# Patient Record
Sex: FEMALE | Race: WHITE | NOT HISPANIC OR LATINO | Employment: FULL TIME | URBAN - METROPOLITAN AREA
[De-identification: names, ages, dates, MRNs, and addresses within clinical notes are randomized per-mention and may not be internally consistent; named-entity substitution may affect disease eponyms.]

---

## 2017-03-02 NOTE — PROGRESS NOTES
SUBJECTIVE:                                                    Hansa Ta is a 26 year old female who presents to clinic today for the following health issues:      Growth on nose      Duration: one year    Description (location/character/radiation): on side of nose    Intensity:  mild    Accompanying signs and symptoms: bleeds when she picks at it. 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 Discussed follow up with dermatology. She understood and agreed.    She declines preventative care today as she is currently without insurance while she is in between jobs.     Problem list and histories reviewed & adjusted, as indicated.  Additional history: as documented    Patient Active Problem List   Diagnosis     Endometriosis     Ovarian cyst     CARDIOVASCULAR SCREENING; LDL GOAL LESS THAN 160     Post partum depression     Past Surgical History   Procedure Laterality Date     Appendectomy       Other surgical history       left ovary removed, for cysts     Laparoscopy diagnostic (gyn)       4     C/section, low transverse         Social History   Substance Use Topics     Smoking status: Never Smoker     Smokeless tobacco: Never Used     Alcohol use Yes      Comment: rare     History reviewed. No pertinent family history.      No current outpatient prescriptions on file.     No Known Allergies  BP Readings from Last 3 Encounters:   03/06/17 99/62   10/23/15 97/65   09/21/15 100/66    Wt Readings from Last 3 Encounters:   03/06/17 178 lb (80.7 kg)   10/23/15 159 lb (72.1 kg)   09/21/15 147 lb 12.8 oz (67 kg)                    Reviewed and updated as needed this visit by clinical staff  Tobacco  Allergies  Meds  Med Hx  Surg Hx  Fam Hx  Soc Hx      Reviewed and updated as needed this visit by  "Provider         ROS:  Constitutional, and integumentary systems are negative, except as otherwise noted.    OBJECTIVE:                                                    BP 99/62  Pulse 85  Temp 98.2  F (36.8  C) (Oral)  Ht 5' 4.25\" (1.632 m)  Wt 178 lb (80.7 kg)  SpO2 97%  BMI 30.32 kg/m2  Body mass index is 30.32 kg/(m^2).  GENERAL: healthy, alert and no distress  SKIN: <1cm smooth papule on the right nare, no swelling, no tenderness to palpation, no erythema, no signs of infection          ASSESSMENT/PLAN:                                                        ICD-10-CM    1. Skin lesion L98.9 DERMATOLOGY REFERRAL       Patient Instructions   Inquire about payment options at the dermatologist.    Inquire about the cost of a simple biopsy from the nose less than 1 cm in diameter.    Return to the clinic once you have insurance for an annual physical.      Melissa Cali PA-C  [facility]  "

## 2017-03-06 ENCOUNTER — OFFICE VISIT (OUTPATIENT)
Dept: FAMILY MEDICINE | Facility: CLINIC | Age: 27
End: 2017-03-06

## 2017-03-06 VITALS
SYSTOLIC BLOOD PRESSURE: 99 MMHG | WEIGHT: 178 LBS | TEMPERATURE: 98.2 F | HEART RATE: 85 BPM | OXYGEN SATURATION: 97 % | DIASTOLIC BLOOD PRESSURE: 62 MMHG | BODY MASS INDEX: 30.39 KG/M2 | HEIGHT: 64 IN

## 2017-03-06 DIAGNOSIS — L98.9 SKIN LESION: Primary | ICD-10-CM

## 2017-03-06 PROCEDURE — 99213 OFFICE O/P EST LOW 20 MIN: CPT | Performed by: PHYSICIAN ASSISTANT

## 2017-03-06 NOTE — MR AVS SNAPSHOT
After Visit Summary   3/6/2017    Hansa Ta    MRN: [id number]           Patient Information     Date Of Birth          1990        Visit Information        Provider Department      3/6/2017 10:00 AM Melissa Cali PA-C [facility]        Today's Diagnoses     Skin lesion    -  1      Care Instructions    Inquire about payment options at the dermatologist.    Inquire about the cost of a simple biopsy from the nose less than 1 cm in diameter.    Return to the clinic once you have insurance for an annual physical.        Follow-ups after your visit        Additional Services     DERMATOLOGY REFERRAL       Your provider has referred you to: FMG: [facility] [place] [phone]   [email or web address]/[place]/  UMP: [facility] [phone]   http://www.[facility]/[email or web address]-btyezdx-[place]/  [facility] [phone]   http://www.Support Your App/  [place] (2 locations) [phone]   http://www.[employer]/  [place] [phone]   [email or web address]://[email or web address].[employer] App/    Please be aware that coverage of these services is subject to the terms and limitations of your health insurance plan.  Call member services at your health plan with any benefit or coverage questions.      Please bring the following with you to your appointment:    (1) Any X-Rays, CTs or MRIs which have been performed.  Contact the facility where they were done to arrange for  prior to your scheduled appointment.    (2) List of current medications  (3) This referral request   (4) Any documents/labs given to you for this referral                  Who to contact     If you have questions or need follow up information about today's clinic visit or your schedule please contact [facility] directly at [phone].  Normal or non-critical lab and  "imaging results will be communicated to you by MyChart, letter or phone within 4 business days after the clinic has received the results. If you do not hear from us within 7 days, please contact the clinic through [employer] or phone. If you have a critical or abnormal lab result, we will notify you by phone as soon as possible.  Submit refill requests through [employer] or call your pharmacy and they will forward the refill request to us. Please allow 3 business days for your refill to be completed.          Additional Information About Your Visit        Flowboard[employer]Skyword Information     VE[employer]IMS lets you send messages to your doctor, view your test results, renew your prescriptions, schedule appointments and more. To sign up, go to [email or web address] . Click on \"Log in\" on the left side of the screen, which will take you to the Welcome page. Then click on \"Sign up Now\" on the right side of the page.     You will be asked to enter the access code listed below, as well as some personal information. Please follow the directions to create your username and password.     Your access code is: [email or web address]  Expires: 2017 10:29 AM     Your access code will  in 90 days. If you need help or a new code, please call your [place] clinic or [phone].        Care EveryWhere ID     This is your Care EveryWhere ID. This could be used by other organizations to access your [place] medical records  [id number]        Your Vitals Were     Pulse Temperature Height Pulse Oximetry BMI (Body Mass Index)       85 98.2  F (36.8  C) (Oral) 5' 4.25\" (1.632 m) 97% 30.32 kg/m2        Blood Pressure from Last 3 Encounters:   17 99/62   10/23/15 97/65   09/21/15 100/66    Weight from Last 3 Encounters:   17 178 lb (80.7 kg)   10/23/15 159 lb (72.1 kg)   09/21/15 147 lb 12.8 oz (67 kg)              We Performed the Following     DERMATOLOGY REFERRAL          Today's Medication Changes          These changes are accurate as of: " 3/6/17 10:29 AM.  If you have any questions, ask your nurse or doctor.               Stop taking these medicines if you haven't already. Please contact your care team if you have questions.     albuterol 108 (90 BASE) MCG/ACT Inhaler   Commonly known as:  PROAIR HFA/PROVENTIL HFA/VENTOLIN HFA   Stopped by:  Melissa Cali PA-C           ibuprofen 600 MG tablet   Commonly known as:  ADVIL/MOTRIN   Stopped by:  Melissa Cali PA-C           sertraline 50 MG tablet   Commonly known as:  ZOLOFT   Stopped by:  Melissa Cali PA-C                    Primary Care Provider Office Phone # Fax #    Ignacia LAYA Ramos [facility] [phone]       [facility] [address] [facility] [address]        Thank you!     Thank you for choosing [facility]  for your care. Our goal is always to provide you with excellent care. Hearing back from our patients is one way we can continue to improve our services. Please take a few minutes to complete the written survey that you may receive in the mail after your visit with us. Thank you!             Your Updated Medication List - Protect others around you: Learn how to safely use, store and throw away your medicines at www.disposemymeds.org.      Notice  As of 3/6/2017 10:29 AM    You have not been prescribed any medications.

## 2017-03-06 NOTE — PATIENT INSTRUCTIONS
Inquire about payment options at the dermatologist.    Inquire about the cost of a simple biopsy from the nose less than 1 cm in diameter.    Return to the clinic once you have insurance for an annual physical.

## 2017-03-06 NOTE — NURSING NOTE
"Chief Complaint   Patient presents with     Derm Problem     on side of nose - x 1 year -        Initial BP 99/62  Pulse 85  Temp 98.2  F (36.8  C) (Oral)  Ht 5' 4.25\" (1.632 m)  Wt 178 lb (80.7 kg)  SpO2 97%  BMI 30.32 kg/m2 Estimated body mass index is 30.32 kg/(m^2) as calculated from the following:    Height as of this encounter: 5' 4.25\" (1.632 m).    Weight as of this encounter: 178 lb (80.7 kg).  Medication Reconciliation: complete  Renee Youssef M.A.    "

## 2017-10-23 ENCOUNTER — OFFICE VISIT (OUTPATIENT)
Dept: INTERNAL MEDICINE | Facility: CLINIC | Age: 27
End: 2017-10-23
Payer: COMMERCIAL

## 2017-10-23 VITALS
SYSTOLIC BLOOD PRESSURE: 96 MMHG | DIASTOLIC BLOOD PRESSURE: 66 MMHG | OXYGEN SATURATION: 96 % | HEART RATE: 62 BPM | BODY MASS INDEX: 29.91 KG/M2 | WEIGHT: 175.6 LBS | TEMPERATURE: 97.8 F

## 2017-10-23 DIAGNOSIS — Z32.01 PREGNANCY TEST POSITIVE: ICD-10-CM

## 2017-10-23 DIAGNOSIS — N91.2 ABSENCE OF MENSTRUATION: Primary | ICD-10-CM

## 2017-10-23 LAB — BETA HCG QUAL IFA URINE: POSITIVE

## 2017-10-23 PROCEDURE — 99213 OFFICE O/P EST LOW 20 MIN: CPT | Performed by: INTERNAL MEDICINE

## 2017-10-23 PROCEDURE — 84703 CHORIONIC GONADOTROPIN ASSAY: CPT | Performed by: INTERNAL MEDICINE

## 2017-10-23 RX ORDER — ONDANSETRON 4 MG/1
4 TABLET, FILM COATED ORAL EVERY 6 HOURS PRN
Qty: 40 TABLET | Refills: 2 | Status: SHIPPED | OUTPATIENT
Start: 2017-10-23 | End: 2018-04-19

## 2017-10-23 NOTE — MR AVS SNAPSHOT
After Visit Summary   10/23/2017    Hansa Ta    MRN: [id number]           Patient Information     Date Of Birth          1990        Visit Information        Provider Department      10/23/2017 11:30 AM Santa Sorenson MD [facility]        Today's Diagnoses     Absence of menstruation    -  1    Pregnancy test positive          Care Instructions    Please call [phone] to schedule an appointment with Gynecologist/Obstetrician soon, ie, call within a week.  See basic recommendations below:        Pregnancy    Your exam today shows that you are pregnant.  Pregnancy symptoms  During pregnancy your body s hormones change. This causes physical and emotional changes. This is normal. Knowing what to expect is important for your piece of mind and so you know when to seek help for a problem. Here are some of the most common symptoms:    Morning sickness or nausea. This can happen any time of the day or night.    Tender, swollen breasts    Need to urinate frequently    Tiredness or fatigue    Dizziness    Indigestion or heartburn    Food cravings or turn-offs    Constipation    Emotional changes. This can range from anxiety to excitement to depression.  General care for a healthy pregnancy  Here are things you can do to help make sure your baby is born healthy:    Rest when you feel tired. This is especially true in the later months of pregnancy.    Drink more fluids. Your body needs more fluids than you may be used to. Drink 8 to10 glasses of juice, milk, or water every day.    Eat well-balanced meals. Eat at regular times to give your body enough protein. You can expect to gain about 30 pounds during the pregnancy. Don t try to diet or lose weight while you are pregnant.    Take a prenatal vitamin every day. This helps you meet the extra nutritional needs of pregnancy.    Don t take any other medicine during your pregnancy unless your healthcare provider tells you to.  This includes prescription medicines and those you buy over the counter. Many medicines can harm the growing baby.    If you have nausea or vomiting, don t eat greasy or fried foods. Eat several smaller meals throughout the day rather than 3 large meals.    If you smoke, you must stop. The nicotine you breathe in goes right to the baby.    Stay away from alcohol, even in moderate amounts. Daily drinking will harm your baby and can cause permanent brain damage.    Don t use recreational drugs, especially cocaine, crack, and heroin. These will harm your baby. Also avoid marijuana.    If you were using recreational drugs or prescribed medicine when you found out that you were pregnant, talk with your healthcare provider about possible effects on your growing baby.    If you have medical problems that you need to take medicine for, talk with your healthcare provider.  Follow-up care  Call your healthcare provider to arrange for prenatal care. Prenatal care is important. You can see your family provider, a pregnancy specialist (obstetrician), or a primary care clinic.  When to seek medical advice  Call your healthcare provider right away if any of these occur:    Vaginal bleeding    Pain in your belly (abdomen) or back that is moderate or severe    Lots of vomiting, or you can t keep any fluids down for 6 hours    Burning feeling when you urinate    Headache, dizziness, or rapid weight gain    Fever    Vision changes or blurred vision  Date Last Reviewed: 10/1/2016    [id number] [employer]. [address] [place] [address], [place] [address]. All rights reserved. This information is not intended as a substitute for professional medical care. Always follow your healthcare professional's instructions.                Follow-ups after your visit        Additional Services     OB/GYN REFERRAL       Your provider has referred you to:  FMG: [facility] [phone]    "[email or web address]/[place]/  FMG: [place] [place] [phone]   [email or web address]/[place]/  FMG: [place] [facility]gu[facility] [phone]   http://[email or web address]/[place]/  FMG: [facility] [place] [phone]   http://www.[email or web address].[facility]/     Please be aware that coverage of these services is subject to the terms and limitations of your health insurance plan.  Call member services at your health plan with any benefit or coverage questions.      Please bring the following with you to your appointment:    (1) Any X-Rays, CTs or MRIs which have been performed.  Contact the facility where they were done to arrange for  prior to your scheduled appointment.   (2) List of current medications   (3) This referral request   (4) Any documents/labs given to you for this referral                  Who to contact     If you have questions or need follow up information about today's clinic visit or your schedule please contact [facility] directly at [phone].  Normal or non-critical lab and imaging results will be communicated to you by MyChart, letter or phone within 4 business days after the clinic has received the results. If you do not hear from us within 7 days, please contact the clinic through MyChart or phone. If you have a critical or abnormal lab result, we will notify you by phone as soon as possible.  Submit refill requests through [employer] or call your pharmacy and they will forward the refill request to us. Please allow 3 business days for your refill to be completed.          Additional Information About Your Visit        Go[employer] Software Information     [employer] lets you send messages to your doctor, view your test results, renew your prescriptions, schedule appointments and more. To sign up, go to [email or web address] . Click on \"Log in\" on the left side of the " "screen, which will take you to the Welcome page. Then click on \"Sign up Now\" on the right side of the page.     You will be asked to enter the access code listed below, as well as some personal information. Please follow the directions to create your username and password.     Your access code is: [email or web address]  Expires: 2018 12:00 PM     Your access code will  in 90 days. If you need help or a new code, please call your [facility] or [phone].        Care EveryWhere ID     This is your Care EveryWhere ID. This could be used by other organizations to access your [place] medical records  [id number]        Your Vitals Were     Pulse Temperature Last Period Pulse Oximetry BMI (Body Mass Index)       62 97.8  F (36.6  C) (Oral) 2017 (Approximate) 96% 29.91 kg/m2        Blood Pressure from Last 3 Encounters:   10/23/17 96/66   17 99/62   10/23/15 97/65    Weight from Last 3 Encounters:   10/23/17 175 lb 9.6 oz (79.7 kg)   17 178 lb (80.7 kg)   10/23/15 159 lb (72.1 kg)              We Performed the Following     Beta HCG qual IFA urine - [facility] and [place]     OB/GYN REFERRAL          Today's Medication Changes          These changes are accurate as of: 10/23/17 12:00 PM.  If you have any questions, ask your nurse or doctor.               Start taking these medicines.        Dose/Directions    ondansetron 4 MG tablet   Commonly known as:  ZOFRAN   Used for:  Pregnancy test positive   Started by:  Santa Sorenson MD        Dose:  4 mg   Take 1 tablet (4 mg) by mouth every 6 hours as needed for nausea   Quantity:  40 tablet   Refills:  2            Where to get your medicines      These medications were sent to [facility] [address] [facility], [address]  [address] [facility] [address] [address]     Phone:  [phone]     ondansetron 4 MG tablet                Primary Care Provider Office Phone # Fax #    [facility] " [phone]       [address] [facility] [address]        Equal Access to Services     CATARINO ECHEVARRIA : Hadii aad ku haddonnaemiliano Sudarshan, waterrida lukey, quincyta kaloveda tala, rosa truong katherynki benavidez laLaylaмария jett. So [facility] [phone].    ATENCIÓN: Si habla español, tiene a espinosa disposición servicios gratuitos de asistencia lingüística. Llame al [phone].    We comply with applicable federal civil rights laws and [place] laws. We do not discriminate on the basis of race, color, national origin, age, disability, sex, sexual orientation, or gender identity.            Thank you!     Thank you for choosing [facility]  for your care. Our goal is always to provide you with excellent care. Hearing back from our patients is one way we can continue to improve our services. Please take a few minutes to complete the written survey that you may receive in the mail after your visit with us. Thank you!             Your Updated Medication List - Protect others around you: Learn how to safely use, store and throw away your medicines at www.disposemymeds.org.          This list is accurate as of: 10/23/17 12:00 PM.  Always use your most recent med list.                   Brand Name Dispense Instructions for use Diagnosis    ondansetron 4 MG tablet    ZOFRAN    40 tablet    Take 1 tablet (4 mg) by mouth every 6 hours as needed for nausea    Pregnancy test positive

## 2017-10-23 NOTE — PROGRESS NOTES
SUBJECTIVE:   Hansa Ta is a 27 year old female who presents to clinic today for the following health issues:      Patient is here to confirm pregnancy. LMP is approximately 9/8/2017        Reviewed and updated as needed this visit by clinical staff  Tobacco  Allergies  Meds  Med Hx  Surg Hx  Fam Hx  Soc Hx      Reviewed and updated as needed this visit by Provider         Patient Active Problem List   Diagnosis     Endometriosis     Ovarian cyst     CARDIOVASCULAR SCREENING; LDL GOAL LESS THAN 160     Post partum depression       Past Medical History:   Diagnosis Date     Endometriosis      Ovarian cyst        Past Surgical History:   Procedure Laterality Date     APPENDECTOMY       C/SECTION, LOW TRANSVERSE       LAPAROSCOPY DIAGNOSTIC (GYN)      4     OTHER SURGICAL HISTORY      left ovary removed, for cysts       History reviewed. No pertinent family history.    Social History   Substance Use Topics     Smoking status: Never Smoker     Smokeless tobacco: Never Used     Alcohol use Yes      Comment: rare       Current Outpatient Prescriptions   Medication     ondansetron (ZOFRAN) 4 MG tablet     No current facility-administered medications for this visit.          ROS:  Constitutional, HEENT, cardiovascular, pulmonary, GI, , musculoskeletal, neuro, skin, endocrine and psych systems are negative, except as otherwise noted.     OBJECTIVE:                                                    BP 96/66  Pulse 62  Temp 97.8  F (36.6  C) (Oral)  Wt 175 lb 9.6 oz (79.7 kg)  LMP 09/08/2017 (Approximate)  SpO2 96%  BMI 29.91 kg/m2     GENERAL APPEARANCE: healthy, alert and in no distress  PSYCH: mentation appears normal and affect normal/bright.    Results for orders placed or performed in visit on 10/23/17   Beta HCG qual IFA urine - [facility] and [place]   Result Value Ref Range    Beta HCG Qual IFA Urine Positive (A) NEG^Negative          No results found for this or any previous visit (from the past 744  hour(s)).      ASSESSMENT/PLAN:                                                        ICD-10-CM    1. Absence of menstruation N91.2 Beta HCG qual IFA urine - [facility] and [place]   2. Pregnancy test positive Z32.01 OB/GYN REFERRAL     ondansetron (ZOFRAN) 4 MG tablet       Patient Instructions   Please call [phone] to schedule an appointment with Gynecologist/Obstetrician soon, ie, call within a week.  See basic recommendations below:        Pregnancy    Your exam today shows that you are pregnant.  Pregnancy symptoms  During pregnancy your body s hormones change. This causes physical and emotional changes. This is normal. Knowing what to expect is important for your piece of mind and so you know when to seek help for a problem. Here are some of the most common symptoms:    Morning sickness or nausea. This can happen any time of the day or night.    Tender, swollen breasts    Need to urinate frequently    Tiredness or fatigue    Dizziness    Indigestion or heartburn    Food cravings or turn-offs    Constipation    Emotional changes. This can range from anxiety to excitement to depression.  General care for a healthy pregnancy  Here are things you can do to help make sure your baby is born healthy:    Rest when you feel tired. This is especially true in the later months of pregnancy.    Drink more fluids. Your body needs more fluids than you may be used to. Drink 8 to10 glasses of juice, milk, or water every day.    Eat well-balanced meals. Eat at regular times to give your body enough protein. You can expect to gain about 30 pounds during the pregnancy. Don t try to diet or lose weight while you are pregnant.    Take a prenatal vitamin every day. This helps you meet the extra nutritional needs of pregnancy.    Don t take any other medicine during your pregnancy unless your healthcare provider tells you to. This includes prescription medicines and those you buy over the counter. Many medicines can harm the  growing baby.    If you have nausea or vomiting, don t eat greasy or fried foods. Eat several smaller meals throughout the day rather than 3 large meals.    If you smoke, you must stop. The nicotine you breathe in goes right to the baby.    Stay away from alcohol, even in moderate amounts. Daily drinking will harm your baby and can cause permanent brain damage.    Don t use recreational drugs, especially cocaine, crack, and heroin. These will harm your baby. Also avoid marijuana.    If you were using recreational drugs or prescribed medicine when you found out that you were pregnant, talk with your healthcare provider about possible effects on your growing baby.    If you have medical problems that you need to take medicine for, talk with your healthcare provider.  Follow-up care  Call your healthcare provider to arrange for prenatal care. Prenatal care is important. You can see your family provider, a pregnancy specialist (obstetrician), or a primary care clinic.  When to seek medical advice  Call your healthcare provider right away if any of these occur:    Vaginal bleeding    Pain in your belly (abdomen) or back that is moderate or severe    Lots of vomiting, or you can t keep any fluids down for 6 hours    Burning feeling when you urinate    Headache, dizziness, or rapid weight gain    Fever    Vision changes or blurred vision  Date Last Reviewed: 10/1/2016    5629-4496 [employer]. [address] [place] [address], [place] [address]. All rights reserved. This information is not intended as a substitute for professional medical care. Always follow your healthcare professional's instructions.            Santa Sorenson MD    [address] [phone]

## 2017-10-23 NOTE — NURSING NOTE
"Chief Complaint   Patient presents with     Confirmation Of Pregnancy       Initial BP 96/66  Pulse 62  Temp 97.8  F (36.6  C) (Oral)  Wt 175 lb 9.6 oz (79.7 kg)  LMP 09/08/2017 (Approximate)  SpO2 96%  BMI 29.91 kg/m2 Estimated body mass index is 29.91 kg/(m^2) as calculated from the following:    Height as of 3/6/17: 5' 4.25\" (1.632 m).    Weight as of this encounter: 175 lb 9.6 oz (79.7 kg).  Medication Reconciliation: complete    Mara Garcia CMA  "

## 2017-10-23 NOTE — PATIENT INSTRUCTIONS
Please call [phone] to schedule an appointment with Gynecologist/Obstetrician soon, ie, call within a week.  See basic recommendations below:        Pregnancy    Your exam today shows that you are pregnant.  Pregnancy symptoms  During pregnancy your body s hormones change. This causes physical and emotional changes. This is normal. Knowing what to expect is important for your piece of mind and so you know when to seek help for a problem. Here are some of the most common symptoms:    Morning sickness or nausea. This can happen any time of the day or night.    Tender, swollen breasts    Need to urinate frequently    Tiredness or fatigue    Dizziness    Indigestion or heartburn    Food cravings or turn-offs    Constipation    Emotional changes. This can range from anxiety to excitement to depression.  General care for a healthy pregnancy  Here are things you can do to help make sure your baby is born healthy:    Rest when you feel tired. This is especially true in the later months of pregnancy.    Drink more fluids. Your body needs more fluids than you may be used to. Drink 8 to10 glasses of juice, milk, or water every day.    Eat well-balanced meals. Eat at regular times to give your body enough protein. You can expect to gain about 30 pounds during the pregnancy. Don t try to diet or lose weight while you are pregnant.    Take a prenatal vitamin every day. This helps you meet the extra nutritional needs of pregnancy.    Don t take any other medicine during your pregnancy unless your healthcare provider tells you to. This includes prescription medicines and those you buy over the counter. Many medicines can harm the growing baby.    If you have nausea or vomiting, don t eat greasy or fried foods. Eat several smaller meals throughout the day rather than 3 large meals.    If you smoke, you must stop. The nicotine you breathe in goes right to the baby.    Stay away from alcohol, even in moderate amounts. Daily  drinking will harm your baby and can cause permanent brain damage.    Don t use recreational drugs, especially cocaine, crack, and heroin. These will harm your baby. Also avoid marijuana.    If you were using recreational drugs or prescribed medicine when you found out that you were pregnant, talk with your healthcare provider about possible effects on your growing baby.    If you have medical problems that you need to take medicine for, talk with your healthcare provider.  Follow-up care  Call your healthcare provider to arrange for prenatal care. Prenatal care is important. You can see your family provider, a pregnancy specialist (obstetrician), or a primary care clinic.  When to seek medical advice  Call your healthcare provider right away if any of these occur:    Vaginal bleeding    Pain in your belly (abdomen) or back that is moderate or severe    Lots of vomiting, or you can t keep any fluids down for 6 hours    Burning feeling when you urinate    Headache, dizziness, or rapid weight gain    Fever    Vision changes or blurred vision  Date Last Reviewed: 10/1/2016    9305-8152 [employer]. [address] [place] [address], [place] [address]. All rights reserved. This information is not intended as a substitute for professional medical care. Always follow your healthcare professional's instructions.

## 2017-10-27 ENCOUNTER — PRENATAL OFFICE VISIT (OUTPATIENT)
Dept: OBGYN | Facility: CLINIC | Age: 27
End: 2017-10-27
Payer: COMMERCIAL

## 2017-10-27 DIAGNOSIS — O34.219 PREVIOUS CESAREAN DELIVERY AFFECTING PREGNANCY: Primary | ICD-10-CM

## 2017-10-27 DIAGNOSIS — N89.8 VAGINAL IRRITATION: ICD-10-CM

## 2017-10-27 DIAGNOSIS — N76.0 BV (BACTERIAL VAGINOSIS): ICD-10-CM

## 2017-10-27 DIAGNOSIS — B96.89 BV (BACTERIAL VAGINOSIS): ICD-10-CM

## 2017-10-27 LAB
BASOPHILS # BLD AUTO: 0 10E9/L (ref 0–0.2)
BASOPHILS NFR BLD AUTO: 0.3 %
DIFFERENTIAL METHOD BLD: NORMAL
EOSINOPHIL # BLD AUTO: 0.1 10E9/L (ref 0–0.7)
EOSINOPHIL NFR BLD AUTO: 0.8 %
ERYTHROCYTE [DISTWIDTH] IN BLOOD BY AUTOMATED COUNT: 12.7 % (ref 10–15)
HCT VFR BLD AUTO: 40 % (ref 35–47)
HGB BLD-MCNC: 13.7 G/DL (ref 11.7–15.7)
LYMPHOCYTES # BLD AUTO: 1.5 10E9/L (ref 0.8–5.3)
LYMPHOCYTES NFR BLD AUTO: 22.6 %
MCH RBC QN AUTO: 30.4 PG (ref 26.5–33)
MCHC RBC AUTO-ENTMCNC: 34.3 G/DL (ref 31.5–36.5)
MCV RBC AUTO: 89 FL (ref 78–100)
MONOCYTES # BLD AUTO: 0.5 10E9/L (ref 0–1.3)
MONOCYTES NFR BLD AUTO: 7.1 %
NEUTROPHILS # BLD AUTO: 4.6 10E9/L (ref 1.6–8.3)
NEUTROPHILS NFR BLD AUTO: 69.2 %
PLATELET # BLD AUTO: 184 10E9/L (ref 150–450)
RBC # BLD AUTO: 4.5 10E12/L (ref 3.8–5.2)
SPECIMEN SOURCE: ABNORMAL
WBC # BLD AUTO: 6.6 10E9/L (ref 4–11)
WET PREP SPEC: ABNORMAL

## 2017-10-27 PROCEDURE — 86900 BLOOD TYPING SEROLOGIC ABO: CPT | Performed by: OBSTETRICS & GYNECOLOGY

## 2017-10-27 PROCEDURE — 99207 ZZC FIRST OB VISIT: CPT | Performed by: OBSTETRICS & GYNECOLOGY

## 2017-10-27 PROCEDURE — 86901 BLOOD TYPING SEROLOGIC RH(D): CPT | Performed by: OBSTETRICS & GYNECOLOGY

## 2017-10-27 PROCEDURE — 36415 COLL VENOUS BLD VENIPUNCTURE: CPT | Performed by: OBSTETRICS & GYNECOLOGY

## 2017-10-27 PROCEDURE — 85025 COMPLETE CBC W/AUTO DIFF WBC: CPT | Performed by: OBSTETRICS & GYNECOLOGY

## 2017-10-27 PROCEDURE — 87210 SMEAR WET MOUNT SALINE/INK: CPT | Performed by: OBSTETRICS & GYNECOLOGY

## 2017-10-27 PROCEDURE — 87340 HEPATITIS B SURFACE AG IA: CPT | Performed by: OBSTETRICS & GYNECOLOGY

## 2017-10-27 PROCEDURE — 86850 RBC ANTIBODY SCREEN: CPT | Performed by: OBSTETRICS & GYNECOLOGY

## 2017-10-27 PROCEDURE — 87389 HIV-1 AG W/HIV-1&-2 AB AG IA: CPT | Performed by: OBSTETRICS & GYNECOLOGY

## 2017-10-27 PROCEDURE — 86762 RUBELLA ANTIBODY: CPT | Performed by: OBSTETRICS & GYNECOLOGY

## 2017-10-27 PROCEDURE — 86780 TREPONEMA PALLIDUM: CPT | Performed by: OBSTETRICS & GYNECOLOGY

## 2017-10-27 PROCEDURE — 87086 URINE CULTURE/COLONY COUNT: CPT | Performed by: OBSTETRICS & GYNECOLOGY

## 2017-10-27 RX ORDER — PRENATAL VIT/IRON FUM/FOLIC AC 27MG-0.8MG
1 TABLET ORAL DAILY
COMMUNITY
End: 2019-08-06

## 2017-10-27 RX ORDER — METRONIDAZOLE 7.5 MG/G
1 GEL VAGINAL AT BEDTIME
Qty: 70 G | Refills: 0 | Status: SHIPPED | OUTPATIENT
Start: 2017-10-27 | End: 2017-11-01

## 2017-10-27 NOTE — PROGRESS NOTES
INITIAL OB ASSESSMENT............................................Date: 10/27/2017                            ---------------------    Name: Hansa Ta.......................................................................Plan Number: [id number]    Age: 27 year old   : 1990  Phone: [phone] (home)   Address: [address] [place] [id number]    Marital Status:    Race/Ethnicity:   Occupation:   at [employer] in [place]  Partner's Name:  Tylor Ta, Partner's Occupation:   for [employer]      LMP:  Patient's LMP from OB Dating Form:  Patient's last menstrual period was 2017 (approximate).  Regular menses? yes  Menses every month.   Length of menses: 2-3 days    Obstetrical History  ===================  Obstetric History       T1      L1     SAB0   TAB0   Ectopic0   Multiple0   Live Births1       # Outcome Date GA Lbr Humberto/2nd Weight Sex Delivery Anes PTL Lv   1 Term 08/14/15 41w0d  9 lb 8 oz (4.309 kg) F CS-LTranv Spinal N TAYA      Complications: Fetal Intolerance      Apgar1:  2                Apgar5: 9          Past Medical History:  Past Medical History:   Diagnosis Date     Endometriosis      Ovarian cyst        Past Surgical History:  Past Surgical History:   Procedure Laterality Date     APPENDECTOMY       C/SECTION, LOW TRANSVERSE       LAPAROSCOPY DIAGNOSTIC (GYN)      4     OTHER SURGICAL HISTORY      left ovary removed, for cysts       Current Outpatient Prescriptions   Medication Sig Dispense Refill     Prenatal Vit-Fe Fumarate-FA (PRENATAL MULTIVITAMIN PLUS IRON) 27-0.8 MG TABS per tablet Take 1 tablet by mouth daily       ondansetron (ZOFRAN) 4 MG tablet Take 1 tablet (4 mg) by mouth every 6 hours as needed for nausea 40 tablet 2       No Known Allergies    Social History     Social History     Marital status:      Spouse name: Tylor     Number of children: 1     Years of education: 16      Occupational History     Not on file.     Social History Main Topics     Smoking status: Never Smoker     Smokeless tobacco: Never Used     Alcohol use Yes      Comment: rare     Drug use: No     Sexual activity: Yes     Partners: Male     Other Topics Concern     Not on file     Social History Narrative     , Children  No substance abuse, environmental exposures, mental health risks and No financial concerns.  They have pets, 2 dogs. Partner support.       Family History   Problem Relation Age of Onset     Spine Problems Mother      Lung Cancer Father        Past Medical History of Father of Baby:   No significant medical history       No known genetic disease in patient's 1st or 2nd degree relatives  No known genetic diseases in the FOB's 1st or 2nd degree relatives      REVIEW OF SYMPTOMS:   History Since Last Menstrual Period:    nausea, vomiting, fatigue and breast tenderness       PHYSICAL EXAM:  General:  WNWD female, NAD  Oriented:  X 3  Alert  PSYCH:  mentation appears normal., affect and mood normal  HEENT:  NC/AT, EOMI  NECK:  Neck supple. No adenopathy. Thyroid symmetric, normal size,, Carotids without bruits.  HEART:  RRR  LUNGS:  Clear to auscultation.  Good piratory effort  BREASTS: symmetrical, no masses palpated, no discharge expressed  ABDOMEN: Benign, Soft, flat, non-tender, No masses, organomegaly and Bowel sounds normoactive.  VULVA: no masses or lesions seen  BUS:  Bartholin's, Urethra, Tornado's normal  VAGINA:  No masses or lesions seen.   CERVIX:  Nulliparous, closed, mobile,  no discharge  UTERUS:  Normal shape, position and consistency and Nontender; 6-8 week size  ADNEXA:  No masses palpated, non-tender  EXTREMITIES:No cyanosis, clubbing, warm and well perfused and No edema   GAIT: normal including tandem walk, heel and toe walk.        Assessment:   IUP at 7 weeks, but reported uncertain of LMP     Plan:  Options for  testing for chromosomal and birth defects were  discussed with the patient.  Diagnostic tests include CVS and Amniocentesis.  We discussed that these tests are definitive but invasive and do carry a risk of fetal loss.    Screening tests include nuchal translucency/blood marker testing in the first trimester and quad screening in the second trimester.  We discussed that these are screening tests and not diagnostic tests and that false positives and negatives are a distinct possibility.  The patient will discuss with her  and decide.  We discussed physician coverage, tertiary support, diet, exercise, weight gain, schedule of visits, routine and indicated ultrasounds, and childbirth education.   Labs done today  RTC 4 weeks    Romaine Cardenas MD

## 2017-10-27 NOTE — NURSING NOTE
"Chief Complaint   Patient presents with     Prenatal Care     First OB       Initial LMP 09/08/2017 (Approximate) Estimated body mass index is 29.91 kg/(m^2) as calculated from the following:    Height as of 3/6/17: 5' 4.25\" (1.632 m).    Weight as of 10/23/17: 175 lb 9.6 oz (79.7 kg).  Medication Reconciliation: complete   LOURDES Alston 10/27/2017         "

## 2017-10-28 LAB — T PALLIDUM IGG+IGM SER QL: NEGATIVE

## 2017-10-29 LAB
BACTERIA SPEC CULT: NORMAL
RUBV IGG SERPL IA-ACNC: 16 IU/ML
SPECIMEN SOURCE: NORMAL

## 2017-10-30 LAB
ABO + RH BLD: NORMAL
ABO + RH BLD: NORMAL
BLD GP AB SCN SERPL QL: NORMAL
BLOOD BANK CMNT PATIENT-IMP: NORMAL
HBV SURFACE AG SERPL QL IA: NONREACTIVE
HIV 1+2 AB+HIV1 P24 AG SERPL QL IA: NONREACTIVE
SPECIMEN EXP DATE BLD: NORMAL

## 2017-11-06 ENCOUNTER — RADIANT APPOINTMENT (OUTPATIENT)
Dept: ULTRASOUND IMAGING | Facility: CLINIC | Age: 27
End: 2017-11-06
Attending: OBSTETRICS & GYNECOLOGY
Payer: COMMERCIAL

## 2017-11-06 DIAGNOSIS — O34.219 PREVIOUS CESAREAN DELIVERY AFFECTING PREGNANCY: ICD-10-CM

## 2017-11-06 PROCEDURE — 76801 OB US < 14 WKS SINGLE FETUS: CPT

## 2017-11-07 ENCOUNTER — TELEPHONE (OUTPATIENT)
Dept: OBGYN | Facility: CLINIC | Age: 27
End: 2017-11-07

## 2017-11-07 NOTE — TELEPHONE ENCOUNTER
Patient called back.  She did complete the Metrogel and is aware that the ultrasound is normal.  Kimberley Young RN

## 2017-11-07 NOTE — TELEPHONE ENCOUNTER
Romaine Cardenas MD  [facility] Patient Care                     Ok to notify patient of the ultrasound results:     IMPRESSION: Unremarkable appearance single live intrauterine pregnancy   at 10 weeks 0 days estimated sonographic gestational age.     Thanks   Romaine Cardenas MD      Estimated date of delivery based on this ultrasound: 6/4/2018.  Left a message for patient to call back at [phone] and ask for Kimberley in women's.  There are two open result notes.  This one, and one with her lab results.  Please give both.  Copied that information below.  Notes Recorded by Romaine Cardenas MD on 11/7/2017 at 10:31 AM  metrogel was sent to pharmacy and is ok to take before 12 weeks.   Romaine Cardenas MD    ------    Notes Recorded by Maria Elena Curiel LPN on 11/6/2017 at 4:46 PM  No notes recorded by provider  ------  Notes Recorded by Maria Elena Curiel LPN on 11/6/2017 at 11:55 AM  Pt stated that the prescription was called in to the pharmacy and she already took all the meds.  Maria Elena Curiel LPN  ------  Notes Recorded by Maria Elena Curiel LPN on 11/6/2017 at 11:45 AM  LM for pt to call  Maria Elena Curiel LPN  ------  Notes Recorded by Romaine Cardenas MD on 11/3/2017 at 5:29 PM  Ok to notify patient the prenatal lab results look good.   She has some clue cells on the wet prep. I would suggest to hold treatment until about 12 weeks.   The prescription may be written at her next visit.   Thanks  MD Kimberley Garcia RN

## 2017-11-27 ENCOUNTER — PRENATAL OFFICE VISIT (OUTPATIENT)
Dept: OBGYN | Facility: CLINIC | Age: 27
End: 2017-11-27
Payer: COMMERCIAL

## 2017-11-27 VITALS
HEART RATE: 98 BPM | WEIGHT: 172.2 LBS | OXYGEN SATURATION: 97 % | BODY MASS INDEX: 29.33 KG/M2 | SYSTOLIC BLOOD PRESSURE: 94 MMHG | DIASTOLIC BLOOD PRESSURE: 68 MMHG | TEMPERATURE: 97.5 F

## 2017-11-27 DIAGNOSIS — Z34.01 ENCOUNTER FOR SUPERVISION OF NORMAL FIRST PREGNANCY IN FIRST TRIMESTER: Primary | ICD-10-CM

## 2017-11-27 DIAGNOSIS — O34.219 PREVIOUS CESAREAN DELIVERY AFFECTING PREGNANCY: ICD-10-CM

## 2017-11-27 DIAGNOSIS — B96.89 BACTERIAL VAGINOSIS: ICD-10-CM

## 2017-11-27 DIAGNOSIS — N76.0 BACTERIAL VAGINOSIS: ICD-10-CM

## 2017-11-27 PROCEDURE — 99207 ZZC PRENATAL VISIT: CPT | Performed by: OBSTETRICS & GYNECOLOGY

## 2017-11-27 RX ORDER — METRONIDAZOLE 500 MG/1
500 TABLET ORAL 2 TIMES DAILY
Qty: 14 TABLET | Refills: 0 | Status: SHIPPED | OUTPATIENT
Start: 2017-11-27 | End: 2017-12-20

## 2017-11-27 RX ORDER — IBUPROFEN 600 MG/1
TABLET, FILM COATED ORAL
COMMUNITY
Start: 2015-08-16 | End: 2017-12-20

## 2017-11-27 NOTE — NURSING NOTE
"Chief Complaint   Patient presents with     Prenatal Care     11w3d       Initial BP 94/68  Pulse 98  Temp 97.5  F (36.4  C) (Oral)  Wt 172 lb 3.2 oz (78.1 kg)  LMP 09/08/2017 (Approximate)  SpO2 97%  BMI 29.33 kg/m2 Estimated body mass index is 29.33 kg/(m^2) as calculated from the following:    Height as of 3/6/17: 5' 4.25\" (1.632 m).    Weight as of this encounter: 172 lb 3.2 oz (78.1 kg).  Medication Reconciliation: complete   Christine Palomino CMA      "

## 2017-11-27 NOTE — PROGRESS NOTES
Patient presents for routine prenatal visit at 13w0d.  Patient with complaint. Having some sciatica and requests to limit work shifts to 6 hours  PE: See OB vitals  Body mass index is 29.33 kg/(m^2).    Questions asked and answered.    Flagyl for + BV  Reviewed risks and benefits of a trial of labor, including the risk of uterine rupture (1-2%), with risk of permanent fetal neurologic damage or fetal death.  Discussed the maternal risks including hemorrhage, injury to bladder and other structures.  Discussed potential outcomes: successful ,  section for distress/rupture, or for failure to progress and the relative likelihood of successful vaginal delivery.  Discussed the risks of  section including the risks of bleeding, infection and injury to bowel/bladder and other structures.  Discussed the recovery for both vaginal and  deliveries.  She is given the opportunity to ask questions and have them answered.  At this time she plans a Repeat .  She had issues with the spinal, wants to discuss general anesthesia.  Discussed pros and cons.  She will think about her options.    Pete Cantu MD FACOG

## 2017-11-27 NOTE — MR AVS SNAPSHOT
After Visit Summary   2017    Hansa Ta    MRN: [id number]           Patient Information     Date Of Birth          1990        Visit Information        Provider Department      2017 11:45 AM Pete Cantu MD [facility]        Today's Diagnoses     Encounter for supervision of normal first pregnancy in first trimester    -  1    Bacterial vaginosis        Previous  delivery affecting pregnancy          Care Instructions                                                         If you have any questions regarding your visit, Please contact your care team.    Women s Health CLINIC HOURS TELEPHONE NUMBER   MD Christine Heart CMA Lisa -    AMBIKA Maldonado RN       Monday:       7:30-4:30 [place]  Wednesday:       7:30-4:30 [place]  Thursday:       7:30-1:30 [place]  Friday:       7:30-11:30 [facility]  [address] [facility] [place]  [address]  [phone] ask for [facility]  [address] [place] [address]  [phone] ask for [facility]    Imaging Scheduling for [place]:  [phone]    Imaging Scheduling for [place]: [phone]       Urgent Care locations:    [facility] Saturday and    9 am - 5 pm    Monday-Friday   12 pm - 8 pm  Saturday and    9 am - 5 pm   [phone]     [facility] Labor and Delivery:  [phone]    If you need a medication refill, please contact your pharmacy. Please allow 3 business days for your refill to be completed.  As always, Thank you for trusting us with your healthcare needs!              Follow-ups after your visit        Follow-up notes from your care team     Return in about 4 weeks (around 2017) for Prenatal Visit.      Who to contact     If you have questions or need follow up information about today's clinic visit or your schedule  "please contact [facility] directly at [phone].  Normal or non-critical lab and imaging results will be communicated to you by MyChart, letter or phone within 4 business days after the clinic has received the results. If you do not hear from us within 7 days, please contact the clinic through MyChart or phone. If you have a critical or abnormal lab result, we will notify you by phone as soon as possible.  Submit refill requests through [employer] or call your pharmacy and they will forward the refill request to us. Please allow 3 business days for your refill to be completed.          Additional Information About Your Visit        Space Pencil[employer]BeHome247 Information     [employer] lets you send messages to your doctor, view your test results, renew your prescriptions, schedule appointments and more. To sign up, go to [email or web address] . Click on \"Log in\" on the left side of the screen, which will take you to the Welcome page. Then click on \"Sign up Now\" on the right side of the page.     You will be asked to enter the access code listed below, as well as some personal information. Please follow the directions to create your username and password.     Your access code is: [email or web address]  Expires: 2018 11:00 AM     Your access code will  in 90 days. If you need help or a new code, please call your [place] clinic or [phone].        Care EveryWhere ID     This is your Care EveryWhere ID. This could be used by other organizations to access your [place] medical records  [id number]        Your Vitals Were     Pulse Temperature Last Period Pulse Oximetry BMI (Body Mass Index)       98 97.5  F (36.4  C) (Oral) 2017 (Approximate) 97% 29.33 kg/m2        Blood Pressure from Last 3 Encounters:   17 94/68   10/23/17 96/66   17 99/62    Weight from Last 3 Encounters:   17 78.1 kg (172 lb 3.2 oz)   10/23/17 79.7 kg (175 lb 9.6 oz)   17 80.7 kg (178 lb)              Today, you had " the following     No orders found for display         Today's Medication Changes          These changes are accurate as of: 11/27/17 11:59 PM.  If you have any questions, ask your nurse or doctor.               Start taking these medicines.        Dose/Directions    metroNIDAZOLE 500 MG tablet   Commonly known as:  FLAGYL   Used for:  Bacterial vaginosis   Started by:  Pete Cantu MD        Dose:  500 mg   Take 1 tablet (500 mg) by mouth 2 times daily   Quantity:  14 tablet   Refills:  0            Where to get your medicines      These medications were sent to [facility] [place], [place] - [address]  [address] [address]     Phone:  [phone]     metroNIDAZOLE 500 MG tablet                Primary Care Provider Office Phone # Fax #    [facility] [phone] [address] [address]        Equal Access to Services     [facility] : Hadii jeet lugo hadasho Soomaali, waaxda luqadaha, qaybta kaalmada adeegyada, rosa souza hayмария durham . So [facility] [phone].    ATENCIÓN: Si habla español, tiene a espinosa disposición servicios gratuitos de asistencia lingüística. Llame al [phone].    We comply with applicable federal civil rights laws and [place] laws. We do not discriminate on the basis of race, color, national origin, age, disability, sex, sexual orientation, or gender identity.            Thank you!     Thank you for choosing [facility]  for your care. Our goal is always to provide you with excellent care. Hearing back from our patients is one way we can continue to improve our services. Please take a few minutes to complete the written survey that you may receive in the mail after your visit with us. Thank you!             Your Updated Medication List - Protect others around you: Learn how to safely use, store and throw away your medicines at www.disposemymeds.org.          This list is  accurate as of: 17 11:59 PM.  Always use your most recent med list.                   Brand Name Dispense Instructions for use Diagnosis    ibuprofen 600 MG tablet    ADVIL/MOTRIN     Take 1 Tab by mouth every 6 (six) hours as needed (pain).        metroNIDAZOLE 500 MG tablet    FLAGYL    14 tablet    Take 1 tablet (500 mg) by mouth 2 times daily    Bacterial vaginosis       ondansetron 4 MG tablet    ZOFRAN    40 tablet    Take 1 tablet (4 mg) by mouth every 6 hours as needed for nausea    Pregnancy test positive       prenatal multivitamin plus iron 27-0.8 MG Tabs per tablet      Take 1 tablet by mouth daily    Previous  delivery affecting pregnancy

## 2017-11-27 NOTE — PATIENT INSTRUCTIONS
If you have any questions regarding your visit, Please contact your care team.    Women s Health CLINIC HOURS TELEPHONE NUMBER   MD Christine Heart CMA Lisa -    AMBIKA Maldonado RN       Monday:       7:30-4:30 [place]  Wednesday:       7:30-4:30 [place]  Thursday:       7:30-1:30 [place]  Friday:       7:30-11:30 [facility]  [address] [facility] [place]  [address]  [phone] ask for [facility]  [address] [place] [address]  [phone] ask for [facility]    Imaging Scheduling for [place]:  [phone]    Imaging Scheduling for [place]: [phone]       Urgent Care locations:    [facility] Saturday and Sunday   9 am - 5 pm    Monday-Friday   12 pm - 8 pm  Saturday and Sunday   9 am - 5 pm   [phone]     [facility] Labor and Delivery:  [phone]    If you need a medication refill, please contact your pharmacy. Please allow 3 business days for your refill to be completed.  As always, Thank you for trusting us with your healthcare needs!

## 2017-11-27 NOTE — LETTER
[facility]  [address] [facility] [id number] [phone]    Hansa Ta    To whom it may concern:    Hansa is under our care for her pregnancy.  She is 13w0d with a Estimated Date of Delivery: June 4, 2018  Please limit her shifts to 6 hours to minimize the issues she is having standing.  Otherwise, she can work without other restriction.    Pete Cantu MD FACOG  November 27, 2017

## 2017-12-20 ENCOUNTER — PRENATAL OFFICE VISIT (OUTPATIENT)
Dept: OBGYN | Facility: CLINIC | Age: 27
End: 2017-12-20
Payer: COMMERCIAL

## 2017-12-20 VITALS
WEIGHT: 169.6 LBS | TEMPERATURE: 96.5 F | HEIGHT: 64 IN | BODY MASS INDEX: 28.95 KG/M2 | SYSTOLIC BLOOD PRESSURE: 104 MMHG | DIASTOLIC BLOOD PRESSURE: 64 MMHG | HEART RATE: 94 BPM

## 2017-12-20 DIAGNOSIS — O34.219 PREVIOUS CESAREAN DELIVERY AFFECTING PREGNANCY: Primary | ICD-10-CM

## 2017-12-20 PROCEDURE — 99207 ZZC PRENATAL VISIT: CPT | Performed by: NURSE PRACTITIONER

## 2017-12-20 ASSESSMENT — PAIN SCALES - GENERAL: PAINLEVEL: NO PAIN (0)

## 2017-12-20 NOTE — NURSING NOTE
"Chief Complaint   Patient presents with     Prenatal Care     16w2d       Initial /64  Pulse 94  Temp 96.5  F (35.8  C) (Tympanic)  Ht 5' 4.25\" (1.632 m)  Wt 169 lb 9.6 oz (76.9 kg)  LMP 09/08/2017 (Approximate)  BMI 28.89 kg/m2 Estimated body mass index is 28.89 kg/(m^2) as calculated from the following:    Height as of this encounter: 5' 4.25\" (1.632 m).    Weight as of this encounter: 169 lb 9.6 oz (76.9 kg)..  BP completed using cuff size: nikia Cassidy CMA    "

## 2017-12-20 NOTE — MR AVS SNAPSHOT
"              After Visit Summary   2017    Hansa Ta    MRN: [id number]           Patient Information     Date Of Birth          1990        Visit Information        Provider Department      2017 12:10 PM Ignacia Batista APRN CNP [facility] [place]        Today's Diagnoses     Previous  delivery affecting pregnancy    -  1       Follow-ups after your visit        Future tests that were ordered for you today     Open Future Orders        Priority Expected Expires Ordered    US OB > 14 Weeks Complete Single Routine  3/20/2018 2017            Who to contact     If you have questions or need follow up information about today's clinic visit or your schedule please contact [facility] directly at [phone].  Normal or non-critical lab and imaging results will be communicated to you by MyChart, letter or phone within 4 business days after the clinic has received the results. If you do not hear from us within 7 days, please contact the clinic through [employer] or phone. If you have a critical or abnormal lab result, we will notify you by phone as soon as possible.  Submit refill requests through [employer] or call your pharmacy and they will forward the refill request to us. Please allow 3 business days for your refill to be completed.          Additional Information About Your Visit        MyChart Information     [employer] lets you send messages to your doctor, view your test results, renew your prescriptions, schedule appointments and more. To sign up, go to [email or web address] . Click on \"Log in\" on the left side of the screen, which will take you to the Welcome page. Then click on \"Sign up Now\" on the right side of the page.     You will be asked to enter the access code listed below, as well as some personal information. Please follow the directions to create your username and password.     Your access code is: [email or web address]  Expires: 2018 11:00 AM   " "  Your access code will  in 90 days. If you need help or a new code, please call your [place] clinic or [phone].        Care EveryWhere ID     This is your Care EveryWhere ID. This could be used by other organizations to access your [place] medical records  [id number]        Your Vitals Were     Pulse Temperature Height Last Period BMI (Body Mass Index)       94 96.5  F (35.8  C) (Tympanic) 5' 4.25\" (1.632 m) 2017 (Approximate) 28.89 kg/m2        Blood Pressure from Last 3 Encounters:   17 104/64   17 94/68   10/23/17 96/66    Weight from Last 3 Encounters:   17 169 lb 9.6 oz (76.9 kg)   17 172 lb 3.2 oz (78.1 kg)   10/23/17 175 lb 9.6 oz (79.7 kg)                 Today's Medication Changes          These changes are accurate as of: 17 12:38 PM.  If you have any questions, ask your nurse or doctor.               Stop taking these medicines if you haven't already. Please contact your care team if you have questions.     ibuprofen 600 MG tablet   Commonly known as:  ADVIL/MOTRIN   Stopped by:  Ignacia Batista APRN CNP           metroNIDAZOLE 500 MG tablet   Commonly known as:  FLAGYL   Stopped by:  Ignacia Batista APRN CNP                    Primary Care Provider Office Phone # Fax #    [facility] [phone] [address] [facility] [address]        Equal Access to Services     CATARINO ECHEVARRIA : Hadii aad ku hadasho Soomaali, waaxda luqadaha, qaybta kaalmada rosa edgar. So [facility] [phone].    ATENCIÓN: Si habla español, tiene a espinosa disposición servicios gratuitos de asistencia lingüística. Llame al [phone].    We comply with applicable federal civil rights laws and [place] laws. We do not discriminate on the basis of race, color, national origin, age, disability, sex, sexual orientation, or gender identity.            Thank you!     Thank you for choosing [facility]  for your care. Our goal is always to provide you with excellent care. Hearing back from our patients is one way we can continue to improve our services. Please take a few minutes to complete the written survey that you may receive in the mail after your visit with us. Thank you!             Your Updated Medication List - Protect others around you: Learn how to safely use, store and throw away your medicines at www.disposemymeds.org.          This list is accurate as of: 17 12:38 PM.  Always use your most recent med list.                   Brand Name Dispense Instructions for use Diagnosis    ondansetron 4 MG tablet    ZOFRAN    40 tablet    Take 1 tablet (4 mg) by mouth every 6 hours as needed for nausea    Pregnancy test positive       prenatal multivitamin plus iron 27-0.8 MG Tabs per tablet      Take 1 tablet by mouth daily    Previous  delivery affecting pregnancy

## 2018-01-19 ENCOUNTER — TRANSFERRED RECORDS (OUTPATIENT)
Dept: HEALTH INFORMATION MANAGEMENT | Facility: CLINIC | Age: 28
End: 2018-01-19

## 2018-01-22 ENCOUNTER — PRENATAL OFFICE VISIT (OUTPATIENT)
Dept: OBGYN | Facility: CLINIC | Age: 28
End: 2018-01-22
Payer: COMMERCIAL

## 2018-01-22 VITALS
SYSTOLIC BLOOD PRESSURE: 106 MMHG | DIASTOLIC BLOOD PRESSURE: 66 MMHG | HEIGHT: 64 IN | HEART RATE: 81 BPM | WEIGHT: 170.2 LBS | TEMPERATURE: 97 F | BODY MASS INDEX: 29.06 KG/M2

## 2018-01-22 DIAGNOSIS — O34.219 PREVIOUS CESAREAN DELIVERY AFFECTING PREGNANCY: Primary | ICD-10-CM

## 2018-01-22 PROCEDURE — 99207 ZZC PRENATAL VISIT: CPT | Performed by: NURSE PRACTITIONER

## 2018-01-22 ASSESSMENT — PAIN SCALES - GENERAL: PAINLEVEL: NO PAIN (0)

## 2018-01-22 NOTE — LETTER
[facility] [place]  [address] [place] [id number]  [phone]      RE: Hansa Ta  : 1990    DATE: 2018      To Whom It May Concern,        Hansa Ta was seen in the clinic today. She can return to work on .        Thank you.      Sincerely,            Ignacia ASIF CNP

## 2018-01-22 NOTE — PROGRESS NOTES
Patient presents for routine prenatal visit. Prenatal flowsheet reviewed and updated as needed.  Denies vaginal bleeding, loss of fluid, contractions or cramping.  Patient with complaint.  was diagnosed with Influenza A in ED after suffering a seizure 3 days ago. Patient was given Tamiflu as well-was having mild diarrhea and nausea, but no other flu symptoms. Tamiflu was causing abdominal pain and discontinued it yesterday. She is feeling improvement in her symptoms, appetite slowly improving, staying hydrated. Prefers not to restart Tamiflu. Needs letter to return to work on Thursday-this is provided but she is aware she needs to stay out of work if symptoms worsen and will then notify clinic. Parameters to monitor for and report discussed.    Patient has not yet scheduled ultrasound-had 4D ultrasound outside of  and thought this was enough-discussed that she still needs formal anatomy survey and she will schedule.  Discussed 1 hour GTT and HGB on return to clinic.    Advice as per Anticipatory Guidance/Checklist updated.  PE: See OB vitals    Questions asked and answered. Next OB visit in 4 week(s) with Dr. Cantu.    Ignacia ASIF CNP

## 2018-01-22 NOTE — MR AVS SNAPSHOT
"              After Visit Summary   2018    Hansa Ta    MRN: [id number]           Patient Information     Date Of Birth          1990        Visit Information        Provider Department      2018 1:50 PM Ignacia Batista APRN CNP [facility]        Today's Diagnoses     Previous  delivery affecting pregnancy    -  1       Follow-ups after your visit        Future tests that were ordered for you today     Open Future Orders        Priority Expected Expires Ordered    Hemoglobin Routine  2018    Glucose tolerance gest screen 1 hour Routine  2018            Who to contact     If you have questions or need follow up information about today's clinic visit or your schedule please contact [facility] directly at [phone].  Normal or non-critical lab and imaging results will be communicated to you by MyChart, letter or phone within 4 business days after the clinic has received the results. If you do not hear from us within 7 days, please contact the clinic through [employer] or phone. If you have a critical or abnormal lab result, we will notify you by phone as soon as possible.  Submit refill requests through [employer] or call your pharmacy and they will forward the refill request to us. Please allow 3 business days for your refill to be completed.          Additional Information About Your Visit        [employer] Information     [employer] lets you send messages to your doctor, view your test results, renew your prescriptions, schedule appointments and more. To sign up, go to [email or web address] . Click on \"Log in\" on the left side of the screen, which will take you to the Welcome page. Then click on \"Sign up Now\" on the right side of the page.     You will be asked to enter the access code listed below, as well as some personal information. Please follow the directions to create your username and password.     Your access code is: " [email or web address]  Expires: 2018  7:17 AM     Your access code will  in 90 days. If you need help or a new code, please call your [place] clinic or [phone].        Care EveryWhere ID     This is your Care EveryWhere ID. This could be used by other organizations to access your [place] medical records  [id number]        Your Vitals Were     Pulse Temperature Height Last Period BMI (Body Mass Index)       81 97  F (36.1  C) (Oral) 5' 4.25\" (1.632 m) 2017 (Approximate) 28.99 kg/m2        Blood Pressure from Last 3 Encounters:   18 106/66   17 104/64   17 94/68    Weight from Last 3 Encounters:   18 170 lb 3.2 oz (77.2 kg)   17 169 lb 9.6 oz (76.9 kg)   17 172 lb 3.2 oz (78.1 kg)               Primary Care Provider Office Phone # Fax #    [facility] [phone] [address] [address]        Equal Access to Services     CATARINO ECHEVARRIA AH: Hadii jeet lugo hadasho Soomaali, waaxda luqadaha, qaybta kaalmada adeegyada, rosa jett. So [facility] [place] [phone].    ATENCIÓN: Si habla español, tiene a espinosa disposición servicios gratuitos de asistencia lingüística. Llame al [phone].    We comply with applicable federal civil rights laws and [place] laws. We do not discriminate on the basis of race, color, national origin, age, disability, sex, sexual orientation, or gender identity.            Thank you!     Thank you for choosing [facility]  for your care. Our goal is always to provide you with excellent care. Hearing back from our patients is one way we can continue to improve our services. Please take a few minutes to complete the written survey that you may receive in the mail after your visit with us. Thank you!             Your Updated Medication List - Protect others around you: Learn how to safely use, store and throw away your medicines at www.disposemymeds.org.          This list is " accurate as of: 18  3:14 PM.  Always use your most recent med list.                   Brand Name Dispense Instructions for use Diagnosis    ondansetron 4 MG tablet    ZOFRAN    40 tablet    Take 1 tablet (4 mg) by mouth every 6 hours as needed for nausea    Pregnancy test positive       prenatal multivitamin plus iron 27-0.8 MG Tabs per tablet      Take 1 tablet by mouth daily    Previous  delivery affecting pregnancy

## 2018-01-22 NOTE — NURSING NOTE
"Chief Complaint   Patient presents with     Prenatal Care     21w0d       Initial /66  Pulse 81  Temp 97  F (36.1  C) (Oral)  Ht 5' 4.25\" (1.632 m)  Wt 170 lb 3.2 oz (77.2 kg)  LMP 09/08/2017 (Approximate)  BMI 28.99 kg/m2 Estimated body mass index is 28.99 kg/(m^2) as calculated from the following:    Height as of this encounter: 5' 4.25\" (1.632 m).    Weight as of this encounter: 170 lb 3.2 oz (77.2 kg)..  BP completed using cuff size: nikia Cassidy CMA    "

## 2018-02-23 ENCOUNTER — PRENATAL OFFICE VISIT (OUTPATIENT)
Dept: OBGYN | Facility: CLINIC | Age: 28
End: 2018-02-23
Payer: COMMERCIAL

## 2018-02-23 VITALS
WEIGHT: 179.2 LBS | HEART RATE: 91 BPM | BODY MASS INDEX: 30.59 KG/M2 | TEMPERATURE: 98 F | HEIGHT: 64 IN | DIASTOLIC BLOOD PRESSURE: 77 MMHG | SYSTOLIC BLOOD PRESSURE: 113 MMHG | OXYGEN SATURATION: 100 %

## 2018-02-23 DIAGNOSIS — O34.219 PREVIOUS CESAREAN DELIVERY AFFECTING PREGNANCY: ICD-10-CM

## 2018-02-23 DIAGNOSIS — N89.8 VAGINAL IRRITATION: ICD-10-CM

## 2018-02-23 DIAGNOSIS — O34.219 PREVIOUS CESAREAN DELIVERY AFFECTING PREGNANCY: Primary | ICD-10-CM

## 2018-02-23 LAB
GLUCOSE 1H P 50 G GLC PO SERPL-MCNC: 115 MG/DL (ref 60–129)
HGB BLD-MCNC: 11.5 G/DL (ref 11.7–15.7)
SPECIMEN SOURCE: NORMAL
WET PREP SPEC: NORMAL

## 2018-02-23 PROCEDURE — 99207 ZZC PRENATAL VISIT: CPT | Performed by: NURSE PRACTITIONER

## 2018-02-23 PROCEDURE — 85018 HEMOGLOBIN: CPT | Performed by: NURSE PRACTITIONER

## 2018-02-23 PROCEDURE — 82950 GLUCOSE TEST: CPT | Performed by: NURSE PRACTITIONER

## 2018-02-23 PROCEDURE — 87210 SMEAR WET MOUNT SALINE/INK: CPT | Performed by: NURSE PRACTITIONER

## 2018-02-23 PROCEDURE — 36415 COLL VENOUS BLD VENIPUNCTURE: CPT | Performed by: NURSE PRACTITIONER

## 2018-02-23 ASSESSMENT — PAIN SCALES - GENERAL: PAINLEVEL: MODERATE PAIN (5)

## 2018-02-23 NOTE — PROGRESS NOTES
Patient presents for routine prenatal visit. Prenatal flowsheet reviewed and updated as needed.  Denies vaginal bleeding, loss of fluid, contractions or cramping.  Patient with complaint. Having vulvar irritation during and just after intercourse. Admits it feels dry, not using lubricants. Has had yeast vaginitis and BV this pregnancy, wants to be sure there is no ongoing infection. No abnormal discharge, itching, odor.  1 hour GTT and HGB today.  Still has not yet scheduled screening ultrasound. Patient had 4D ultrasound done outside of  but did confirm that full anatomy screening was not completed, encouraged to schedule this soon. In addition to anatomy, will check measurements as patient has only had 4 lb weight gain this pregnancy.  Plan Tdap on return to clinic  On return to clinic will need to schedule c/section-still undecided on anesthesia type.   Advice as per Anticipatory Guidance/Checklist updated.  PE: See OB vitals  Wet prep negative for infection.     Questions asked and answered. Next OB visit in 4 week(s) with Dr. Cantu.    Ignacia ASIF CNP

## 2018-02-23 NOTE — PROGRESS NOTES
"  SUBJECTIVE:   Hansa Ta is a 27 year old female who presents to clinic today for the following health issues:      Vaginal Symptoms  Onset: 2 months ago    Description:  Vaginal Discharge: none   Itching (Pruritis): YES  Burning sensation:  YES  Odor: no     Accompanying Signs & Symptoms:  Pain with Urination: no   Abdominal Pain: no   Fever: no     History:   Sexually active: YES  New Partner: no   Possibility of Pregnancy:  Yes, currently 25w4d    Precipitating factors:   Recent Antibiotic Use: no     Alleviating factors:  none    Therapies Tried and outcome: diflucan and metrogel- temporary relief    {additional problems for provider to add:861326}    Problem list and histories reviewed & adjusted, as indicated.  Additional history: {NONE - AS DOCUMENTED:214056::\"as documented\"}    {HIST REVIEW/ LINKS 2:622769}    Reviewed and updated as needed this visit by clinical staff  Tobacco  Allergies  Meds  Problems  Med Hx  Surg Hx  Fam Hx  Soc Hx        Reviewed and updated as needed this visit by Provider  Tobacco  Allergies  Meds  Problems  Med Hx  Surg Hx  Fam Hx  Soc Hx          {PROVIDER CHARTING PREFERENCE:833027}  "

## 2018-02-23 NOTE — MR AVS SNAPSHOT
After Visit Summary   2018    Hansa Ta    MRN: [id number]           Patient Information     Date Of Birth          1990        Visit Information        Provider Department      2018 2:50 PM Ignacia Batista APRN CNP [facility]        Today's Diagnoses     Previous  delivery affecting pregnancy    -  1    Vaginal irritation           Follow-ups after your visit        Your next 10 appointments already scheduled     2018  3:00 PM CST   (Arrive by 2:45 PM)   US OB > 14 WEEKS COMPLETE SINGLE with ANDUS1   [facility] ([facility]    [address] [facility] [id number] [phone]           Please bring a list of your medicines (including vitamins, minerals and over-the-counter drugs). Also, tell your doctor about any allergies you may have. Wear comfortable clothes and leave your valuables at home.  If you re less than 20 weeks drink four 8-ounce glasses of fluid an hour before your exam. If you need to empty your bladder before your exam, try to release only a little urine. Then, drink another glass of fluid.  You may have up to two family members in the exam room. If you bring a small child, an adult must be there to care for him or her.  Please call the Imaging Department at your exam site with any questions.              Who to contact     If you have questions or need follow up information about today's clinic visit or your schedule please contact [facility] directly at [phone].  Normal or non-critical lab and imaging results will be communicated to you by MyChart, letter or phone within 4 business days after the clinic has received the results. If you do not hear from us within 7 days, please contact the clinic through MyChart or phone. If you have a critical or abnormal lab result, we will notify you by phone as soon as possible.  Submit refill requests through [employer] or call your  "pharmacy and they will forward the refill request to us. Please allow 3 business days for your refill to be completed.          Additional Information About Your Visit        MyChart Information     HoozOn lets you send messages to your doctor, view your test results, renew your prescriptions, schedule appointments and more. To sign up, go to [email or web address] . Click on \"Log in\" on the left side of the screen, which will take you to the Welcome page. Then click on \"Sign up Now\" on the right side of the page.     You will be asked to enter the access code listed below, as well as some personal information. Please follow the directions to create your username and password.     Your access code is: [email or web address]  Expires: 2018  7:17 AM     Your access code will  in 90 days. If you need help or a new code, please call your [place] clinic or [phone].        Care EveryWhere ID     This is your [facility] EveryWhere ID. This could be used by other organizations to access your [place] medical records  [id number]        Your Vitals Were     Pulse Temperature Height Last Period Pulse Oximetry BMI (Body Mass Index)    91 98  F (36.7  C) (Tympanic) 5' 4.25\" (1.632 m) 2017 (Approximate) 100% 30.52 kg/m2       Blood Pressure from Last 3 Encounters:   18 113/77   18 106/66   17 104/64    Weight from Last 3 Encounters:   18 179 lb 3.2 oz (81.3 kg)   18 170 lb 3.2 oz (77.2 kg)   17 169 lb 9.6 oz (76.9 kg)              We Performed the Following     Wet prep        Primary Care Provider Office Phone # Fax #    [facility] [phone] [address] [facility] [address]        Equal Access to Services     CATARINO ECHEVARRIA : Gisel Heaton, wamaximus luqjacoby, qarosa apodaca . So [facility] [phone].    ATENCIÓN: Si habla español, tiene a espinosa disposición servicios gratuitos de " asistencia lingüística. Justin al [phone].    We comply with applicable federal civil rights laws and [place] laws. We do not discriminate on the basis of race, color, national origin, age, disability, sex, sexual orientation, or gender identity.            Thank you!     Thank you for choosing [facility]  [facility]. Our goal is always to provide you with excellent care. Hearing back from our patients is one way we can continue to improve our services. Please take a few minutes to complete the written survey that you may receive in the mail after your visit with us. Thank you!             Your Updated Medication List - Protect others around you: Learn how to safely use, store and throw away your medicines at www.disposemymeds.org.          This list is accurate as of 18  3:19 PM.  Always use your most recent med list.                   Brand Name Dispense Instructions for use Diagnosis    ondansetron 4 MG tablet    ZOFRAN    40 tablet    Take 1 tablet (4 mg) by mouth every 6 hours as needed for nausea    Pregnancy test positive       prenatal multivitamin plus iron 27-0.8 MG Tabs per tablet      Take 1 tablet by mouth daily    Previous  delivery affecting pregnancy

## 2018-02-23 NOTE — NURSING NOTE
"Chief Complaint   Patient presents with     Prenatal Care     25w4d       Initial /77  Pulse 91  Temp 98  F (36.7  C) (Tympanic)  Ht 5' 4.25\" (1.632 m)  Wt 179 lb 3.2 oz (81.3 kg)  LMP 09/08/2017 (Approximate)  SpO2 100%  BMI 30.52 kg/m2 Estimated body mass index is 30.52 kg/(m^2) as calculated from the following:    Height as of this encounter: 5' 4.25\" (1.632 m).    Weight as of this encounter: 179 lb 3.2 oz (81.3 kg)..  BP completed using cuff size: nikia Cassidy CMA    "

## 2018-02-26 ENCOUNTER — RADIANT APPOINTMENT (OUTPATIENT)
Dept: ULTRASOUND IMAGING | Facility: CLINIC | Age: 28
End: 2018-02-26
Attending: NURSE PRACTITIONER
Payer: COMMERCIAL

## 2018-02-26 DIAGNOSIS — O34.219 PREVIOUS CESAREAN DELIVERY AFFECTING PREGNANCY: ICD-10-CM

## 2018-02-26 PROCEDURE — 76805 OB US >/= 14 WKS SNGL FETUS: CPT

## 2018-03-22 ENCOUNTER — RADIANT APPOINTMENT (OUTPATIENT)
Dept: ULTRASOUND IMAGING | Facility: CLINIC | Age: 28
End: 2018-03-22
Attending: NURSE PRACTITIONER
Payer: COMMERCIAL

## 2018-03-22 DIAGNOSIS — O34.219 PREVIOUS CESAREAN DELIVERY AFFECTING PREGNANCY: ICD-10-CM

## 2018-03-22 PROCEDURE — 76816 OB US FOLLOW-UP PER FETUS: CPT

## 2018-03-26 ENCOUNTER — PRENATAL OFFICE VISIT (OUTPATIENT)
Dept: OBGYN | Facility: CLINIC | Age: 28
End: 2018-03-26
Payer: COMMERCIAL

## 2018-03-26 VITALS
TEMPERATURE: 97.4 F | HEART RATE: 94 BPM | OXYGEN SATURATION: 98 % | SYSTOLIC BLOOD PRESSURE: 102 MMHG | WEIGHT: 181 LBS | DIASTOLIC BLOOD PRESSURE: 67 MMHG | BODY MASS INDEX: 30.83 KG/M2

## 2018-03-26 DIAGNOSIS — O34.219 PREVIOUS CESAREAN DELIVERY AFFECTING PREGNANCY: ICD-10-CM

## 2018-03-26 DIAGNOSIS — Z34.80 PRENATAL CARE, SUBSEQUENT PREGNANCY, UNSPECIFIED TRIMESTER: Primary | ICD-10-CM

## 2018-03-26 PROCEDURE — 99207 ZZC PRENATAL VISIT: CPT | Performed by: OBSTETRICS & GYNECOLOGY

## 2018-03-26 NOTE — PATIENT INSTRUCTIONS
If you have any questions regarding your visit, Please contact your care team.    Women s Health CLINIC HOURS TELEPHONE NUMBER   MD Christine Heart CMA Lisa -    AMBIKA Kim       Monday:       7:30-4:30 [place]  Wednesday:       7:30-4:30 [place]  Thursday:       7:30-1:30 [place]  Friday:       7:30-11:30 [facility]  [address] [place]  [address]  [phone] ask for [facility]  [address] [place] [address]  [phone] ask for [facility]    Imaging Scheduling for [place]:  [phone]    Imaging Scheduling for [place]: [phone]       Urgent Care locations:    [facility] Saturday and Sunday   9 am - 5 pm    Monday-Friday   12 pm - 8 pm  Saturday and Sunday   9 am - 5 pm   [phone]     [facility] Labor and Delivery:  [phone]    If you need a medication refill, please contact your pharmacy. Please allow 3 business days for your refill to be completed.  As always, Thank you for trusting us with your healthcare needs!

## 2018-03-26 NOTE — PROGRESS NOTES
Patient presents for routine prenatal visit at 30w0d.  Patient without complaint. Ultrasound shows 52%ile growth.  She wants to consider TOLAC.  Will repeat ultrasound at 36 weeks  PE: See OB vitals  Body mass index is 30.83 kg/(m^2).    Questions asked and answered.    Pete Cantu MD FACOG

## 2018-03-26 NOTE — MR AVS SNAPSHOT
After Visit Summary   3/26/2018    Hansa Ta    MRN: [id number]           Patient Information     Date Of Birth          1990        Visit Information        Provider Department      3/26/2018 12:15 PM Pete Cantu MD [facility] [place]        Today's Diagnoses     Prenatal care, subsequent pregnancy, unspecified trimester    -  1    Previous  delivery affecting pregnancy          Care Instructions                                                         If you have any questions regarding your visit, Please contact your care team.    Women s Health CLINIC HOURS TELEPHONE NUMBER   MD Christine Heart CMA Lisa -    AMBIKA Kim       Monday:       7:30-4:30 [place]  Wednesday:       7:30-4:30 [place]  Thursday:       7:30-1:30 [place]  Friday:       7:30-11:30 [facility]  [address] [place], [place]  [id number] [phone] ask for [facility]  [address] [place] [address]  [phone] ask for [facility]    Imaging Scheduling for [place]:  [phone]    Imaging Scheduling for [place]: [phone]       Urgent Care locations:    [facility] Saturday and    9 am - 5 pm    Monday-Friday   12 pm - 8 pm  Saturday and    9 am - 5 pm   [phone]     [facility] Labor and Delivery:  [phone]    If you need a medication refill, please contact your pharmacy. Please allow 3 business days for your refill to be completed.  As always, Thank you for trusting us with your healthcare needs!              Follow-ups after your visit        Follow-up notes from your care team     Return in about 2 weeks (around 2018) for Prenatal Visit.      Who to contact     If you have questions or need follow up information about today's clinic visit or your schedule please contact [facility] directly at  "[phone].  Normal or non-critical lab and imaging results will be communicated to you by MyChart, letter or phone within 4 business days after the clinic has received the results. If you do not hear from us within 7 days, please contact the clinic through [employer] or phone. If you have a critical or abnormal lab result, we will notify you by phone as soon as possible.  Submit refill requests through InMy[employer] or call your pharmacy and they will forward the refill request to us. Please allow 3 business days for your refill to be completed.          Additional Information About Your Visit        [employer] Information     InMyShow lets you send messages to your doctor, view your test results, renew your prescriptions, schedule appointments and more. To sign up, go to [email or web address] . Click on \"Log in\" on the left side of the screen, which will take you to the Welcome page. Then click on \"Sign up Now\" on the right side of the page.     You will be asked to enter the access code listed below, as well as some personal information. Please follow the directions to create your username and password.     Your access code is: [email or web address]  Expires: 2018  8:17 AM     Your access code will  in 90 days. If you need help or a new code, please call your [place] clinic or [phone].        Care EveryWhere ID     This is your Care EveryWhere ID. This could be used by other organizations to access your [place] medical records  [id number]        Your Vitals Were     Pulse Temperature Last Period Pulse Oximetry BMI (Body Mass Index)       94 97.4  F (36.3  C) (Oral) 2017 (Approximate) 98% 30.83 kg/m2        Blood Pressure from Last 3 Encounters:   18 102/67   18 113/77   18 106/66    Weight from Last 3 Encounters:   18 181 lb (82.1 kg)   18 179 lb 3.2 oz (81.3 kg)   18 170 lb 3.2 oz (77.2 kg)              Today, you had the following     No orders found for display       " Primary Care Provider Office Phone # Fax #    [facility] [phone] [address] [address]        Equal Access to Services     CATARINO ECHEVARRIA : Hadii aad ku haddonnao Sosantyali, waaxda luqadaha, qaybta kaalmada damionda, rosa truong katherynki benavidez herminio jett. So [facility] [phone].    ATENCIÓN: Si habla español, tiene a espinosa disposición servicios gratuitos de asistencia lingüística. Llame al [phone].    We comply with applicable federal civil rights laws and [place] laws. We do not discriminate on the basis of race, color, national origin, age, disability, sex, sexual orientation, or gender identity.            Thank you!     Thank you for choosing [facility]  for your care. Our goal is always to provide you with excellent care. Hearing back from our patients is one way we can continue to improve our services. Please take a few minutes to complete the written survey that you may receive in the mail after your visit with us. Thank you!             Your Updated Medication List - Protect others around you: Learn how to safely use, store and throw away your medicines at www.disposemymeds.org.          This list is accurate as of 3/26/18 12:32 PM.  Always use your most recent med list.                   Brand Name Dispense Instructions for use Diagnosis    ondansetron 4 MG tablet    ZOFRAN    40 tablet    Take 1 tablet (4 mg) by mouth every 6 hours as needed for nausea    Pregnancy test positive       prenatal multivitamin plus iron 27-0.8 MG Tabs per tablet      Take 1 tablet by mouth daily    Previous  delivery affecting pregnancy

## 2018-03-26 NOTE — NURSING NOTE
"Chief Complaint   Patient presents with     Prenatal Care     30w       Initial /67  Pulse 94  Temp 97.4  F (36.3  C) (Oral)  Wt 181 lb (82.1 kg)  LMP 09/08/2017 (Approximate)  SpO2 98%  BMI 30.83 kg/m2 Estimated body mass index is 30.83 kg/(m^2) as calculated from the following:    Height as of 2/23/18: 5' 4.25\" (1.632 m).    Weight as of this encounter: 181 lb (82.1 kg).  Medication Reconciliation: complete   Christine Palomino CMA      "

## 2018-04-19 ENCOUNTER — PRENATAL OFFICE VISIT (OUTPATIENT)
Dept: OBGYN | Facility: CLINIC | Age: 28
End: 2018-04-19
Payer: COMMERCIAL

## 2018-04-19 VITALS
HEART RATE: 98 BPM | HEIGHT: 64 IN | BODY MASS INDEX: 31.72 KG/M2 | SYSTOLIC BLOOD PRESSURE: 114 MMHG | TEMPERATURE: 97.4 F | WEIGHT: 185.8 LBS | OXYGEN SATURATION: 97 % | DIASTOLIC BLOOD PRESSURE: 70 MMHG

## 2018-04-19 DIAGNOSIS — O34.219 PREVIOUS CESAREAN DELIVERY AFFECTING PREGNANCY: Primary | ICD-10-CM

## 2018-04-19 DIAGNOSIS — Z23 NEED FOR TDAP VACCINATION: ICD-10-CM

## 2018-04-19 PROCEDURE — 99207 ZZC PRENATAL VISIT: CPT | Performed by: NURSE PRACTITIONER

## 2018-04-19 PROCEDURE — 90715 TDAP VACCINE 7 YRS/> IM: CPT | Performed by: NURSE PRACTITIONER

## 2018-04-19 ASSESSMENT — PAIN SCALES - GENERAL: PAINLEVEL: NO PAIN (0)

## 2018-04-19 NOTE — PROGRESS NOTES
Patient presents for routine prenatal visit. Prenatal flowsheet reviewed and updated as needed.  Denies vaginal bleeding, loss of fluid, contractions or cramping.  Patient without complaint. Tdap today.  [place] paperwork completed.  At next visit, order growth ultrasound.   Advice as per Anticipatory Guidance/Checklist updated.  PE: See OB vitals    Questions asked and answered. Next OB visit in 2 week(s) with Dr. Cantu.    Ignacia ASIF CNP       English

## 2018-04-19 NOTE — NURSING NOTE
"Chief Complaint   Patient presents with     Prenatal Care     33w3d       Initial /70  Pulse 98  Temp 97.4  F (36.3  C) (Oral)  Ht 5' 4.25\" (1.632 m)  Wt 185 lb 12.8 oz (84.3 kg)  LMP 09/08/2017 (Approximate)  SpO2 97%  BMI 31.64 kg/m2 Estimated body mass index is 31.64 kg/(m^2) as calculated from the following:    Height as of this encounter: 5' 4.25\" (1.632 m).    Weight as of this encounter: 185 lb 12.8 oz (84.3 kg)..  BP completed using cuff size: regular    Screening Questionnaire for Adult Immunization    Are you sick today?   No   Do you have allergies to medications, food, a vaccine component or latex?   No   Have you ever had a serious reaction after receiving a vaccination?   No   Do you have a long-term health problem with heart disease, lung disease, asthma, kidney disease, metabolic disease (e.g. diabetes), anemia, or other blood disorder?   No   Do you have cancer, leukemia, HIV/AIDS, or any other immune system problem?   No   In the past 3 months, have you taken medications that affect  your immune system, such as prednisone, other steroids, or anticancer drugs; drugs for the treatment of rheumatoid arthritis, Crohn s disease, or psoriasis; or have you had radiation treatments?   No   Have you had a seizure, or a brain or other nervous system problem?   No   During the past year, have you received a transfusion of blood or blood     products, or been given immune (gamma) globulin or antiviral drug?   No   For women: Are you pregnant or is there a chance you could become        pregnant during the next month?   Yes   Have you received any vaccinations in the past 4 weeks?   No     Immunization questionnaire was positive for at least one answer.  Notified Ignacia ASIF CNP.        Per orders of Ignacia ASIF CNP, injection of Tdap given by Nandini Cassidy. Patient instructed to remain in clinic for 15 minutes afterwards, and to report any adverse reaction to me immediately.     "   Screening performed by Nandini Cassidy on 4/19/2018 at 12:11 PM.        Nandini Cassidy CMA

## 2018-04-19 NOTE — MR AVS SNAPSHOT
"              After Visit Summary   2018    Hansa Ta    MRN: [id number]           Patient Information     Date Of Birth          1990        Visit Information        Provider Department      2018 12:10 PM Ignacia Batista APRN CNP [facility]        Today's Diagnoses     Previous  delivery affecting pregnancy    -  1    Need for Tdap vaccination           Follow-ups after your visit        Your next 10 appointments already scheduled     May 03, 2018  1:00 PM CDT   ESTABLISHED PRENATAL with Pete Cantu MD   [facility]    [address] [facility] [address] [phone]              Who to contact     If you have questions or need follow up information about today's clinic visit or your schedule please contact [facility] directly at [phone].  Normal or non-critical lab and imaging results will be communicated to you by MyChart, letter or phone within 4 business days after the clinic has received the results. If you do not hear from us within 7 days, please contact the clinic through MyChart or phone. If you have a critical or abnormal lab result, we will notify you by phone as soon as possible.  Submit refill requests through [employer] or call your pharmacy and they will forward the refill request to us. Please allow 3 business days for your refill to be completed.          Additional Information About Your Visit        MyChart Information     [employer] lets you send messages to your doctor, view your test results, renew your prescriptions, schedule appointments and more. To sign up, go to [email or web address] . Click on \"Log in\" on the left side of the screen, which will take you to the Welcome page. Then click on \"Sign up Now\" on the right side of the page.     You will be asked to enter the access code listed below, as well as some personal information. Please follow the directions to create " "your username and password.     Your access code is: [email or web address]  Expires: 2018  8:17 AM     Your access code will  in 90 days. If you need help or a new code, please call your [facility] or [phone].        Care EveryWhere ID     This is your Care EveryWhere ID. This could be used by other organizations to access your [place] medical records  [id number]        Your Vitals Were     Pulse Temperature Height Last Period Pulse Oximetry BMI (Body Mass Index)    98 97.4  F (36.3  C) (Oral) 5' 4.25\" (1.632 m) 2017 (Approximate) 97% 31.64 kg/m2       Blood Pressure from Last 3 Encounters:   18 114/70   18 102/67   18 113/77    Weight from Last 3 Encounters:   18 185 lb 12.8 oz (84.3 kg)   18 181 lb (82.1 kg)   18 179 lb 3.2 oz (81.3 kg)              We Performed the Following     TDAP VACCINE (ADACEL)          Today's Medication Changes          These changes are accurate as of 18 12:23 PM.  If you have any questions, ask your nurse or doctor.               Stop taking these medicines if you haven't already. Please contact your care team if you have questions.     ondansetron 4 MG tablet   Commonly known as:  ZOFRAN   Stopped by:  Ignacia Batista APRN CNP                    Primary Care Provider Office Phone # Fax #    [facility] [phone] [address] [address]        Equal Access to Services     YAMEL ECHEVARRIA AH: Hadportia Heaton, manuel delgado, qarosa apodaca. So [facility] [phone].    ATENCIÓN: Si habla español, tiene a espinosa disposición servicios gratuitos de asistencia lingüística. Llame al [phone].    We comply with applicable federal civil rights laws and [place] laws. We do not discriminate on the basis of race, color, national origin, age, disability, sex, sexual orientation, or gender identity.            Thank you!  "    Thank you for choosing [facility]  for your care. Our goal is always to provide you with excellent care. Hearing back from our patients is one way we can continue to improve our services. Please take a few minutes to complete the written survey that you may receive in the mail after your visit with us. Thank you!             Your Updated Medication List - Protect others around you: Learn how to safely use, store and throw away your medicines at www.disposemymeds.org.          This list is accurate as of 18 12:23 PM.  Always use your most recent med list.                   Brand Name Dispense Instructions for use Diagnosis    prenatal multivitamin plus iron 27-0.8 MG Tabs per tablet      Take 1 tablet by mouth daily    Previous  delivery affecting pregnancy

## 2018-05-03 ENCOUNTER — PRENATAL OFFICE VISIT (OUTPATIENT)
Dept: OBGYN | Facility: CLINIC | Age: 28
End: 2018-05-03
Payer: COMMERCIAL

## 2018-05-03 VITALS
WEIGHT: 183.2 LBS | DIASTOLIC BLOOD PRESSURE: 74 MMHG | OXYGEN SATURATION: 99 % | BODY MASS INDEX: 31.2 KG/M2 | SYSTOLIC BLOOD PRESSURE: 107 MMHG | HEART RATE: 86 BPM | TEMPERATURE: 97.5 F

## 2018-05-03 DIAGNOSIS — O34.219 PREVIOUS CESAREAN DELIVERY AFFECTING PREGNANCY: ICD-10-CM

## 2018-05-03 DIAGNOSIS — Z34.80 PRENATAL CARE, SUBSEQUENT PREGNANCY, UNSPECIFIED TRIMESTER: Primary | ICD-10-CM

## 2018-05-03 PROCEDURE — 87653 STREP B DNA AMP PROBE: CPT | Performed by: OBSTETRICS & GYNECOLOGY

## 2018-05-03 PROCEDURE — 99207 ZZC PRENATAL VISIT: CPT | Performed by: OBSTETRICS & GYNECOLOGY

## 2018-05-03 NOTE — LETTER
[facility]  [address] [facility] [address]  Phone: [phone]    18    Hansa Ta  [address] [address]      Dear Hansa-    YNES have reviewed your prenatal labs and they are normal.  If you have questions, we can discuss these at your next pre-angel visit.     Sincerely,      Pete Cantu MD

## 2018-05-03 NOTE — PATIENT INSTRUCTIONS
If you have any questions regarding your visit, Please contact your care team.    Women s Health CLINIC HOURS TELEPHONE NUMBER   MD Christine Heart CMA Lisa -    AMBIKA Kim       Monday:       7:30-4:30 [place]  Wednesday:       7:30-4:30 [place]  Thursday:       7:30-1:30 [place]  Friday:       7:30-11:30 [facility]  [address]  [address]  [phone] ask for [facility]  [address] [place] [address]  [phone] ask for [facility]    Imaging Scheduling for [place]:  [phone]    Imaging Scheduling for [place]: [phone]       Urgent Care locations:    [facility] Saturday and Sunday   9 am - 5 pm    Monday-Friday   12 pm - 8 pm  Saturday and Sunday   9 am - 5 pm   [phone]     [facility] Labor and Delivery:  [phone]    If you need a medication refill, please contact your pharmacy. Please allow 3 business days for your refill to be completed.  As always, Thank you for trusting us with your healthcare needs!

## 2018-05-03 NOTE — MR AVS SNAPSHOT
After Visit Summary   5/3/2018    Hansa Ta    MRN: [id number]           Patient Information     Date Of Birth          1990        Visit Information        Provider Department      5/3/2018 1:00 PM Pete Cantu MD [facility]        Today's Diagnoses     Prenatal care, subsequent pregnancy, unspecified trimester    -  1    Previous  delivery affecting pregnancy          Care Instructions                                                         If you have any questions regarding your visit, Please contact your care team.    Women s Health CLINIC HOURS TELEPHONE NUMBER   MD Christine Heart CMA Lisa -    AMBIKA Kim       Monday:       7:30-4:30 [place]  Wednesday:       7:30-4:30 [place]  Thursday:       7:30-1:30 [place]  Friday:       7:30-11:30 [facility]  [address] [place], [place]  [id number] [phone] ask for [facility]  [address] [place] [address]  [phone] ask for [facility]    Imaging Scheduling for [place]:  [phone]    Imaging Scheduling for [place]: [phone]       Urgent Care locations:    [facility] Saturday and    9 am - 5 pm    Monday-Friday   12 pm - 8 pm  Saturday and    9 am - 5 pm   [phone]     [facility] Labor and Delivery:  [phone]    If you need a medication refill, please contact your pharmacy. Please allow 3 business days for your refill to be completed.  As always, Thank you for trusting us with your healthcare needs!              Follow-ups after your visit        Follow-up notes from your care team     Return in about 1 week (around 5/10/2018) for Prenatal Visit.      Who to contact     If you have questions or need follow up information about today's clinic visit or your schedule please contact [facility] directly at  "[phone].  Normal or non-critical lab and imaging results will be communicated to you by MyChart, letter or phone within 4 business days after the clinic has received the results. If you do not hear from us within 7 days, please contact the clinic through Geoshohart or phone. If you have a critical or abnormal lab result, we will notify you by phone as soon as possible.  Submit refill requests through [facility] or call your pharmacy and they will forward the refill request to us. Please allow 3 business days for your refill to be completed.          Additional Information About Your Visit        GeoshoharAtrica Information     [facility] lets you send messages to your doctor, view your test results, renew your prescriptions, schedule appointments and more. To sign up, go to [email or web address] . Click on \"Log in\" on the left side of the screen, which will take you to the Welcome page. Then click on \"Sign up Now\" on the right side of the page.     You will be asked to enter the access code listed below, as well as some personal information. Please follow the directions to create your username and password.     Your access code is: [email or web address]  Expires: 2018  1:35 PM     Your access code will  in 90 days. If you need help or a new code, please call your [place] clinic or [phone].        Care EveryWhere ID     This is your Care EveryWhere ID. This could be used by other organizations to access your [place] medical records  [id number]        Your Vitals Were     Pulse Temperature Last Period Pulse Oximetry BMI (Body Mass Index)       86 97.5  F (36.4  C) (Oral) 2017 (Approximate) 99% 31.2 kg/m2        Blood Pressure from Last 3 Encounters:   18 107/74   18 114/70   18 102/67    Weight from Last 3 Encounters:   18 183 lb 3.2 oz (83.1 kg)   18 185 lb 12.8 oz (84.3 kg)   18 181 lb (82.1 kg)              We Performed the Following     Group B strep PCR     US Bedside Non " Radiology ([id number])        Primary Care Provider Office Phone # Fax #    [facility] [phone] [address] [facility] [address]        Equal Access to Services     CATARINO JETT: Hadii aad ku haddonnao Soomaali, waaxda luqadaha, qaybta kaalmada adeegyada, rosa caglen macario benavidez herminio jett. So [facility] [phone].    ATENCIÓN: Si habla español, tiene a espinosa disposición servicios gratuitos de asistencia lingüística. Llame al [phone].    We comply with applicable federal civil rights laws and [place] laws. We do not discriminate on the basis of race, color, national origin, age, disability, sex, sexual orientation, or gender identity.            Thank you!     Thank you for choosing [facility]  for your care. Our goal is always to provide you with excellent care. Hearing back from our patients is one way we can continue to improve our services. Please take a few minutes to complete the written survey that you may receive in the mail after your visit with us. Thank you!             Your Updated Medication List - Protect others around you: Learn how to safely use, store and throw away your medicines at www.disposemymeds.org.          This list is accurate as of 5/3/18  1:35 PM.  Always use your most recent med list.                   Brand Name Dispense Instructions for use Diagnosis    prenatal multivitamin plus iron 27-0.8 MG Tabs per tablet      Take 1 tablet by mouth daily    Previous  delivery affecting pregnancy

## 2018-05-03 NOTE — PROGRESS NOTES
Patient presents for routine prenatal visit at 35w3d.  Patient without complaint.   PE: See OB vitals  There is no height or weight on file to calculate BMI.    Questions asked and answered.    GROUP BETA STREPTOCOCCUS Done  Transabdominal ultrasound was performed to determine presentation.  A viable intrauterine pregnancy was seen.  The fetus is noted in VERTEX .  Fetal heart motion was visualized at 145 bpm.    Normal Amniotic Fluid Volume is present.    Estimated Date of Delivery: Jun 4, 2018      Hansa Cantu MD FACOG

## 2018-05-04 LAB
GP B STREP DNA SPEC QL NAA+PROBE: NEGATIVE
SPECIMEN SOURCE: NORMAL

## 2018-05-11 ENCOUNTER — PRENATAL OFFICE VISIT (OUTPATIENT)
Dept: OBGYN | Facility: CLINIC | Age: 28
End: 2018-05-11
Payer: COMMERCIAL

## 2018-05-11 VITALS
DIASTOLIC BLOOD PRESSURE: 67 MMHG | BODY MASS INDEX: 32.23 KG/M2 | HEART RATE: 97 BPM | HEIGHT: 64 IN | TEMPERATURE: 97 F | SYSTOLIC BLOOD PRESSURE: 107 MMHG | OXYGEN SATURATION: 98 % | WEIGHT: 188.8 LBS

## 2018-05-11 DIAGNOSIS — O34.219 PREVIOUS CESAREAN DELIVERY AFFECTING PREGNANCY: Primary | ICD-10-CM

## 2018-05-11 PROCEDURE — 99207 ZZC PRENATAL VISIT: CPT | Performed by: NURSE PRACTITIONER

## 2018-05-11 ASSESSMENT — PAIN SCALES - GENERAL: PAINLEVEL: NO PAIN (0)

## 2018-05-11 NOTE — NURSING NOTE
"Chief Complaint   Patient presents with     Prenatal Care     36w4d       Initial /67  Pulse 97  Temp 97  F (36.1  C) (Oral)  Ht 5' 4.25\" (1.632 m)  Wt 188 lb 12.8 oz (85.6 kg)  LMP 09/08/2017 (Approximate)  SpO2 98%  BMI 32.16 kg/m2 Estimated body mass index is 32.16 kg/(m^2) as calculated from the following:    Height as of this encounter: 5' 4.25\" (1.632 m).    Weight as of this encounter: 188 lb 12.8 oz (85.6 kg)..  BP completed using cuff size: nikia Cassidy CMA    "

## 2018-05-11 NOTE — MR AVS SNAPSHOT
"              After Visit Summary   2018    Hansa Ta    MRN: [id number]           Patient Information     Date Of Birth          1990        Visit Information        Provider Department      2018 11:50 AM Ignacia Batista APRN CNP [facility]        Today's Diagnoses     Previous  delivery affecting pregnancy    -  1       Follow-ups after your visit        Future tests that were ordered for you today     Open Future Orders        Priority Expected Expires Ordered    US OB Single Follow Up Repeat Routine 2018            Who to contact     If you have questions or need follow up information about today's clinic visit or your schedule please contact [facility] directly at [phone].  Normal or non-critical lab and imaging results will be communicated to you by AXADOhart, letter or phone within 4 business days after the clinic has received the results. If you do not hear from us within 7 days, please contact the clinic through AXADOhart or phone. If you have a critical or abnormal lab result, we will notify you by phone as soon as possible.  Submit refill requests through [employer] or call your pharmacy and they will forward the refill request to us. Please allow 3 business days for your refill to be completed.          Additional Information About Your Visit        AXADOhart Information     [facility] lets you send messages to your doctor, view your test results, renew your prescriptions, schedule appointments and more. To sign up, go to [email or web address] . Click on \"Log in\" on the left side of the screen, which will take you to the Welcome page. Then click on \"Sign up Now\" on the right side of the page.     You will be asked to enter the access code listed below, as well as some personal information. Please follow the directions to create your username and password.     Your access code is: [email or web address]  Expires: 2018  1:35 PM   " "  Your access code will  in 90 days. If you need help or a new code, please call your [place] clinic or [phone].        Care EveryWhere ID     This is your Care EveryWhere ID. This could be used by other organizations to access your [place] medical records  [id number]        Your Vitals Were     Pulse Temperature Height Last Period Pulse Oximetry BMI (Body Mass Index)    97 97  F (36.1  C) (Oral) 5' 4.25\" (1.632 m) 2017 (Approximate) 98% 32.16 kg/m2       Blood Pressure from Last 3 Encounters:   18 107/67   18 107/74   18 114/70    Weight from Last 3 Encounters:   18 188 lb 12.8 oz (85.6 kg)   18 183 lb 3.2 oz (83.1 kg)   18 185 lb 12.8 oz (84.3 kg)               Primary Care Provider Office Phone # Fax #    [facility] [phone] [address] [facility] [address]        Equal Access to Services     CATARINO ECHEVARRIA : Hadii jeet ku hadasho Soomaali, waaxda luqadaha, qaybta kaalmada adekaila, rosa durham . So [facility] [phone].    ATENCIÓN: Si habla español, tiene a espinosa disposición servicios gratuitos de asistencia lingüística. Llame al [phone].    We comply with applicable federal civil rights laws and [place] laws. We do not discriminate on the basis of race, color, national origin, age, disability, sex, sexual orientation, or gender identity.            Thank you!     Thank you for choosing [facility]  for your care. Our goal is always to provide you with excellent care. Hearing back from our patients is one way we can continue to improve our services. Please take a few minutes to complete the written survey that you may receive in the mail after your visit with us. Thank you!             Your Updated Medication List - Protect others around you: Learn how to safely use, store and throw away your medicines at www.[employer] Public Safetyeds.org.          This list is accurate as of 18 " 12:13 PM.  Always use your most recent med list.                   Brand Name Dispense Instructions for use Diagnosis    prenatal multivitamin plus iron 27-0.8 MG Tabs per tablet      Take 1 tablet by mouth daily    Previous  delivery affecting pregnancy

## 2018-05-11 NOTE — PROGRESS NOTES
Patient presents for routine prenatal visit. Prenatal flowsheet reviewed and updated as needed.  Denies vaginal bleeding, loss of fluid, contractions or cramping.  Patient without complaint. Declines cervical exam today.  Would like to do growth ultrasound to see size of baby-not 100% sure she wants to . Ordered and will follow up at next visit.  Advice as per Anticipatory Guidance/Checklist updated.  PE: See OB vitals    Questions asked and answered. Next OB visit in 1 week(s) with Dr. Cantu.    Ignacia ASIF CNP

## 2018-05-21 ENCOUNTER — RADIANT APPOINTMENT (OUTPATIENT)
Dept: ULTRASOUND IMAGING | Facility: CLINIC | Age: 28
End: 2018-05-21
Attending: NURSE PRACTITIONER
Payer: COMMERCIAL

## 2018-05-21 DIAGNOSIS — O34.219 PREVIOUS CESAREAN DELIVERY AFFECTING PREGNANCY: ICD-10-CM

## 2018-05-21 PROCEDURE — 76816 OB US FOLLOW-UP PER FETUS: CPT

## 2018-05-23 ENCOUNTER — PRENATAL OFFICE VISIT (OUTPATIENT)
Dept: OBGYN | Facility: CLINIC | Age: 28
End: 2018-05-23
Payer: COMMERCIAL

## 2018-05-23 VITALS
HEART RATE: 100 BPM | OXYGEN SATURATION: 96 % | SYSTOLIC BLOOD PRESSURE: 106 MMHG | TEMPERATURE: 97.3 F | HEIGHT: 64 IN | DIASTOLIC BLOOD PRESSURE: 71 MMHG | BODY MASS INDEX: 32.54 KG/M2 | WEIGHT: 190.6 LBS

## 2018-05-23 DIAGNOSIS — O34.219 PREVIOUS CESAREAN DELIVERY AFFECTING PREGNANCY: Primary | ICD-10-CM

## 2018-05-23 PROCEDURE — 99207 ZZC PRENATAL VISIT: CPT | Performed by: NURSE PRACTITIONER

## 2018-05-23 ASSESSMENT — PAIN SCALES - GENERAL: PAINLEVEL: NO PAIN (0)

## 2018-05-23 NOTE — PROGRESS NOTES
Patient presents for routine prenatal visit. Prenatal flowsheet reviewed and updated as needed.  Denies vaginal bleeding, loss of fluid, contractions or cramping.  Patient without complaint. Discussed her growth ultrasound results. At this time, planning .  Advice as per Anticipatory Guidance/Checklist updated.  PE: See OB vitals    Questions asked and answered. Next OB visit in 1 week(s) with Dr. Cantu.    Ignacia ASIF CNP

## 2018-05-23 NOTE — MR AVS SNAPSHOT
"              After Visit Summary   2018    Hansa Ta    MRN: [id number]           Patient Information     Date Of Birth          1990        Visit Information        Provider Department      2018 2:10 PM Ignacia Batista APRN CNP [facility]        Today's Diagnoses     Previous  delivery affecting pregnancy    -  1       Follow-ups after your visit        Who to contact     If you have questions or need follow up information about today's clinic visit or your schedule please contact [facility] [place] directly at [phone].  Normal or non-critical lab and imaging results will be communicated to you by Comic Rockethart, letter or phone within 4 business days after the clinic has received the results. If you do not hear from us within 7 days, please contact the clinic through PASSNFLYt or phone. If you have a critical or abnormal lab result, we will notify you by phone as soon as possible.  Submit refill requests through [employer] or call your pharmacy and they will forward the refill request to us. Please allow 3 business days for your refill to be completed.          Additional Information About Your Visit        MyChart Information     [employer] lets you send messages to your doctor, view your test results, renew your prescriptions, schedule appointments and more. To sign up, go to [email or web address] . Click on \"Log in\" on the left side of the screen, which will take you to the Welcome page. Then click on \"Sign up Now\" on the right side of the page.     You will be asked to enter the access code listed below, as well as some personal information. Please follow the directions to create your username and password.     Your access code is: [email or web address]  Expires: 2018  1:35 PM     Your access code will  in 90 days. If you need help or a new code, please call your [facility] or [phone].        Care EveryWhere ID     This is your Care EveryWhere ID. This " "could be used by other organizations to access your [place] medical records  [id number]        Your Vitals Were     Pulse Temperature Height Last Period Pulse Oximetry BMI (Body Mass Index)    100 97.3  F (36.3  C) (Oral) 5' 4.25\" (1.632 m) 09/08/2017 (Approximate) 96% 32.46 kg/m2       Blood Pressure from Last 3 Encounters:   05/23/18 106/71   05/11/18 107/67   05/03/18 107/74    Weight from Last 3 Encounters:   05/23/18 190 lb 9.6 oz (86.5 kg)   05/11/18 188 lb 12.8 oz (85.6 kg)   05/03/18 183 lb 3.2 oz (83.1 kg)              Today, you had the following     No orders found for display       Primary Care Provider Office Phone # Fax #    [facility] [phone] [address] [facility] [address]        Equal Access to Services     CATARINO ECHEVARRIA : Hadii aad ku hadasho Soomaali, waaxda luqadaha, qaybta kaalmada adeegyada, waxay idiin hayмария durham . So [facility] [phone].    ATENCIÓN: Si habla español, tiene a espinosa disposición servicios gratuitos de asistencia lingüística. Llame al [phone].    We comply with applicable federal civil rights laws and [place] laws. We do not discriminate on the basis of race, color, national origin, age, disability, sex, sexual orientation, or gender identity.            Thank you!     Thank you for choosing [facility]  for your care. Our goal is always to provide you with excellent care. Hearing back from our patients is one way we can continue to improve our services. Please take a few minutes to complete the written survey that you may receive in the mail after your visit with us. Thank you!             Your Updated Medication List - Protect others around you: Learn how to safely use, store and throw away your medicines at www.disposemymeds.org.          This list is accurate as of 5/23/18  2:31 PM.  Always use your most recent med list.                   Brand Name Dispense Instructions for use Diagnosis    " prenatal multivitamin plus iron 27-0.8 MG Tabs per tablet      Take 1 tablet by mouth daily    Previous  delivery affecting pregnancy

## 2018-05-24 ENCOUNTER — TELEPHONE (OUTPATIENT)
Dept: OBGYN | Facility: CLINIC | Age: 28
End: 2018-05-24

## 2018-05-24 NOTE — TELEPHONE ENCOUNTER
Per OBV on 18 with Ignacia Batista- patient was planning on .  Return call to patient- patient states she is wanting to schedule for a C/Section- closer to her due date to give the baby a chance to come on it's own. Patient was wanting to schedule for around 18 for repeat .  Informed patient I would check with provider on scheduling this- and then call her back.  Forwarded to provider to advise.

## 2018-05-24 NOTE — TELEPHONE ENCOUNTER
Patient is calling to discuss how to contact DR posadas about scheduling C section. Please call to discuss. Thank you.

## 2018-05-25 NOTE — TELEPHONE ENCOUNTER
Zhou per Ignacia- schedule repeat  for next week.  Patient would like to see Ignacia for a cervical check on 18.  Schedule patient for an OBV and pre-op exam on 18.  Patient scheduled with Dr. Cantu on 18 (next clinic day for Dr. Cantu) morning.  Forwarded to provider as RUSTY.

## 2018-05-25 NOTE — TELEPHONE ENCOUNTER
Surgery Scheduled.    Date of Surgery 18 Time of Surgery 2:30 pm  Procedure: Repeat   Hospital/Surgical Facility: [facility]  Surgeon: Dr. Cantu  Type of Anesthesia Anticipated: Spinal  Pre-op: 18 with Ignacia Batista at [facility]  6 week post op Pt will schedule later  Pre-certification 18  Consent signed: NA  Hospital Stay 3 days       [facility] surgery packet mailed to patient's home address.  Patient instructed NPO 12 hours prior to surgery, arrive 1 1/2 hours prior to surgery, must not have a .  Patient understood and agrees to the plan.      Surgery Pre-Certification    Medical Record Number: [id number]  Hansa Ta  YOB: 1990   Phone: [phone] (home) [phone] (work)  Primary Provider: [facility]    Reason for Admit:  Previous     Surgeon: FINESSE Cantu MD  Surgical Procedure: Repeat   ICD-9 Coded: O34.21  Date of Surgery: 18  Consent signed? N/A      Hospital: [facility]  Inpatient- Length of stay:  3 days.    Requestor:  Jessie Maldonado     Location:  [facility]    Tanya Ramires CMA

## 2018-05-29 NOTE — TELEPHONE ENCOUNTER
Spoke to [place] at [facility] insurance and CPT code: 54592 no auth or pre-d needed, will need an auth if patient stay's longer then 96 hours (4 days). Benefits deductible $2250 has met $1114.35 so far, once met covered 70/30 to out of pocket $4,850 currently met $1,259.71 for the 2018 benefit year. Call ref. [id number] [place] 05/29/18 2:45pm

## 2018-05-30 ENCOUNTER — OFFICE VISIT (OUTPATIENT)
Dept: OBGYN | Facility: CLINIC | Age: 28
End: 2018-05-30
Payer: COMMERCIAL

## 2018-05-30 VITALS
BODY MASS INDEX: 32.44 KG/M2 | TEMPERATURE: 98.1 F | SYSTOLIC BLOOD PRESSURE: 105 MMHG | DIASTOLIC BLOOD PRESSURE: 72 MMHG | WEIGHT: 190 LBS | HEART RATE: 102 BPM | HEIGHT: 64 IN | OXYGEN SATURATION: 94 %

## 2018-05-30 DIAGNOSIS — Z01.818 PREOP GENERAL PHYSICAL EXAM: Primary | ICD-10-CM

## 2018-05-30 DIAGNOSIS — O34.219 PREVIOUS CESAREAN DELIVERY AFFECTING PREGNANCY: ICD-10-CM

## 2018-05-30 PROCEDURE — 99207 ZZC PRENATAL VISIT: CPT | Performed by: NURSE PRACTITIONER

## 2018-05-30 ASSESSMENT — PAIN SCALES - GENERAL: PAINLEVEL: NO PAIN (0)

## 2018-05-30 NOTE — PROGRESS NOTES
[facility]  [address] [facility] [address] [phone]  Dept: [phone]    PRE-OP EVALUATION:  Today's date: 2018    Hansa Ta (: 1990) presents for pre-operative evaluation assessment as requested by Dr. Cantu.  She requires evaluation and anesthesia risk assessment prior to undergoing surgery/procedure for treatment of Repeat C- Section .    Fax number for surgical facility: [phone]  Primary Physician: [facility]  Type of Anesthesia Anticipated: Spinal    Patient has a Health Care Directive or Living Will:  NO    Preop Questions 2018   Who is doing your surgery? Dr Cantu   What are you having done? C section   Date of Surgery/Procedure: May 31 2018   Facility or Hospital where procedure/surgery will be performed: [facility] [place]   1.  Do you have a history of Heart attack, stroke, stent, coronary bypass surgery, or other heart surgery? No   2.  Do you ever have any pain or discomfort in your chest? No   3.  Do you have a history of  Heart Failure? No   4.   Are you troubled by shortness of breath when:  walking on a level surface, or up a slight hill, or at night? No   5.  Do you currently have a cold, bronchitis or other respiratory infection? No   6.  Do you have a cough, shortness of breath, or wheezing? No   7.  Do you sometimes get pains in the calves of your legs when you walk? No   8. Do you or anyone in your family have previous history of blood clots? No   9.  Do you or does anyone in your family have a serious bleeding problem such as prolonged bleeding following surgeries or cuts? No   10. Have you ever had problems with anemia or been told to take iron pills? YES - in pregnancy   11. Have you had any abnormal blood loss such as black, tarry or bloody stools, or abnormal vaginal bleeding? No   12. Have you ever had a blood transfusion? No   13. Have you or any of your relatives ever had problems with anesthesia? No   14.  Do you have sleep apnea, excessive snoring or daytime drowsiness? No   15. Do you have any prosthetic heart valves? No   16. Do you have prosthetic joints? No   17. Is there any chance that you may be pregnant? YES - currently         HPI:     HPI related to upcoming procedure: Patient with a previous  section, currently 39 weeks gestation. Was planning , but has changed her mind and wants to proceed with repeat  section.    See problem list for active medical problems.  Problems all longstanding and stable, except as noted/documented.  See ROS for pertinent symptoms related to these conditions.                                                                                                                                                          .    MEDICAL HISTORY:     Patient Active Problem List    Diagnosis Date Noted     Previous  delivery affecting pregnancy 10/27/2017     Priority: Medium     Post partum depression 2015     Priority: Medium     Endometriosis 2014     Priority: Medium     Ovarian cyst 2014     Priority: Medium     Problem list name updated by automated process. Provider to review       CARDIOVASCULAR SCREENING; LDL GOAL LESS THAN 160 2014     Priority: Medium      Past Medical History:   Diagnosis Date     Endometriosis      Ovarian cyst      Past Surgical History:   Procedure Laterality Date     APPENDECTOMY       C/SECTION, LOW TRANSVERSE       LAPAROSCOPY DIAGNOSTIC (GYN)      4     OTHER SURGICAL HISTORY      left ovary removed, for cysts     Current Outpatient Prescriptions   Medication Sig Dispense Refill     Prenatal Vit-Fe Fumarate-FA (PRENATAL MULTIVITAMIN PLUS IRON) 27-0.8 MG TABS per tablet Take 1 tablet by mouth daily       OTC products: None, except as noted above    No Known Allergies   Latex Allergy: NO    Social History   Substance Use Topics     Smoking status: Never Smoker     Smokeless tobacco: Never Used     Alcohol use No      History   Drug Use No       REVIEW OF SYSTEMS:   CONSTITUTIONAL: NEGATIVE for fever, chills  INTEGUMENTARY/SKIN: NEGATIVE for worrisome rashes, moles or lesions  EYES: NEGATIVE for vision changes or irritation  ENT/MOUTH: NEGATIVE for ear, mouth and throat problems  RESP: NEGATIVE for significant cough or SOB  BREAST: NEGATIVE for masses, tenderness or discharge  CV: NEGATIVE for chest pain, palpitations or peripheral edema  GI: NEGATIVE for nausea, abdominal pain, heartburn, or change in bowel habits  : NEGATIVE for frequency, dysuria, or hematuria  MUSCULOSKELETAL: NEGATIVE for significant arthralgias or myalgia  NEURO: NEGATIVE for weakness, dizziness or paresthesias  ENDOCRINE: NEGATIVE for temperature intolerance, skin/hair changes  HEME: NEGATIVE for bleeding problems  PSYCHIATRIC: NEGATIVE for changes in mood or affect    EXAM:   LMP 09/08/2017 (Approximate)    GENERAL APPEARANCE: healthy, alert and no distress     EYES: EOMI, PERRL     HENT: ear canals and TM's normal and nose and mouth without ulcers or lesions     NECK: no adenopathy, no asymmetry, masses, or scars and thyroid normal to palpation     RESP: lungs clear to auscultation - no rales, rhonchi or wheezes     CV: regular rates and rhythm, normal S1 S2, no S3 or S4 and no murmur, click or rub     ABDOMEN:  soft, nontender     MS: extremities normal- no gross deformities noted, no evidence of inflammation in joints, FROM in all extremities.     SKIN: no suspicious lesions or rashes     NEURO: Normal strength and tone, sensory exam grossly normal, mentation intact and speech normal     PSYCH: mentation appears normal. and affect normal/bright     LYMPHATICS: No cervical adenopathy    DIAGNOSTICS:   No labs or EKG required for low risk surgery (cataract, skin procedure, breast biopsy, etc)    Recent Labs   Lab Test  02/23/18   1509  10/27/17   1530  04/15/15   1151   HGB  11.5*  13.7  10.9*   PLT   --   184  166        IMPRESSION:   Reason for  surgery/procedure: Previous  section  Diagnosis/reason for consult: Pre-operative clearance    The proposed surgical procedure is considered INTERMEDIATE risk.    REVISED CARDIAC RISK INDEX  The patient has the following serious cardiovascular risks for perioperative complications such as (MI, PE, VFib and 3  AV Block):  No serious cardiac risks  INTERPRETATION: 0 risks: Class I (very low risk - 0.4% complication rate)    The patient has the following additional risks for perioperative complications:  No identified additional risks      ICD-10-CM    1. Preop general physical exam Z01.818        RECOMMENDATIONS:   APPROVAL GIVEN to proceed with proposed procedure, without further diagnostic evaluation       Signed Electronically by: LAYA Mathews CNP    Copy of this evaluation report is provided to requesting physician.    [facility] Preop Guidelines    Revised Cardiac Risk Index  I have reviewed this encounter and agree.  Pete Cantu MD FACOG

## 2018-05-30 NOTE — NURSING NOTE
"Chief Complaint   Patient presents with     Pre-Op Exam       Initial /72  Pulse 102  Temp 98.1  F (36.7  C) (Oral)  Ht 5' 4.25\" (1.632 m)  Wt 190 lb (86.2 kg)  LMP 09/08/2017 (Approximate)  SpO2 94%  BMI 32.36 kg/m2 Estimated body mass index is 32.36 kg/(m^2) as calculated from the following:    Height as of this encounter: 5' 4.25\" (1.632 m).    Weight as of this encounter: 190 lb (86.2 kg)..  BP completed using cuff size: nikia Cassidy CMA    "

## 2018-05-30 NOTE — Clinical Note
Please cosign pre-op and [place] [facility] [place]

## 2018-05-30 NOTE — MR AVS SNAPSHOT
After Visit Summary   2018    Hansa Ta    MRN: [id number]           Patient Information     Date Of Birth          1990        Visit Information        Provider Department      2018 1:50 PM Ignacia Batista APRN CNP [facility]        Today's Diagnoses     Preop general physical exam    -  1    Previous  delivery affecting pregnancy          Care Instructions      Before Your Surgery      Call your surgeon if there is any change in your health. This includes signs of a cold or flu (such as a sore throat, runny nose, cough, rash or fever).    Do not smoke, drink alcohol or take over the counter medicine (unless your surgeon or primary care doctor tells you to) for the 24 hours before and after surgery.    If you take prescribed drugs: Follow your doctor s orders about which medicines to take and which to stop until after surgery.    Eating and drinking prior to surgery: follow the instructions from your surgeon    Take a shower or bath the night before surgery. Use the soap your surgeon gave you to gently clean your skin. If you do not have soap from your surgeon, use your regular soap. Do not shave or scrub the surgery site.  Wear clean pajamas and have clean sheets on your bed.           Follow-ups after your visit        Who to contact     If you have questions or need follow up information about today's clinic visit or your schedule please contact [facility] directly at [phone].  Normal or non-critical lab and imaging results will be communicated to you by MyChart, letter or phone within 4 business days after the clinic has received the results. If you do not hear from us within 7 days, please contact the clinic through MyChart or phone. If you have a critical or abnormal lab result, we will notify you by phone as soon as possible.  Submit refill requests through [employer]ovo or call your pharmacy and they will forward the refill request to  "us. Please allow 3 business days for your refill to be completed.          Additional Information About Your Visit        MyChart Information     [employer] lets you send messages to your doctor, view your test results, renew your prescriptions, schedule appointments and more. To sign up, go to [email or web address] . Click on \"Log in\" on the left side of the screen, which will take you to the Welcome page. Then click on \"Sign up Now\" on the right side of the page.     You will be asked to enter the access code listed below, as well as some personal information. Please follow the directions to create your username and password.     Your access code is: [email or web address]  Expires: 2018  1:35 PM     Your access code will  in 90 days. If you need help or a new code, please call your [place] clinic or [phone].        Care EveryWhere ID     This is your [facility] EveryWhere ID. This could be used by other organizations to access your [place] medical records  [id number]        Your Vitals Were     Pulse Temperature Height Last Period Pulse Oximetry BMI (Body Mass Index)    102 98.1  F (36.7  C) (Oral) 5' 4.25\" (1.632 m) 2017 (Approximate) 94% 32.36 kg/m2       Blood Pressure from Last 3 Encounters:   18 105/72   18 106/71   18 107/67    Weight from Last 3 Encounters:   18 190 lb (86.2 kg)   18 190 lb 9.6 oz (86.5 kg)   18 188 lb 12.8 oz (85.6 kg)              Today, you had the following     No orders found for display       Primary Care Provider Office Phone # Fax #    [facility] [phone] [address] [facility] [address]        Equal Access to Services     CATARINO ECHEVARRIA : Gisel Heaton, manuel delgado, qarosa apodaca. [facility] [phone].    ATENCIÓN: Si habla español, tiene a espinosa disposición servicios gratuitos de asistencia lingüística. Llame al " [phone].    We comply with applicable federal civil rights laws and [place] laws. We do not discriminate on the basis of race, color, national origin, age, disability, sex, sexual orientation, or gender identity.            Thank you!     Thank you for choosing [facility]  [facility] your [facility]. Our goal is always to provide you with excellent care. Hearing back from our patients is one way we can continue to improve our services. Please take a few minutes to complete the written survey that you may receive in the mail after your visit with us. Thank you!             Your Updated Medication List - Protect others around you: Learn how to safely use, store and throw away your medicines at www.disposemymeds.org.          This list is accurate as of 18  2:17 PM.  Always use your most recent med list.                   Brand Name Dispense Instructions for use Diagnosis    prenatal multivitamin plus iron 27-0.8 MG Tabs per tablet      Take 1 tablet by mouth daily    Previous  delivery affecting pregnancy

## 2018-05-30 NOTE — PROGRESS NOTES
Patient presents for routine prenatal visit. Prenatal flowsheet reviewed and updated as needed.  Denies vaginal bleeding, loss of fluid, contractions or cramping.  Patient without complaint. Having some anxiety about having surgical delivery. We discussed her concerns at length. Scheduled for  section tomorrow.  Advice as per Anticipatory Guidance/Checklist updated.  PE: See OB vitals  FH: 39 cm  FHTs: 138 bpm    Questions asked and answered.     Ignacia ASIF CNP

## 2018-06-07 ENCOUNTER — TELEPHONE (OUTPATIENT)
Dept: PEDIATRICS | Facility: CLINIC | Age: 28
End: 2018-06-07

## 2018-06-07 ENCOUNTER — OFFICE VISIT (OUTPATIENT)
Dept: BEHAVIORAL HEALTH | Facility: CLINIC | Age: 28
End: 2018-06-07
Payer: COMMERCIAL

## 2018-06-07 PROCEDURE — 99207 ZZC NO CHARGE BEHAVIORAL WARM HANDOFF: CPT | Performed by: MARRIAGE & FAMILY THERAPIST

## 2018-06-07 NOTE — MR AVS SNAPSHOT
"              After Visit Summary   6/7/2018    Hansa Ta    MRN: [id number]           Patient Information     Date Of Birth          1990        Visit Information        Provider Department      6/7/2018 2:30 PM Pao Hutchinson [facility]        Today's Diagnoses     Post partum depression    -  1       Follow-ups after your visit        Your next 10 appointments already scheduled     Jun 14, 2018  1:30 PM CDT   [facility] Visit with [facility]o [facility]   [facility]    [address] [facility] [address] [phone]              Who to contact     If you have questions or need follow up information about today's clinic visit or your schedule please contact [facility] directly at [phone].  Normal or non-critical lab and imaging results will be communicated to you by MyChart, letter or phone within 4 business days after the clinic has received the results. If you do not hear from us within 7 days, please contact the clinic through MyChart or phone. If you have a critical or abnormal lab result, we will notify you by phone as soon as possible.  Submit refill requests through [employer] or call your pharmacy and they will forward the refill request to us. Please allow 3 business days for your refill to be completed.          Additional Information About Your Visit        MyChart Information     [employer] lets you send messages to your doctor, view your test results, renew your prescriptions, schedule appointments and more. To sign up, go to [email or web address] . Click on \"Log in\" on the left side of the screen, which will take you to the Welcome page. Then click on \"Sign up Now\" on the right side of the page.     You will be asked to enter the access code listed below, as well as some personal information. Please follow the directions to create your username and password.     Your access code is: [email or web address]  Expires: " 2018  1:35 PM     Your access code will  in 90 days. If you need help or a new code, please call your [place] clinic or [phone].        Care EveryWhere ID     This is your Care EveryWhere ID. This could be used by other organizations to access your [place] medical records  [id number]        Your Vitals Were     Last Period                   2017 (Approximate)            Blood Pressure from Last 3 Encounters:   18 105/72   18 106/71   18 107/67    Weight from Last 3 Encounters:   18 86.2 kg (190 lb)   18 86.5 kg (190 lb 9.6 oz)   18 85.6 kg (188 lb 12.8 oz)              Today, you had the following     No orders found for display       Primary Care Provider Office Phone # Fax #    [facility] [phone] [address] [facility] [address]        Equal Access to Services     CATARINO ECHEVARRIA : Hadii aad ku hadasho Soomaali, waaxda luqadaha, qaybta kaalmada adeegyada, waxay idiin hayaan katheryneg pramod durham . So [facility] [phone].    ATENCIÓN: Si habla español, tiene a espinosa disposición servicios gratuitos de asistencia lingüística. Llame al [phone].    We comply with applicable federal civil rights laws and [place] laws. We do not discriminate on the basis of race, color, national origin, age, disability, sex, sexual orientation, or gender identity.            Thank you!     Thank you for choosing [facility]  for your care. Our goal is always to provide you with excellent care. Hearing back from our patients is one way we can continue to improve our services. Please take a few minutes to complete the written survey that you may receive in the mail after your visit with us. Thank you!             Your Updated Medication List - Protect others around you: Learn how to safely use, store and throw away your medicines at www.disposemymeds.org.          This list is accurate as of 18  2:32 PM.  Always use your  most recent med list.                   Brand Name Dispense Instructions for use Diagnosis    prenatal multivitamin plus iron 27-0.8 MG Tabs per tablet      Take 1 tablet by mouth daily    Previous  delivery affecting pregnancy

## 2018-06-07 NOTE — PROGRESS NOTES
Warm-handoff    BEHAVIORAL HEALTH CLINICIAN introduced and explained integrated health model, brief therapy interventions and referral and support services for ongoing therapy interventions.   Patient experiencing post partum depression. Patient has no safety risk; no SI or thoughts of harming or neglecting baby. Patient has 3 year old and . Patient is partnered. Patient scheduled [facility] therapy appointment 18

## 2018-06-08 NOTE — TELEPHONE ENCOUNTER
Noted Behavioral Health Notes from yesterday and future appt.  Noted Dr. Cantu is not in clinic today.  Will route to LAYA Hooker, CNP for review. Judy Oconnor RN, BAN

## 2018-06-08 NOTE — TELEPHONE ENCOUNTER
"EPDS was done in clinic today during child's 1 week visit and score was 17 with negative psychosis and negative screen for suicidal ideation.   Mom has history of postpartum depression with previous pregnancy.   Copy of EPDS was given to Quinton today as well as educational material about postpartum depression.     Plan:   Evaluated by [facility] today. and Has appointment with [facility] on 6/14/18  Behavioral health referral was placed urgent.   Last time had severe post partum depression and this time not as bad as before she does suffer from anxiety but was a manageable anxiety without meds.  With there 3 years she moved in with her parents and tried meds but this did not help.  From when her daughter was 3 months to 1 year old she lived at grandparents Tuesday through Friday and then the parents had her at home on the weekends.    She thinks she tried Wellbutrin  It never is that she would hurt baby or herself just feeling like a \"bad mom as I cannot help him with the kids I can't get on the floor and play with my daughter.    LAYA Carlin, CNP    TC,    please forward to Dr. Cantu' pool.    Thanks,    LAYA Carlin, CNP      "

## 2018-06-08 NOTE — TELEPHONE ENCOUNTER
Patient seeing Behavioral Health next week. Can we try reaching out to her to see if she would like to be seen by OB dept prior to then? If she prefers to wait for appointment on Thursday, that is fine so long as she does not develop any thoughts/plans of harm to self, baby or others. If she were to develop these thoughts or plans, would need to be seen in ED. Ignacia ASIF CNP

## 2018-06-08 NOTE — TELEPHONE ENCOUNTER
Phone call to patient. Offered her an opportunity to have appt with LAYA Hooker CNP prior to her appt with  on 06-14-18. Patient verified that it would be seeing LAYA Hooker CNP in addition to JAMEEL. Patient agreed and scheduled per patient's request with LAYA Hooker CNP on 06-11-18 at 1250. Patient contracted for safety and again stated she does not thoughts/plans to harm herself, baby or others. Patient appreciative of the follow up phone call.  Will route to LAYA Hooker CNP as an FYI. Judy Oconnor RN, BAN

## 2018-06-11 ENCOUNTER — OFFICE VISIT (OUTPATIENT)
Dept: OBGYN | Facility: CLINIC | Age: 28
End: 2018-06-11
Payer: COMMERCIAL

## 2018-06-11 VITALS
DIASTOLIC BLOOD PRESSURE: 76 MMHG | WEIGHT: 177.6 LBS | HEART RATE: 66 BPM | BODY MASS INDEX: 30.32 KG/M2 | OXYGEN SATURATION: 100 % | SYSTOLIC BLOOD PRESSURE: 108 MMHG | HEIGHT: 64 IN | TEMPERATURE: 97.4 F

## 2018-06-11 DIAGNOSIS — F41.1 GAD (GENERALIZED ANXIETY DISORDER): ICD-10-CM

## 2018-06-11 PROCEDURE — 99213 OFFICE O/P EST LOW 20 MIN: CPT | Performed by: NURSE PRACTITIONER

## 2018-06-11 RX ORDER — ESCITALOPRAM OXALATE 20 MG/1
TABLET ORAL
Qty: 30 TABLET | Refills: 1 | Status: SHIPPED | OUTPATIENT
Start: 2018-06-11 | End: 2018-07-09

## 2018-06-11 ASSESSMENT — PATIENT HEALTH QUESTIONNAIRE - PHQ9
5. POOR APPETITE OR OVEREATING: MORE THAN HALF THE DAYS
SUM OF ALL RESPONSES TO PHQ QUESTIONS 1-9: 20
10. IF YOU CHECKED OFF ANY PROBLEMS, HOW DIFFICULT HAVE THESE PROBLEMS MADE IT FOR YOU TO DO YOUR WORK, TAKE CARE OF THINGS AT HOME, OR GET ALONG WITH OTHER PEOPLE: EXTREMELY DIFFICULT
SUM OF ALL RESPONSES TO PHQ QUESTIONS 1-9: 20

## 2018-06-11 ASSESSMENT — ANXIETY QUESTIONNAIRES
6. BECOMING EASILY ANNOYED OR IRRITABLE: MORE THAN HALF THE DAYS
GAD7 TOTAL SCORE: 17
2. NOT BEING ABLE TO STOP OR CONTROL WORRYING: NEARLY EVERY DAY
1. FEELING NERVOUS, ANXIOUS, OR ON EDGE: NEARLY EVERY DAY
3. WORRYING TOO MUCH ABOUT DIFFERENT THINGS: NEARLY EVERY DAY
IF YOU CHECKED OFF ANY PROBLEMS ON THIS QUESTIONNAIRE, HOW DIFFICULT HAVE THESE PROBLEMS MADE IT FOR YOU TO DO YOUR WORK, TAKE CARE OF THINGS AT HOME, OR GET ALONG WITH OTHER PEOPLE: VERY DIFFICULT
7. FEELING AFRAID AS IF SOMETHING AWFUL MIGHT HAPPEN: MORE THAN HALF THE DAYS
5. BEING SO RESTLESS THAT IT IS HARD TO SIT STILL: MORE THAN HALF THE DAYS

## 2018-06-11 ASSESSMENT — PAIN SCALES - GENERAL: PAINLEVEL: MODERATE PAIN (4)

## 2018-06-11 NOTE — PROGRESS NOTES
SUBJECTIVE:   Hansa Ta is a 28 year old female who presents to clinic today for the following health issues:      Abnormal Mood Symptoms  Onset: 2018    Description:   Depression: YES  Anxiety: YES    Accompanying Signs & Symptoms:  Still participating in activities that you used to enjoy: no  Fatigue: YES  Irritability: YES  Difficulty concentrating: no  Changes in appetite: YES  Problems with sleep: YES  Heart racing/beating fast : YES  Thoughts of hurting yourself or others: none    History:   Recent stress: YES  Prior depression hospitalization: None  Family history of depression: YES  Family history of anxiety: no    Precipitating factors:   Alcohol/drug use: no    Alleviating factors:  none    Therapies Tried and outcome: Ativan (Lorezepam)    Patient had a repeat  section on 18. Per patient, delivery and care in hospital were better than expected. Nearly immediately after getting home, her depression symptoms began to start. Had severe PP depression after her first pregnancy. For a number of months, her parents would keep the baby for a few days each week because patient was not able to care for daughter. Patient is not at that point, but is fearful she may head in that direction. Last week her older child was diagnosed with pneumonia and then her  had emergency surgery for a nearly ruptured appendix. Both of those have been a significant stress on her and there family. She and her  are now both recovering from surgical procedures.   Patient was in with the baby and scored high on the depression screening.  counselor briefly stepping in to meet the patient and she is scheduled to see  counselor later this week. Patient is hesitant about keeping appointment, does not feel comfortable talking about these symptoms with someone she does not know. After last pregnancy, patient was started on Zoloft and does not feel it helped much.    Feels she is bonding better with the  "baby than last time around. Her parents have been able to help her and her  recover and are helping with their older daughter. Patient feels her anxiety is more the concern now too. Worrying about finances as they are both out on leave now. Worries she is not spending enough time with her three year old, depressed moods, sleep difficulties, poor appetite, denies thoughts of harm to self or others. Is willing to start medication at this time.    Problem list and histories reviewed & adjusted, as indicated.  Additional history: as documented    Patient Active Problem List   Diagnosis     Endometriosis     Ovarian cyst     CARDIOVASCULAR SCREENING; LDL GOAL LESS THAN 160     Post partum depression     Previous  delivery affecting pregnancy     Past Surgical History:   Procedure Laterality Date     APPENDECTOMY       C/SECTION, LOW TRANSVERSE       LAPAROSCOPY DIAGNOSTIC (GYN)      4     OTHER SURGICAL HISTORY      left ovary removed, for cysts       Social History   Substance Use Topics     Smoking status: Never Smoker     Smokeless tobacco: Never Used     Alcohol use No     Family History   Problem Relation Age of Onset     Spine Problems Mother      Lung Cancer Father            Reviewed and updated as needed this visit by clinical staff  Tobacco  Allergies  Meds  Problems  Med Hx  Surg Hx  Fam Hx  Soc Hx        Reviewed and updated as needed this visit by Provider  Tobacco  Allergies  Meds  Problems  Med Hx  Surg Hx  Fam Hx  Soc Hx          ROS:  Constitutional, HEENT, cardiovascular, pulmonary, gi and gu systems are negative, except as otherwise noted.    OBJECTIVE:     /76  Pulse 66  Temp 97.4  F (36.3  C) (Tympanic)  Ht 5' 4.25\" (1.632 m)  Wt 177 lb 9.6 oz (80.6 kg)  LMP 2017 (Approximate)  SpO2 100%  Breastfeeding? No  BMI 30.25 kg/m2  Body mass index is 30.25 kg/(m^2).  GENERAL: healthy, alert and no distress  RESP: lungs clear to auscultation - no rales, rhonchi " or wheezes  CV: regular rate and rhythm, normal S1 S2, no S3 or S4, no murmur, click or rub, no peripheral edema and peripheral pulses strong  ABDOMEN: soft, nontender, no hepatosplenomegaly, no masses and bowel sounds normal  MS: no gross musculoskeletal defects noted, no edema  SKIN: no suspicious lesions or rashes  PSYCH: mentation appears normal, affect flat and tearful  PHQ-9: 20  SVEN-7: 17    ASSESSMENT/PLAN:   1. Post partum depression  We had a lengthy discussion regarding her symptoms, history BH counseling. At this time, she agrees to keep her appointment this week and see how the session goes, then decide if she wants to continue appointments. We discussed medication use. Not breast feeding. As the anxiety is the bigger concern for her, discussed starting Lexapro. Discussed titrating dose, possible side effects, length of time before she may feel improvement. Encouraged to lean on family for support at this time. To ER immediately with thoughts of harm to self or other and she verbalizes understanding.  Patient to call or return to clinic in 7-10 days for follow up. To call sooner if symptoms worsen. Patient is given an opportunity to ask questions and have them answered.  - escitalopram (LEXAPRO) 20 MG tablet; Take 1/2 tablet (10 mg) for 1-2 weeks, then increase to 1 tablet orally daily  Dispense: 30 tablet; Refill: 1    2. SVEN (generalized anxiety disorder)  See above  - escitalopram (LEXAPRO) 20 MG tablet; Take 1/2 tablet (10 mg) for 1-2 weeks, then increase to 1 tablet orally daily  Dispense: 30 tablet; Refill: 1    LAYA Mathews [facility] [place]  Answers for HPI/ROS submitted by the patient on 6/11/2018   If you checked off any problems, how difficult have these problems made it for you to do your work, take care of things at home, or get along with other people?: Extremely difficult  PHQ9 TOTAL SCORE: 20

## 2018-06-11 NOTE — NURSING NOTE
"Chief Complaint   Patient presents with     Depression     Post partum       Initial /76  Pulse 66  Temp 97.4  F (36.3  C) (Tympanic)  Ht 5' 4.25\" (1.632 m)  Wt 177 lb 9.6 oz (80.6 kg)  LMP 09/08/2017 (Approximate)  SpO2 100%  Breastfeeding? No  BMI 30.25 kg/m2 Estimated body mass index is 30.25 kg/(m^2) as calculated from the following:    Height as of this encounter: 5' 4.25\" (1.632 m).    Weight as of this encounter: 177 lb 9.6 oz (80.6 kg)..  BP completed using cuff size: nikia Cassidy CMA    "

## 2018-06-11 NOTE — MR AVS SNAPSHOT
"              After Visit Summary   6/11/2018    Hansa Ta    MRN: [id number]           Patient Information     Date Of Birth          1990        Visit Information        Provider Department      6/11/2018 12:50 PM Ignacia Batista APRN CNP [facility]        Today's Diagnoses     Post partum depression    -  1    SVEN (generalized anxiety disorder)           Follow-ups after your visit        Your next 10 appointments already scheduled     Jun 14, 2018  1:30 PM CDT   New Visit with Pao Hutchinson   [facility] ([facility])    [address] [address] [phone]              Who to contact     If you have questions or need follow up information about today's clinic visit or your schedule please contact [facility] directly at [phone].  Normal or non-critical lab and imaging results will be communicated to you by MyChart, letter or phone within 4 business days after the clinic has received the results. If you do not hear from us within 7 days, please contact the clinic through MyChart or phone. If you have a critical or abnormal lab result, we will notify you by phone as soon as possible.  Submit refill requests through [employer] or call your pharmacy and they will forward the refill request to us. Please allow 3 business days for your refill to be completed.          Additional Information About Your Visit        Care EveryWhere ID     This is your Care EveryWhere ID. This could be used by other organizations to access your [place] medical records  [id number]        Your Vitals Were     Pulse Temperature Height Last Period Pulse Oximetry Breastfeeding?    66 97.4  F (36.3  C) (Tympanic) 5' 4.25\" (1.632 m) 09/08/2017 (Approximate) 100% No    BMI (Body Mass Index)                   30.25 kg/m2            Blood Pressure from Last 3 Encounters:   06/11/18 108/76   05/30/18 105/72   05/23/18 106/71    Weight from Last " 3 Encounters:   06/11/18 177 lb 9.6 oz (80.6 kg)   05/30/18 190 lb (86.2 kg)   05/23/18 190 lb 9.6 oz (86.5 kg)              Today, you had the following     No orders found for display         Today's Medication Changes          These changes are accurate as of 6/11/18 11:59 PM.  If you have any questions, ask your nurse or doctor.               Start taking these medicines.        Dose/Directions    escitalopram 20 MG tablet   Commonly known as:  LEXAPRO   Used for:  Post partum depression, SVEN (generalized anxiety disorder)   Started by:  Ignacia Batista APRN CNP        Take 1/2 tablet (10 mg) for 1-2 weeks, then increase to 1 tablet orally daily   Quantity:  30 tablet   Refills:  1            Where to get your medicines      These medications were sent to [facility] [address] [place], [place] - [address]  [address] [facility] [address]     Phone:  [phone]     escitalopram 20 MG tablet                Primary Care Provider Office Phone # Fax #    [facility] [phone] [address] [address] [address]        Equal Access to Services     CATARINO ECHEVARRIA AH: Hadii jeet ku hadasho Soomaali, waaxda luqadaha, qaybta kaalmada adeegyada, rosa jett. So [facility] [phone].    ATENCIÓN: Si habla español, tiene a espinosa disposición servicios gratuitos de asistencia lingüística. [facility] [phone].    We comply with applicable federal civil rights laws and [place] laws. We do not discriminate on the basis of race, color, national origin, age, disability, sex, sexual orientation, or gender identity.            Thank you!     Thank you for choosing [facility]  for your care. Our goal is always to provide you with excellent care. Hearing back from our patients is one way we can continue to improve our services. Please take a few minutes to complete the written survey that you may receive in the mail after your visit with  us. Thank you!             Your Updated Medication List - Protect others around you: Learn how to safely use, store and throw away your medicines at www.disposemymeds.org.          This list is accurate as of 18 11:59 PM.  Always use your most recent med list.                   Brand Name Dispense Instructions for use Diagnosis    escitalopram 20 MG tablet    LEXAPRO    30 tablet    Take 1/2 tablet (10 mg) for 1-2 weeks, then increase to 1 tablet orally daily    Post partum depression, SVEN (generalized anxiety disorder)       prenatal multivitamin plus iron 27-0.8 MG Tabs per tablet      Take 1 tablet by mouth daily    Previous  delivery affecting pregnancy

## 2018-06-12 ASSESSMENT — ANXIETY QUESTIONNAIRES: GAD7 TOTAL SCORE: 17

## 2018-06-12 ASSESSMENT — PATIENT HEALTH QUESTIONNAIRE - PHQ9: SUM OF ALL RESPONSES TO PHQ QUESTIONS 1-9: 20

## 2018-07-09 ENCOUNTER — PRENATAL OFFICE VISIT (OUTPATIENT)
Dept: OBGYN | Facility: CLINIC | Age: 28
End: 2018-07-09
Payer: COMMERCIAL

## 2018-07-09 VITALS
HEIGHT: 64 IN | TEMPERATURE: 97.3 F | OXYGEN SATURATION: 96 % | WEIGHT: 170.8 LBS | SYSTOLIC BLOOD PRESSURE: 106 MMHG | HEART RATE: 77 BPM | BODY MASS INDEX: 29.16 KG/M2 | DIASTOLIC BLOOD PRESSURE: 67 MMHG

## 2018-07-09 DIAGNOSIS — F41.1 GAD (GENERALIZED ANXIETY DISORDER): ICD-10-CM

## 2018-07-09 DIAGNOSIS — L29.2 VULVAR PRURITUS: ICD-10-CM

## 2018-07-09 LAB
SPECIMEN SOURCE: NORMAL
WET PREP SPEC: NORMAL

## 2018-07-09 PROCEDURE — 99207 ZZC POST PARTUM EXAM: CPT | Performed by: NURSE PRACTITIONER

## 2018-07-09 PROCEDURE — G0145 SCR C/V CYTO,THINLAYER,RESCR: HCPCS | Performed by: NURSE PRACTITIONER

## 2018-07-09 PROCEDURE — 87210 SMEAR WET MOUNT SALINE/INK: CPT | Performed by: NURSE PRACTITIONER

## 2018-07-09 RX ORDER — ESCITALOPRAM OXALATE 20 MG/1
20 TABLET ORAL DAILY
Qty: 90 TABLET | Refills: 1 | Status: SHIPPED | OUTPATIENT
Start: 2018-07-09 | End: 2019-08-06

## 2018-07-09 ASSESSMENT — ANXIETY QUESTIONNAIRES
GAD7 TOTAL SCORE: 13
6. BECOMING EASILY ANNOYED OR IRRITABLE: MORE THAN HALF THE DAYS
3. WORRYING TOO MUCH ABOUT DIFFERENT THINGS: MORE THAN HALF THE DAYS
IF YOU CHECKED OFF ANY PROBLEMS ON THIS QUESTIONNAIRE, HOW DIFFICULT HAVE THESE PROBLEMS MADE IT FOR YOU TO DO YOUR WORK, TAKE CARE OF THINGS AT HOME, OR GET ALONG WITH OTHER PEOPLE: SOMEWHAT DIFFICULT
5. BEING SO RESTLESS THAT IT IS HARD TO SIT STILL: SEVERAL DAYS
2. NOT BEING ABLE TO STOP OR CONTROL WORRYING: MORE THAN HALF THE DAYS
1. FEELING NERVOUS, ANXIOUS, OR ON EDGE: MORE THAN HALF THE DAYS
7. FEELING AFRAID AS IF SOMETHING AWFUL MIGHT HAPPEN: NEARLY EVERY DAY

## 2018-07-09 ASSESSMENT — PAIN SCALES - GENERAL: PAINLEVEL: NO PAIN (0)

## 2018-07-09 ASSESSMENT — PATIENT HEALTH QUESTIONNAIRE - PHQ9: 5. POOR APPETITE OR OVEREATING: SEVERAL DAYS

## 2018-07-09 NOTE — NURSING NOTE
"Chief Complaint   Patient presents with     Post Partum Exam       Initial /67  Pulse 77  Temp 97.3  F (36.3  C) (Oral)  Ht 5' 4.25\" (1.632 m)  Wt 170 lb 12.8 oz (77.5 kg)  LMP 09/08/2017 (Approximate)  SpO2 96%  Breastfeeding? No  BMI 29.09 kg/m2 Estimated body mass index is 29.09 kg/(m^2) as calculated from the following:    Height as of this encounter: 5' 4.25\" (1.632 m).    Weight as of this encounter: 170 lb 12.8 oz (77.5 kg)..  BP completed using cuff size: nikia Cassidy CMA    "

## 2018-07-09 NOTE — LETTER
[facility]  [address] [address]  [phone]        RE: Hansa Ta  : 1990    DATE: 2018      To Whom It May Concern,        Hansa Ta was seen in the clinic today for postpartum follow up. Due to post delivery complications, we recommend she return to work for 6 hour shifts. We will be following up again in 4-6 weeks to determine her ability to resume normal length shifts.      Thank you.      Sincerely,            Ignacia ASIF CNP

## 2018-07-09 NOTE — PROGRESS NOTES
Hansa is here for a postpartum checkup.    She had a repeat  section.  Obstetric History       T2      L2     SAB0   TAB0   Ectopic0   Multiple0   Live Births2       # Outcome Date GA Lbr Humberto/2nd Weight Sex Delivery Anes PTL Lv   2 Term 18 39w3d  8 lb 4 oz (3.742 kg) M CS-LTranv   TAYA   1 Term 08/14/15 41w0d  9 lb 8 oz (4.309 kg) F CS-LTranv Spinal N TAYA      Complications: Fetal Intolerance      Apgar1:  2                Apgar5: 9         Since delivery, she has not been breast feeding.  She has had a normal menses-began 3 days ago.  She has had intercourse. Screening has also been completed for intimate partner violence.   Patient was seen approximately 1 month ago with concerns of postpartum anxiety. Felt depression was not a concern, but it was the anxiety that was causing her concern. Was on medication after her first delivery, but did not find it helpful. Also feels that was more depression than anxiety. Was started on Lexapro and feels this has started to make a difference. Feels the anxiety is under better control, is enjoying her baby more this time around. No adverse side effects at this time.    Is having SI joint pain. This started towards the end of pregnancy. Likely going to start seeing a chiropractor again as she found this helpful in the past. Would consider physical therapy if chiropractor not helpful, but is supposed to return to work next week and not sure she can do 10 hour shifts. Questions letter for 6 hour shifts for the first month while she works with chiropractor.   She would like to discuss intermittent vaginal odor. Had BV in pregnancy. No other symptoms, but would like it checked.      O: This is a well appearing female in no acute distress. Answers questions and maintains eye contact appropriately. Vital signs noted.  RESPIRATORY: Clear to auscultation bilaterally.  CV: Regular rate and rhythm without murmur, gallop, rub  ABDOMEN: Soft, nontender,  nondistended, normoactive bowel sounds. No hepatosplenomegaly. No guarding, rebounding, or rigidity.  Incision: intact, no erythema, induration or discharge  Vulva: No external lesions, normal hair distribution, no adenopathy  BUS:  Normal, no masses noted  Vagina: Moist, pink, no abnormal discharge, well rugated, no lesions  Cervix: Pink, nulliparous, midline. Without cervical motion tenderness.  Uterus: Normal size and shape, non-tender, mobile  Ovaries: No masses, non-tender, mobile  Wet prep negative    A/P  Routine Postpartum     - I discussed the new pap recommendations regarding screening.  Explained the rationale for increased intervals between paps.  Questions asked and answered.  She does agree to this regiment.   - Pap was performed   - Contraception:  looking into vasectomy   - Discussed her PHQ-9 and SVEN. Feeling symptoms improving at this time. No adverse side effects with medication, bonding well with baby. Refill sent for current dose. Patient will plan follow up in a few months as long as she is doing well, follow up sooner if symptoms worsen.   - Letter provided for work for 6 hour shifts. Re-evaluate ability to work full shifts after 4-6 weeks.     Ignacia ASIF CNP

## 2018-07-09 NOTE — LETTER
July 12, 2018      Hansa Ta  [address] [address]    Dear ,      I am happy to inform you that your recent cervical cancer screening test (PAP smear) was normal.      Preventative screenings such as this help to ensure your health for years to come. You should repeat a pap smear in 3 years, unless otherwise directed.      You will still need to return to the clinic every year for your annual exam and other preventive tests.     Please contact the clinic at [phone] if you have further questions.       Sincerely,      LAYA Mathews CNP/rlm

## 2018-07-09 NOTE — MR AVS SNAPSHOT
"              After Visit Summary   7/9/2018    Hansa Ta    MRN: [id number]           Patient Information     Date Of Birth          1990        Visit Information        Provider Department      7/9/2018 1:30 PM Ignacia Batista APRN CNP [facility]        Today's Diagnoses     Routine postpartum follow-up    -  1    Vulvar pruritus        Post partum depression        SVEN (generalized anxiety disorder)           Follow-ups after your visit        Who to contact     If you have questions or need follow up information about today's clinic visit or your schedule please contact [facility] directly at [phone].  Normal or non-critical lab and imaging results will be communicated to you by MyChart, letter or phone within 4 business days after the clinic has received the results. If you do not hear from us within 7 days, please contact the clinic through MyChart or phone. If you have a critical or abnormal lab result, we will notify you by phone as soon as possible.  Submit refill requests through "[employer]" or call your pharmacy and they will forward the refill request to us. Please allow 3 business days for your refill to be completed.          Additional Information About Your Visit        Care EveryWhere ID     This is your Care EveryWhere ID. This could be used by other organizations to access your [place] medical records  [id number]        Your Vitals Were     Pulse Temperature Height Last Period Pulse Oximetry Breastfeeding?    77 97.3  F (36.3  C) (Oral) 5' 4.25\" (1.632 m) 09/08/2017 (Approximate) 96% No    BMI (Body Mass Index)                   29.09 kg/m2            Blood Pressure from Last 3 Encounters:   07/09/18 106/67   06/11/18 108/76   05/30/18 105/72    Weight from Last 3 Encounters:   07/09/18 170 lb 12.8 oz (77.5 kg)   06/11/18 177 lb 9.6 oz (80.6 kg)   05/30/18 190 lb (86.2 kg)              We Performed the Following     Pap imaged thin layer screen reflex " to HPV if ASCUS - recommend age 25 - 29     Wet prep          Today's Medication Changes          These changes are accurate as of 7/9/18  3:10 PM.  If you have any questions, ask your nurse or doctor.               These medicines have changed or have updated prescriptions.        Dose/Directions    escitalopram 20 MG tablet   Commonly known as:  LEXAPRO   This may have changed:    - how much to take  - how to take this  - when to take this  - additional instructions   Used for:  Post partum depression, SVEN (generalized anxiety disorder)   Changed by:  Ignacia Batista APRN CNP        Dose:  20 mg   Take 1 tablet (20 mg) by mouth daily   Quantity:  90 tablet   Refills:  1            Where to get your medicines      These medications were sent to [facility] - [place], [place] - [address]  [address] [facility] [address]     Phone:  [phone]     escitalopram 20 MG tablet                Primary Care Provider Office Phone # Fax #    [facility] [phone] [address] [facility] [address]        Equal Access to Services     [facility]: Hadii aad ku hadasho Soomaali, waaxda luqadaha, qaybta kaalmada adeegyada, waxay idiin hayмария durham . So [facility] [phone].    ATENCIÓN: Si habla español, tiene a espinosa disposición servicios gratuitos de asistencia lingüística. Susyame al [phone].    We comply with applicable federal civil rights laws and [place] laws. We do not discriminate on the basis of race, color, national origin, age, disability, sex, sexual orientation, or gender identity.            Thank you!     Thank you for choosing [facility]  for your care. Our goal is always to provide you with excellent care. Hearing back from our patients is one way we can continue to improve our services. Please take a few minutes to complete the written survey that you may receive in the mail after your visit with us. Thank  you!             Your Updated Medication List - Protect others around you: Learn how to safely use, store and throw away your medicines at www.disposemymeds.org.          This list is accurate as of 18  3:10 PM.  Always use your most recent med list.                   Brand Name Dispense Instructions for use Diagnosis    escitalopram 20 MG tablet    LEXAPRO    90 tablet    Take 1 tablet (20 mg) by mouth daily    Post partum depression, SVEN (generalized anxiety disorder)       prenatal multivitamin plus iron 27-0.8 MG Tabs per tablet      Take 1 tablet by mouth daily    Previous  delivery affecting pregnancy

## 2018-07-10 ASSESSMENT — PATIENT HEALTH QUESTIONNAIRE - PHQ9: SUM OF ALL RESPONSES TO PHQ QUESTIONS 1-9: 12

## 2018-07-10 ASSESSMENT — ANXIETY QUESTIONNAIRES: GAD7 TOTAL SCORE: 13

## 2018-07-11 LAB
COPATH REPORT: NORMAL
PAP: NORMAL

## 2018-08-24 ENCOUNTER — PRENATAL OFFICE VISIT (OUTPATIENT)
Dept: OBGYN | Facility: CLINIC | Age: 28
End: 2018-08-24
Payer: COMMERCIAL

## 2018-08-24 VITALS
BODY MASS INDEX: 29.06 KG/M2 | WEIGHT: 170.6 LBS | DIASTOLIC BLOOD PRESSURE: 68 MMHG | HEART RATE: 92 BPM | TEMPERATURE: 98 F | SYSTOLIC BLOOD PRESSURE: 109 MMHG

## 2018-08-24 DIAGNOSIS — F41.1 GAD (GENERALIZED ANXIETY DISORDER): ICD-10-CM

## 2018-08-24 PROCEDURE — 99213 OFFICE O/P EST LOW 20 MIN: CPT | Performed by: NURSE PRACTITIONER

## 2018-08-24 ASSESSMENT — ANXIETY QUESTIONNAIRES
2. NOT BEING ABLE TO STOP OR CONTROL WORRYING: MORE THAN HALF THE DAYS
6. BECOMING EASILY ANNOYED OR IRRITABLE: SEVERAL DAYS
1. FEELING NERVOUS, ANXIOUS, OR ON EDGE: MORE THAN HALF THE DAYS
7. FEELING AFRAID AS IF SOMETHING AWFUL MIGHT HAPPEN: NEARLY EVERY DAY
3. WORRYING TOO MUCH ABOUT DIFFERENT THINGS: MORE THAN HALF THE DAYS
GAD7 TOTAL SCORE: 15
5. BEING SO RESTLESS THAT IT IS HARD TO SIT STILL: MORE THAN HALF THE DAYS

## 2018-08-24 ASSESSMENT — PATIENT HEALTH QUESTIONNAIRE - PHQ9: 5. POOR APPETITE OR OVEREATING: NEARLY EVERY DAY

## 2018-08-24 NOTE — LETTER
[facility]  [address] [address]  [phone]      RE: Hansa Ta  : 1990    DATE: 2018      To Whom It May Concern,        Hansa Ta was seen in the clinic today for follow up. She is continuing to make improvements in her post pregnancy complications. At this time, she can return to work for 8 hour shifts starting Wednesday Oct 3, 2018, barring any unforseen setbacks in her progress. From now until Oct 3, we are still recommending 6 hour shifts. We appreciate your understanding in this matter.      Thank you.      Sincerely,            Ignacia ASIF CNP

## 2018-08-24 NOTE — MR AVS SNAPSHOT
After Visit Summary   8/24/2018    Hansa Ta    MRN: [id number]           Patient Information     Date Of Birth          1990        Visit Information        Provider Department      8/24/2018 1:50 PM Ignacia Batista APRN CNP [facility]        Today's Diagnoses     Post partum depression    -  1    SVEN (generalized anxiety disorder)           Follow-ups after your visit        Who to contact     If you have questions or need follow up information about today's clinic visit or your schedule please contact [facility] [employer] directly at [phone].  Normal or non-critical lab and imaging results will be communicated to you by MyChart, letter or phone within 4 business days after the clinic has received the results. If you do not hear from us within 7 days, please contact the clinic through MyChart or phone. If you have a critical or abnormal lab result, we will notify you by phone as soon as possible.  Submit refill requests through [employer] or call your pharmacy and they will forward the refill request to us. Please allow 3 business days for your refill to be completed.          Additional Information About Your Visit        Care EveryWhere ID     This is your Care EveryWhere ID. This could be used by other organizations to access your [place] medical records  [id number]        Your Vitals Were     Pulse Temperature Last Period Breastfeeding? BMI (Body Mass Index)       92 98  F (36.7  C) (Oral) 08/05/2018 No 29.06 kg/m2        Blood Pressure from Last 3 Encounters:   08/24/18 109/68   07/09/18 106/67   06/11/18 108/76    Weight from Last 3 Encounters:   08/24/18 170 lb 9.6 oz (77.4 kg)   07/09/18 170 lb 12.8 oz (77.5 kg)   06/11/18 177 lb 9.6 oz (80.6 kg)              Today, you had the following     No orders found for display       Primary Care Provider Office Phone # Fax #    [facility] [phone] [address] [facility]  [place] [address]        Equal Access to Services     [facility] : Hadii aad ku haddonnaemiliano Jenniferali, waterrida luqmattha, qameghata melyloverosa melendez. So [facility] [phone].    ATENCIÓN: Si habla español, tiene a espinosa disposición servicios gratuitos de asistencia lingüística. Llame al [phone].    We comply with applicable federal civil rights laws and [place] laws. We do not discriminate on the basis of race, color, national origin, age, disability, sex, sexual orientation, or gender identity.            Thank you!     Thank you for choosing [facility]  for your care. Our goal is always to provide you with excellent care. Hearing back from our patients is one way we can continue to improve our services. Please take a few minutes to complete the written survey that you may receive in the mail after your visit with us. Thank you!             Your Updated Medication List - Protect others around you: Learn how to safely use, store and throw away your medicines at www.disposemymeds.org.          This list is accurate as of 18  3:04 PM.  Always use your most recent med list.                   Brand Name Dispense Instructions for use Diagnosis    escitalopram 20 MG tablet    LEXAPRO    90 tablet    Take 1 tablet (20 mg) by mouth daily    Post partum depression, SVEN (generalized anxiety disorder)       prenatal multivitamin plus iron 27-0.8 MG Tabs per tablet      Take 1 tablet by mouth daily    Previous  delivery affecting pregnancy

## 2018-08-24 NOTE — PROGRESS NOTES
SUBJECTIVE:   Hansa Ta is a 28 year old female who presents to clinic today for the following health issues:    Follow up on depression and anxiety and also workability.     Patient had a history of severe depression after her first pregnancy. After this recent pregnancy, we started her on a different medication and she has done well. Patient reports that her depression symptoms are essentially not an issue at this time. She is happy, bonding well with baby and her toddler. Finding time to go do things on her own. Anxiety is improving, but still has trouble relaxing and sitting still. Feels there is always something that can be or should be done. Some concentration difficulties at work, but overall feels the Lexapro is a much better fit for her.  Continues to work with a chiropractor for her back and sciatic issues. They have been able to reduce her visits to once weekly at this time. He has given her exercises to do at the gym and while at work and they have helped. Right now is on work restrictions of 6 hour/shift. Would like to continue this until her first shift in October and then increase shifts to 8 hours.   Wants to continue same dose of Lexapro. No adverse side effects, has more energy, interest in things.  and her parents have noticed a drastic improvement. Denies thoughts of harm to self or others.    Problem list and histories reviewed & adjusted, as indicated.  Additional history: as documented    Patient Active Problem List   Diagnosis     Endometriosis     Ovarian cyst     CARDIOVASCULAR SCREENING; LDL GOAL LESS THAN 160     Post partum depression     Previous  delivery affecting pregnancy     SVEN (generalized anxiety disorder)     Past Surgical History:   Procedure Laterality Date     APPENDECTOMY       C/SECTION, LOW TRANSVERSE       LAPAROSCOPY DIAGNOSTIC (GYN)      4     OTHER SURGICAL HISTORY      left ovary removed, for cysts       Social History   Substance Use Topics      Smoking status: Never Smoker     Smokeless tobacco: Never Used     Alcohol use No     Family History   Problem Relation Age of Onset     Spine Problems Mother      Lung Cancer Father            Reviewed and updated as needed this visit by clinical staff  Tobacco  Allergies  Meds  Problems  Med Hx  Surg Hx  Fam Hx  Soc Hx        Reviewed and updated as needed this visit by Provider  Tobacco  Allergies  Meds  Problems  Med Hx  Surg Hx  Fam Hx  Soc Hx          ROS:  Constitutional, HEENT, cardiovascular, pulmonary, gi and gu systems are negative, except as otherwise noted.    OBJECTIVE:     /68  Pulse 92  Temp 98  F (36.7  C) (Oral)  Wt 170 lb 9.6 oz (77.4 kg)  LMP 08/05/2018  Breastfeeding? No  BMI 29.06 kg/m2  Body mass index is 29.06 kg/(m^2).  GENERAL: healthy, alert and no distress  RESP: lungs clear to auscultation - no rales, rhonchi or wheezes  CV: regular rate and rhythm, normal S1 S2, no S3 or S4, no murmur, click or rub, no peripheral edema and peripheral pulses strong  MS: no gross musculoskeletal defects noted, no edema  SKIN: no suspicious lesions or rashes  PSYCH: mentation appears normal, affect normal/bright  GAD7 score: 15  PHQ-9 score:    PHQ-9 SCORE 8/24/2018   Total Score MyChart -   Total Score 8       ASSESSMENT/PLAN:   1. Post partum depression  We reviewed her PHQ-9 and SVEN questionnaires. Though her SVEN score increased, she feels she is handling and dealing with everything much better at this time. Feels stable on current dose of Lexapro and for now does not want to change. Will continue at this time. To ER immediately with thoughts of harm to self or other and she verbalizes understanding.     2. SVEN (generalized anxiety disorder)  See above    We will give her a work letter-continue 6 hour shifts until 10/3/18 and then increase to 8 hour shifts. Patient would like to return to clinic in another 1 month or so. Feels better coming in for her depression and anxiety  even when it is well controlled, will return to clinic sooner if needed or symptoms worsen.    LAYA [facility]ws [facility]

## 2018-08-25 ASSESSMENT — PATIENT HEALTH QUESTIONNAIRE - PHQ9: SUM OF ALL RESPONSES TO PHQ QUESTIONS 1-9: 8

## 2018-08-25 ASSESSMENT — ANXIETY QUESTIONNAIRES: GAD7 TOTAL SCORE: 15

## 2019-07-04 ENCOUNTER — HOSPITAL ENCOUNTER (EMERGENCY)
Facility: CLINIC | Age: 29
Discharge: HOME | End: 2019-07-04
Attending: NURSE PRACTITIONER | Admitting: NURSE PRACTITIONER
Payer: OTHER GOVERNMENT

## 2019-07-04 VITALS
SYSTOLIC BLOOD PRESSURE: 101 MMHG | RESPIRATION RATE: 16 BRPM | DIASTOLIC BLOOD PRESSURE: 69 MMHG | OXYGEN SATURATION: 99 % | TEMPERATURE: 98.6 F | HEART RATE: 78 BPM

## 2019-07-04 DIAGNOSIS — S39.012A BACK STRAIN, INITIAL ENCOUNTER: ICD-10-CM

## 2019-07-04 PROCEDURE — G0463 HOSPITAL OUTPT CLINIC VISIT: HCPCS | Performed by: NURSE PRACTITIONER

## 2019-07-04 PROCEDURE — 99214 OFFICE O/P EST MOD 30 MIN: CPT | Mod: Z6 | Performed by: NURSE PRACTITIONER

## 2019-07-04 RX ORDER — IBUPROFEN 800 MG/1
800 TABLET, FILM COATED ORAL EVERY 8 HOURS PRN
Qty: 60 TABLET | Refills: 0 | Status: SHIPPED | OUTPATIENT
Start: 2019-07-04 | End: 2019-08-06

## 2019-07-04 ASSESSMENT — ENCOUNTER SYMPTOMS
ARTHRALGIAS: 0
HEADACHES: 0
JOINT SWELLING: 0
NECK PAIN: 0
NUMBNESS: 0
CARDIOVASCULAR NEGATIVE: 1
RESPIRATORY NEGATIVE: 1
MYALGIAS: 0
FEVER: 0
NECK STIFFNESS: 0
DIZZINESS: 0
STRIDOR: 0
SHORTNESS OF BREATH: 0
CHILLS: 0
WEAKNESS: 0
COUGH: 0
LIGHT-HEADEDNESS: 0
CHEST TIGHTNESS: 0
BACK PAIN: 1

## 2019-07-04 NOTE — ED AVS SNAPSHOT
[facility]  [address] [address]  Phone:  [phone]  Fax:  [phone]                                    Hansa Ta   MRN: [id number]    Department:  [facility]   Date of Visit:  7/4/2019           After Visit Summary Signature Page    I have received my discharge instructions, and my questions have been answered. I have discussed any challenges I see with this plan with the nurse or doctor.    ..........................................................................................................................................  Patient/Patient Representative Signature      ..........................................................................................................................................  Patient Representative Print Name and Relationship to Patient    ..................................................               ................................................  Date                                   Time    ..........................................................................................................................................  Reviewed by Signature/Title    ...................................................              ..............................................  Date                                               Time          22EPIC Rev 08/18

## 2019-07-04 NOTE — ED PROVIDER NOTES
History     Chief Complaint   Patient presents with     Back Pain     worse with movement; trauma x 2 days ago     HPI  Hansa Ta is a 29 year old female who presents to the urgent care with complaint of back pain.  On Tuesday patient was carrying son on the left side when they missed the last step.  Patient grabbed the handle with her right side and felt immediate strain along the left side of her back.  Patient now complaining of pain to the upper mid and lower back only on the left side.  Denies any numbness or tingling.  No saddle paresthesia.  Patient is able to complete full range of motion however states extreme pain with twisting, going from sitting to standing and rolling over in bed.  Patient took ibuprofen once at home.    Allergies:  No Known Allergies    Problem List:    Patient Active Problem List    Diagnosis Date Noted     SVEN (generalized anxiety disorder) 2018     Priority: Medium     Previous  delivery affecting pregnancy 10/27/2017     Priority: Medium     Post partum depression 2015     Priority: Medium     Endometriosis 2014     Priority: Medium     Ovarian cyst 2014     Priority: Medium     Problem list name updated by automated process. Provider to review       CARDIOVASCULAR SCREENING; LDL GOAL LESS THAN 160 2014     Priority: Medium        Past Medical History:    Past Medical History:   Diagnosis Date     Endometriosis      Ovarian cyst        Past Surgical History:    Past Surgical History:   Procedure Laterality Date     APPENDECTOMY       C/SECTION, LOW TRANSVERSE       LAPAROSCOPY DIAGNOSTIC (GYN)      4     OTHER SURGICAL HISTORY      left ovary removed, for cysts       Family History:    Family History   Problem Relation Age of Onset     Spine Problems Mother      Lung Cancer Father        Social History:  Marital Status:   [2]  Social History     Tobacco Use     Smoking status: Never Smoker     Smokeless tobacco: Never Used    Substance Use Topics     Alcohol use: No     Drug use: No        Medications:      diclofenac (VOLTAREN) 1 % topical gel   ibuprofen (ADVIL/MOTRIN) 800 MG tablet   escitalopram (LEXAPRO) 20 MG tablet   Prenatal Vit-Fe Fumarate-FA (PRENATAL MULTIVITAMIN PLUS IRON) 27-0.8 MG TABS per tablet         Review of Systems   Constitutional: Negative for chills and fever.   Respiratory: Negative.  Negative for cough, chest tightness, shortness of breath and stridor.    Cardiovascular: Negative.  Negative for chest pain.   Musculoskeletal: Positive for back pain. Negative for arthralgias, gait problem, joint swelling, myalgias, neck pain and neck stiffness.   Skin: Negative for pallor.   Neurological: Negative for dizziness, weakness, light-headedness, numbness and headaches.       Physical Exam   BP: 101/69  Pulse: 78  Temp: 98.6  F (37  C)  Resp: 16  SpO2: 99 %      Physical Exam   Constitutional: She is oriented to person, place, and time. She appears well-developed and well-nourished. No distress.   Cardiovascular: Normal rate and regular rhythm.   Pulmonary/Chest: Effort normal and breath sounds normal. No respiratory distress.   Musculoskeletal:   Muscular tenderness along perispinal muscles to the left side. Right side normal. Full ROM however does not pain through exam. CMS intact. Pulses equal bilaterally. No bony tenderness or step offs.    Neurological: She is alert and oriented to person, place, and time.   Skin: Skin is warm. Capillary refill takes less than 2 seconds. She is not diaphoretic.       ED Course        Procedures                   No results found for this or any previous visit (from the past 24 hour(s)).    Medications - No data to display    Assessments & Plan (with Medical Decision Making)   Patient is a 29 year old female who presents to the urgent care for complaint of back pain. Patient has left sided perispinal muscular tenderness with palpation. There is no obvious deformity, step offs or  bony tenderness. I agree with patient in that I do not feel an xray in indicated at this time as there was no significant injury and all pain seems to be muscular in origin. Provided patient with diclofenac gel and ibuprofen. Discussed concurrent dosing with patient and safety monitoring. Apply heat/ice to the area. Rest the back but do not begin bed rest. Return with any new or worsening symptoms. Patient agreeable to plan of care, all questions answered. Patient ambulating comfortably and in no distress upon discharge.   I have reviewed the nursing notes.    I have reviewed the findings, diagnosis, plan and need for follow up with the patient.       Medication List      Started    diclofenac 1 % topical gel  Commonly known as:  VOLTAREN  2 g, Transdermal, 4 TIMES DAILY     ibuprofen 800 MG tablet  Commonly known as:  ADVIL/MOTRIN  800 mg, Oral, EVERY 8 HOURS PRN            Final diagnoses:   Back strain, initial encounter       7/4/2019   [facility] EMERGENCY DEPARTMENT     Vidhya Gutiérrez, APRN CNP  07/04/19 [id number]

## 2019-07-04 NOTE — LETTER
July 4, 2019      To Whom It May Concern:      Hansa Ta was seen in our Emergency Department today, 07/04/19.  I expect her condition to improve over the next 3 days. Please allow Hansa to have a 6 hour work restriction for the remainder of the week. Please allow adequate breaks in order for her to rest her back as needed. She may return to regularly scheduled work next week.    Sincerely,        LAYA Beal CNP

## 2019-08-05 NOTE — PROGRESS NOTES
Subjective     Hansa Ta is a 29 year old female who presents to clinic today for the following health issues:    HPI   Concern - TMJ   Onset: a few weeks     Description:   Cannot find a comfortable way to rest her jaw. Waking up with jaw clenched most mornings and having some pain radiating from jaw on the left side into the ear.     States she has always had jaw pain and noticed pain after eating or chewing. She states she goes to dentist regularly she has never been told she grinds teeth.    Patient Active Problem List   Diagnosis     Endometriosis     Ovarian cyst     CARDIOVASCULAR SCREENING; LDL GOAL LESS THAN 160     Post partum depression     Previous  delivery affecting pregnancy     SVEN (generalized anxiety disorder)     Iron deficiency anemia due to chronic blood loss     Adjustment disorder with depressed mood     Anxiety     Endometritis     Panic attack     RUQ abdominal pain     Sacroiliac joint pain     Wound infection     Past Surgical History:   Procedure Laterality Date     APPENDECTOMY       C/SECTION, LOW TRANSVERSE       LAPAROSCOPY DIAGNOSTIC (GYN)      4     OTHER SURGICAL HISTORY      left ovary removed, for cysts       Social History     Tobacco Use     Smoking status: Never Smoker     Smokeless tobacco: Never Used   Substance Use Topics     Alcohol use: No     Family History   Problem Relation Age of Onset     Spine Problems Mother      Lung Cancer Father          No current outpatient medications on file.     No Known Allergies  BP Readings from Last 3 Encounters:   19 110/70   19 101/69   18 109/68    Wt Readings from Last 3 Encounters:   19 84.8 kg (187 lb)   18 77.4 kg (170 lb 9.6 oz)   18 77.5 kg (170 lb 12.8 oz)         Reviewed and updated as needed this visit by Provider    Review of Systems   ROS COMP: Constitutional, HEENT, cardiovascular, pulmonary, GI, , musculoskeletal, neuro, skin, endocrine and psych systems are negative,  except as otherwise noted.      Objective    There were no vitals taken for this visit.  There is no height or weight on file to calculate BMI.  Physical Exam   GENERAL: healthy, alert and no distress  EYES: Eyes grossly normal to inspection, PERRL and conjunctivae and sclerae normal  HENT: normal cephalic/atraumatic, ear canals and TM's normal, nose and mouth without ulcers or lesions, oropharynx clear, oral mucous membranes moist   NECK: no adenopathy, no asymmetry, masses, or scars and thyroid normal to palpation  RESP: lungs clear to auscultation - no rales, rhonchi or wheezes  CV: regular rate and rhythm, normal S1 S2, no S3 or S4, no murmur, click or rub, no peripheral edema and peripheral pulses strong  MS: no gross musculoskeletal defects noted, no edema  The left temporomandibular joint reveals tenderness, pain and clicking with opening.with opening. Teeth are non tender.     Assessment & Plan     1. TMJ (temporomandibular joint syndrome)    PLAN:  Recommended a soft diet, prn NSAID's and specialty consult. Explained nature of TMJ syndrome, treatment modalities and insurance coverage issues.     - OTOLARYNGOLOGY REFERRAL       Patient Instructions     Patient Education     Self-Care for Temporomandibular Disorders (TMD)    You have temporomandibular disorder (TMD). This term describes a group of problems related to the temporomandibular joint (TMJ) and nearby muscles. The TMJ is located where the upper and lower jaws meet. Treatment will get your jaw back to normal function. But your care doesn t end there. Once you ve had TMD, it s important to avoid reinjury. Get in the habit of doing self-checks. This can make you aware of any symptoms that begin to return, so you can take action right away.  Doing self-checks  Make it a habit to assess your body a few times each day. Try writing yourself a reminder. Or set an alarm on your watch or computer. When doing a self-check, ask yourself:    Do I feel  stressed?    Are my muscles tense?    Am I grinding or clenching my teeth?    Is my posture healthy for my body?    Is there anything I can do to make myself more comfortable?  If you answer yes to any of the questions above, you need to take action. Changing your posture or taking a short break can help prevent or relieve TMD symptoms.  Listening to your body  Many people get used to ignoring pain. But pain is a signal that your body needs care. To maintain your TMJ health:    Don t eat hard or chewy foods. Even if you feel fine, eating such foods can trigger symptoms again.    Be aware of your body. Don t ignore TMD symptoms. The nagging pain in your neck or jaw may be a sign that you need care.    Keep follow-up appointments. Be sure to keep all appointments with your healthcare team.                                                                                                                                Managing stress  Stress is a key factor in TMD. Stress can make you clench your muscles or grind your teeth. It can also affect your sleep, reducing your body s ability to heal. Here are a few tips to manage stress:    Learn ways to relax. Try listening to music or gently stretching. Take a few slow deep breaths. Or, close your eyes and imagine a place or object that is calming.    Get plenty of rest and sleep.    Set goals you know you can attain.    Make time for people and things you enjoy.    Ask for help if you need it. Friends and family can run errands and cook meals for you.   Staying active  Activity helps the body in many ways. You stay looser and more relaxed. It also helps keep muscles and tissues conditioned. That way you can heal faster and make reinjury less likely. Here are some tips to get you started:    Talk with your healthcare provider before starting an exercise program.    Always warm up and stretch before each activity. This helps prevent injury.    Try walking or swimming. These  activities are easy on your joints. They also benefit your heart and lungs.    Try yoga or mercedes chi. These are relaxing activities known for reducing stress.  Date Last Reviewed: 8/1/2017 2000-2018 [employer]. [address] [place] [address], [place] [address]. All rights reserved. This information is not intended as a substitute for professional medical care. Always follow your healthcare professional's instructions.               No follow-ups on file.    LAYA [facility]    Answers for HPI/ROS submitted by the patient on 8/6/2019   If you checked off any problems, how difficult have these problems made it for you to do your work, take care of things at home, or get along with other people?: Somewhat difficult  PHQ9 TOTAL SCORE: 4  SVEN 7 TOTAL SCORE: 8

## 2019-08-06 ENCOUNTER — OFFICE VISIT (OUTPATIENT)
Dept: FAMILY MEDICINE | Facility: OTHER | Age: 29
End: 2019-08-06
Payer: OTHER GOVERNMENT

## 2019-08-06 VITALS
BODY MASS INDEX: 31.85 KG/M2 | WEIGHT: 187 LBS | DIASTOLIC BLOOD PRESSURE: 70 MMHG | RESPIRATION RATE: 16 BRPM | TEMPERATURE: 97.8 F | HEART RATE: 90 BPM | SYSTOLIC BLOOD PRESSURE: 110 MMHG | OXYGEN SATURATION: 100 %

## 2019-08-06 DIAGNOSIS — M26.609 TMJ (TEMPOROMANDIBULAR JOINT SYNDROME): Primary | ICD-10-CM

## 2019-08-06 PROCEDURE — 99213 OFFICE O/P EST LOW 20 MIN: CPT | Performed by: NURSE PRACTITIONER

## 2019-08-06 ASSESSMENT — ANXIETY QUESTIONNAIRES
GAD7 TOTAL SCORE: 8
7. FEELING AFRAID AS IF SOMETHING AWFUL MIGHT HAPPEN: NOT AT ALL
7. FEELING AFRAID AS IF SOMETHING AWFUL MIGHT HAPPEN: NOT AT ALL
5. BEING SO RESTLESS THAT IT IS HARD TO SIT STILL: MORE THAN HALF THE DAYS
1. FEELING NERVOUS, ANXIOUS, OR ON EDGE: MORE THAN HALF THE DAYS
GAD7 TOTAL SCORE: 8
3. WORRYING TOO MUCH ABOUT DIFFERENT THINGS: NOT AT ALL
2. NOT BEING ABLE TO STOP OR CONTROL WORRYING: NOT AT ALL
4. TROUBLE RELAXING: MORE THAN HALF THE DAYS
6. BECOMING EASILY ANNOYED OR IRRITABLE: MORE THAN HALF THE DAYS
GAD7 TOTAL SCORE: 8

## 2019-08-06 ASSESSMENT — PATIENT HEALTH QUESTIONNAIRE - PHQ9
10. IF YOU CHECKED OFF ANY PROBLEMS, HOW DIFFICULT HAVE THESE PROBLEMS MADE IT FOR YOU TO DO YOUR WORK, TAKE CARE OF THINGS AT HOME, OR GET ALONG WITH OTHER PEOPLE: SOMEWHAT DIFFICULT
SUM OF ALL RESPONSES TO PHQ QUESTIONS 1-9: 4
SUM OF ALL RESPONSES TO PHQ QUESTIONS 1-9: 4

## 2019-08-06 NOTE — LETTER
My Depression Action Plan  Name: Hansa Ta   Date of Birth 1990  Date: 8/6/2019    My doctor: Alyssa Jon   My clinic: [facility]  [id number] [place] [id number] [phone]          GREEN    ZONE   Good Control    What it looks like:     Things are going generally well. You have normal up s and down s. You may even feel depressed from time to time, but bad moods usually last less than a day.   What you need to do:  1. Continue to care for yourself (see self care plan)  2. Check your depression survival kit and update it as needed  3. Follow your physician s recommendations including any medication.  4. Do not stop taking medication unless you consult with your physician first.           YELLOW         ZONE Getting Worse    What it looks like:     Depression is starting to interfere with your life.     It may be hard to get out of bed; you may be starting to isolate yourself from others.    Symptoms of depression are starting to last most all day and this has happened for several days.     You may have suicidal thoughts but they are not constant.   What you need to do:     1. Call your care team, your response to treatment will improve if you keep your care team informed of your progress. Yellow periods are signs an adjustment may need to be made.     2. Continue your self-care, even if you have to fake it!    3. Talk to someone in your support network    4. Open up your depression survival kit           RED    ZONE Medical Alert - Get Help    What it looks like:     Depression is seriously interfering with your life.     You may experience these or other symptoms: You can t get out of bed most days, can t work or engage in other necessary activities, you have trouble taking care of basic hygiene, or basic responsibilities, thoughts of suicide or death that will not go away, self-injurious behavior.     What you need to do:  1. Call your care team and  request a same-day appointment. If they are not available (weekends or after hours) call your local crisis line, emergency room or 911.            Depression Self Care Plan / Survival Kit    Self-Care for Depression  Here s the deal. Your body and mind are really not as separate as most people think.  What you do and think affects how you feel and how you feel influences what you do and think. This means if you do things that people who feel good do, it will help you feel better.  Sometimes this is all it takes.  There is also a place for medication and therapy depending on how severe your depression is, so be sure to consult with your medical provider and/ or Behavioral Health Consultant if your symptoms are worsening or not improving.     In order to better manage my stress, I will:    Exercise  Get some form of exercise, every day. This will help reduce pain and release endorphins, the  feel good  chemicals in your brain. This is almost as good as taking antidepressants!  This is not the same as joining a gym and then never going! (they count on that by the way ) It can be as simple as just going for a walk or doing some gardening, anything that will get you moving.      Hygiene   Maintain good hygiene (Get out of bed in the morning, Make your bed, Brush your teeth, Take a shower, and Get dressed like you were going to work, even if you are unemployed).  If your clothes don't fit try to get ones that do.    Diet  I will strive to eat foods that are good for me, drink plenty of water, and avoid excessive sugar, caffeine, alcohol, and other mood-altering substances.  Some foods that are helpful in depression are: complex carbohydrates, B vitamins, flaxseed, fish or fish oil, fresh fruits and vegetables.    Psychotherapy  I agree to participate in Individual Therapy (if recommended).    Medication  If prescribed medications, I agree to take them.  Missing doses can result in serious side effects.  I understand that  drinking alcohol, or other illicit drug use, may cause potential side effects.  I will not stop my medication abruptly without first discussing it with my provider.    Staying Connected With Others  I will stay in touch with my friends, family members, and my primary care provider/team.    Use your imagination  Be creative.  We all have a creative side; it doesn t matter if it s oil painting, sand castles, or mud pies! This will also kick up the endorphins.    Witness Beauty  (AKA stop and smell the roses) Take a look outside, even in mid-winter. Notice colors, textures. Watch the squirrels and birds.     Service to others  Be of service to others.  There is always someone else in need.  By helping others we can  get out of ourselves  and remember the really important things.  This also provides opportunities for practicing all the other parts of the program.    Humor  Laugh and be silly!  Adjust your TV habits for less news and crime-drama and more comedy.    Control your stress  Try breathing deep, massage therapy, biofeedback, and meditation. Find time to relax each day.     My support system    Clinic Contact:  Phone number:    Contact 1:  Phone number:    Contact 2:  Phone number:    Nondenominational/:  Phone number:    Therapist:  Phone number:    Local crisis center:    Phone number:    Other community support:  Phone number:

## 2019-08-06 NOTE — PATIENT INSTRUCTIONS
Patient Education     Self-Care for Temporomandibular Disorders (TMD)    You have temporomandibular disorder (TMD). This term describes a group of problems related to the temporomandibular joint (TMJ) and nearby muscles. The TMJ is located where the upper and lower jaws meet. Treatment will get your jaw back to normal function. But your care doesn t end there. Once you ve had TMD, it s important to avoid reinjury. Get in the habit of doing self-checks. This can make you aware of any symptoms that begin to return, so you can take action right away.  Doing self-checks  Make it a habit to assess your body a few times each day. Try writing yourself a reminder. Or set an alarm on your watch or computer. When doing a self-check, ask yourself:    Do I feel stressed?    Are my muscles tense?    Am I grinding or clenching my teeth?    Is my posture healthy for my body?    Is there anything I can do to make myself more comfortable?  If you answer yes to any of the questions above, you need to take action. Changing your posture or taking a short break can help prevent or relieve TMD symptoms.  Listening to your body  Many people get used to ignoring pain. But pain is a signal that your body needs care. To maintain your TMJ health:    Don t eat hard or chewy foods. Even if you feel fine, eating such foods can trigger symptoms again.    Be aware of your body. Don t ignore TMD symptoms. The nagging pain in your neck or jaw may be a sign that you need care.    Keep follow-up appointments. Be sure to keep all appointments with your healthcare team.                                                                                                                                Managing stress  Stress is a key factor in TMD. Stress can make you clench your muscles or grind your teeth. It can also affect your sleep, reducing your body s ability to heal. Here are a few tips to manage stress:    Learn ways to relax. Try listening to  music or gently stretching. Take a few slow deep breaths. Or, close your eyes and imagine a place or object that is calming.    Get plenty of rest and sleep.    Set goals you know you can attain.    Make time for people and things you enjoy.    Ask for help if you need it. Friends and family can run errands and cook meals for you.   Staying active  Activity helps the body in many ways. You stay looser and more relaxed. It also helps keep muscles and tissues conditioned. That way you can heal faster and make reinjury less likely. Here are some tips to get you started:    Talk with your healthcare provider before starting an exercise program.    Always warm up and stretch before each activity. This helps prevent injury.    Try walking or swimming. These activities are easy on your joints. They also benefit your heart and lungs.    Try yoga or mercedes chi. These are relaxing activities known for reducing stress.  Date Last Reviewed: 8/1/2017 2000-2018 [employer]. [address] [place] [address], [place] [id number]. All rights reserved. This information is not intended as a substitute for professional medical care. Always follow your healthcare professional's instructions.

## 2019-08-07 ENCOUNTER — TELEPHONE (OUTPATIENT)
Dept: FAMILY MEDICINE | Facility: OTHER | Age: 29
End: 2019-08-07

## 2019-08-07 DIAGNOSIS — M26.609 TMJ (TEMPOROMANDIBULAR JOINT SYNDROME): Primary | ICD-10-CM

## 2019-08-07 RX ORDER — METAXALONE 800 MG/1
800 TABLET ORAL 3 TIMES DAILY
Qty: 90 TABLET | Refills: 0 | Status: SHIPPED | OUTPATIENT
Start: 2019-08-07 | End: 2019-12-12

## 2019-08-07 ASSESSMENT — PATIENT HEALTH QUESTIONNAIRE - PHQ9: SUM OF ALL RESPONSES TO PHQ QUESTIONS 1-9: 4

## 2019-08-07 ASSESSMENT — ANXIETY QUESTIONNAIRES: GAD7 TOTAL SCORE: 8

## 2019-08-07 NOTE — TELEPHONE ENCOUNTER
RX Metalaxone 800 mg 3 times daily for pain related to TMJ sent to [facility] pharmacy.     Thank you  Mery Perkins CNP

## 2019-08-07 NOTE — TELEPHONE ENCOUNTER
Reason for Call:  Other prescription    Detailed comments: Patient was seen yesterday for lock jaw and is wondering if she could get any type of pain medication. She wasn't able to get appt with a specialist until 09/05/19 and would like something for the pain.    Phone Number Patient can be reached at: Home number on file [phone] (home)    Best Time: any    Can we leave a detailed message on this number? YES    Call taken on 8/7/2019 at 1:18 PM by Romelia Adkins

## 2019-09-05 ENCOUNTER — TRANSFERRED RECORDS (OUTPATIENT)
Dept: HEALTH INFORMATION MANAGEMENT | Facility: CLINIC | Age: 29
End: 2019-09-05

## 2019-12-11 NOTE — PROGRESS NOTES
Subjective     Hansa Ta is a 29 year old female who presents to clinic today for the following health issues:    HPI   Return to work note    Patient presents today for follow-up and note for work after having her gallbladder removed. She presented to the ER on 2019 with intense RUQ pain and was found to have elevated liver function tests. She underwent cholecystectomy the following day. Patient reports she overall is feeling well. Very minimal pain. No nausea or vomiting. Bowel movements have been normal. No fevers or sign of infection. Anxious to get back to work. Works as a .     Patient Active Problem List   Diagnosis     Endometriosis     Ovarian cyst     CARDIOVASCULAR SCREENING; LDL GOAL LESS THAN 160     Post partum depression     Previous  delivery affecting pregnancy     SVEN (generalized anxiety disorder)     Iron deficiency anemia due to chronic blood loss     Adjustment disorder with depressed mood     Anxiety     Endometritis     Panic attack     RUQ abdominal pain     Sacroiliac joint pain     Wound infection     Past Surgical History:   Procedure Laterality Date     APPENDECTOMY       C/SECTION, LOW TRANSVERSE       LAPAROSCOPY DIAGNOSTIC (GYN)      4     OTHER SURGICAL HISTORY      left ovary removed, for cysts       Social History     Tobacco Use     Smoking status: Never Smoker     Smokeless tobacco: Never Used   Substance Use Topics     Alcohol use: No     Family History   Problem Relation Age of Onset     Spine Problems Mother      Lung Cancer Father          No current outpatient medications on file.     No Known Allergies  BP Readings from Last 3 Encounters:   19 100/62   19 110/70   19 101/69    Wt Readings from Last 3 Encounters:   19 79.8 kg (176 lb)   19 84.8 kg (187 lb)   18 77.4 kg (170 lb 9.6 oz)         Reviewed and updated as needed this visit by Provider         Review of Systems   ROS COMP: Constitutional, HEENT,  "cardiovascular, pulmonary, gi and gu systems are negative, except as otherwise noted.      Objective    /62   Pulse 80   Temp 98.2  F (36.8  C) (Temporal)   Resp 16   Ht 1.626 m (5' 4\")   Wt 79.8 kg (176 lb)   LMP 11/28/2019   SpO2 95%   BMI 30.21 kg/m    Body mass index is 30.21 kg/m .  Physical Exam   GENERAL: healthy, alert and no distress  ABDOMEN: soft, nontender, no hepatosplenomegaly, no masses and bowel sounds normal, well healing incisions on abdomen, no sign of infection  SKIN: no suspicious lesions or rashes  PSYCH: mentation appears normal, affect normal/bright    Diagnostic Test Results:  Labs reviewed in Epic  none         Assessment & Plan     1. S/P cholecystectomy  Patient overall doing quite well. She does not have a job that requires a lot of heavy lifting. Will clear to go back to work with the ability to sit as needed on her shift. She will also follow-up with her surgeon on 12/27 as planned.     The patient indicates understanding of these issues and agrees with the plan.    Brigid Sahu PA-C  [facility]    "

## 2019-12-12 ENCOUNTER — OFFICE VISIT (OUTPATIENT)
Dept: FAMILY MEDICINE | Facility: OTHER | Age: 29
End: 2019-12-12
Payer: OTHER GOVERNMENT

## 2019-12-12 VITALS
SYSTOLIC BLOOD PRESSURE: 100 MMHG | HEIGHT: 64 IN | WEIGHT: 176 LBS | HEART RATE: 80 BPM | TEMPERATURE: 98.2 F | OXYGEN SATURATION: 95 % | DIASTOLIC BLOOD PRESSURE: 62 MMHG | BODY MASS INDEX: 30.05 KG/M2 | RESPIRATION RATE: 16 BRPM

## 2019-12-12 DIAGNOSIS — Z90.49 S/P CHOLECYSTECTOMY: Primary | ICD-10-CM

## 2019-12-12 PROCEDURE — 99213 OFFICE O/P EST LOW 20 MIN: CPT | Performed by: PHYSICIAN ASSISTANT

## 2019-12-12 ASSESSMENT — MIFFLIN-ST. JEOR: SCORE: 1518.33

## 2019-12-12 NOTE — LETTER
December 12, 2019      Hansa Ta  [id number] [address] [place] [id number]        To Whom It May Concern:    Hansa Ta was seen in our clinic. She may return to work tomorrow without restrictions however should be allowed to sit as needed over the next 6 weeks.     Sincerely,        Brigid Sahu PA-C

## 2019-12-13 ENCOUNTER — TELEPHONE (OUTPATIENT)
Dept: FAMILY MEDICINE | Facility: OTHER | Age: 29
End: 2019-12-13

## 2019-12-13 NOTE — LETTER
December 13, 2019      Hansa Ta  [address] [address] [place] [id number]        To Whom It May Concern:    Hansa Ta was seen in our clinic. She may return to work tomorrow (12/14/2019)  without restrictions however should be allowed to sit as needed over the next 6 weeks.         Sincerely,        Brigid Sahu PA-C

## 2019-12-13 NOTE — TELEPHONE ENCOUNTER
Reason for Call:  Other note for work    Detailed comments: patient needs note changed. She will need it to say she will return to work tomorrow instead of today due to nausea and vomiting after surgery     Fax to [phone] freya Mendosa    Phone Number Patient can be reached at: Home number on file [phone] (home)    Best Time: any    Can we leave a detailed message on this number? YES    Call taken on 12/13/2019 at 3:39 PM by Christi Recinos

## 2020-04-13 NOTE — PROGRESS NOTES
"Hansa Ta is a 29 year old female who is being evaluated via a billable telephone visit.      The patient has been notified of following:     \"This telephone visit will be conducted via a call between you and your physician/provider. We have found that certain health care needs can be provided without the need for a physical exam.  This service lets us provide the care you need with a short phone conversation.  If a prescription is necessary we can send it directly to your pharmacy.  If lab work is needed we can place an order for that and you can then stop by our lab to have the test done at a later time.    Telephone visits are billed at different rates depending on your insurance coverage. During this emergency period, for some insurers they may be billed the same as an in-person visit.  Please reach out to your insurance provider with any questions.    If during the course of the call the physician/provider feels a telephone visit is not appropriate, you will not be charged for this service.\"    Patient has given verbal consent for Telephone visit?  Yes    How would you like to obtain your AVS? E-Mail (inform patient AVS not encrypted) [email or web address]    Subjective     Hansa Ta is a 29 year old female who presents to clinic today for the following health issues:    Vaginal Symptoms  Onset: last week     Description:  Vaginal Discharge: looks normal but more of it, white colored, no clumps  , not thick, there is an odor to it  Itching (Pruritis): YES  Burning sensation:  no   Odor: YES- occasionally     Accompanying Signs & Symptoms:  Pain with Urination: no   Abdominal Pain: no   Fever: no     History:   Sexually active: YES- can be sensitive during sex, seems to occur when she is having more intercourse and after her period as well  New Partner: no   Possibility of Pregnancy:  No    Precipitating factors:   Recent Antibiotic Use: no     Alleviating factors:  None     Therapies Tried and " outcome: nothing              Current Outpatient Medications   Medication Sig Dispense Refill     metroNIDAZOLE (METROGEL) 0.75 % vaginal gel Place 1 applicator (5 g) vaginally daily for 5 days 70 g 1     BP Readings from Last 3 Encounters:   12/12/19 100/62   08/06/19 110/70   07/04/19 101/69    Wt Readings from Last 3 Encounters:   12/12/19 79.8 kg (176 lb)   08/06/19 84.8 kg (187 lb)   08/24/18 77.4 kg (170 lb 9.6 oz)                    Reviewed and updated as needed this visit by Provider  Tobacco  Allergies  Meds  Problems  Med Hx  Surg Hx  Fam Hx         Review of Systems   As above       Objective   Reported vitals:  There were no vitals taken for this visit.   healthy, alert and no distress  PSYCH: Alert and oriented times 3; coherent speech, normal   rate and volume, able to articulate logical thoughts, able   to abstract reason, no tangential thoughts, no hallucinations   or delusions  Her affect is normal, pleasant  RESP: No cough, no audible wheezing, able to talk in full sentences  Remainder of exam unable to be completed due to telephone visits    Diagnostic Test Results:  Labs reviewed in Epic  none         Assessment/Plan:  This visit was completed virtually due to our current COVID 19 pandemic.  The patient will be seen as soon as possible in the office.  If there is any significant change in or worsening of symptoms patient will call in to be triaged, present to the emergency department, or call 911 if acute worsening is noted.    1. BV (bacterial vaginosis)  Presumed, if symptoms persist we could consider wet prep only but prefer to keep her out of clinic if possible, may repeat as needed, she will also try probiotics  - metroNIDAZOLE (METROGEL) 0.75 % vaginal gel; Place 1 applicator (5 g) vaginally daily for 5 days  Dispense: 70 g; Refill: 1  - NONPHYSICIAN TELEPHONE ASSESSMENT 5-10 MIN    Return in about 1 week (around 4/21/2020), or if symptoms worsen or fail to improve.      Phone call  duration:  5 minutes    Judith Fu PA-C

## 2020-04-14 ENCOUNTER — VIRTUAL VISIT (OUTPATIENT)
Dept: FAMILY MEDICINE | Facility: OTHER | Age: 30
End: 2020-04-14
Payer: OTHER GOVERNMENT

## 2020-04-14 DIAGNOSIS — N76.0 BV (BACTERIAL VAGINOSIS): Primary | ICD-10-CM

## 2020-04-14 DIAGNOSIS — B96.89 BV (BACTERIAL VAGINOSIS): Primary | ICD-10-CM

## 2020-04-14 PROCEDURE — 99213 OFFICE O/P EST LOW 20 MIN: CPT | Mod: 95 | Performed by: PHYSICIAN ASSISTANT

## 2020-04-14 RX ORDER — METRONIDAZOLE 7.5 MG/G
1 GEL VAGINAL DAILY
Qty: 70 G | Refills: 1 | Status: SHIPPED | OUTPATIENT
Start: 2020-04-14 | End: 2020-04-19

## 2020-06-09 ENCOUNTER — VIRTUAL VISIT (OUTPATIENT)
Dept: URGENT CARE | Facility: CLINIC | Age: 30
End: 2020-06-09
Payer: OTHER GOVERNMENT

## 2020-06-09 DIAGNOSIS — N39.0 URINARY TRACT INFECTION WITHOUT HEMATURIA, SITE UNSPECIFIED: Primary | ICD-10-CM

## 2020-06-09 PROCEDURE — 99214 OFFICE O/P EST MOD 30 MIN: CPT | Mod: 95 | Performed by: PHYSICIAN ASSISTANT

## 2020-06-09 RX ORDER — PHENAZOPYRIDINE HYDROCHLORIDE 100 MG/1
100 TABLET, FILM COATED ORAL 3 TIMES DAILY PRN
Qty: 6 TABLET | Refills: 0 | Status: SHIPPED | OUTPATIENT
Start: 2020-06-09 | End: 2020-06-11

## 2020-06-09 RX ORDER — FLUCONAZOLE 150 MG/1
150 TABLET ORAL ONCE
Qty: 1 TABLET | Refills: 0 | Status: SHIPPED | OUTPATIENT
Start: 2020-06-09 | End: 2020-06-09

## 2020-06-09 RX ORDER — NITROFURANTOIN 25; 75 MG/1; MG/1
100 CAPSULE ORAL 2 TIMES DAILY
Qty: 10 CAPSULE | Refills: 0 | Status: SHIPPED | OUTPATIENT
Start: 2020-06-09 | End: 2020-06-14

## 2020-06-10 NOTE — PATIENT INSTRUCTIONS
"Follow up with your primary care provider as needed for BV or yeast infection symptoms or alternative treatment options including boric acid vaginal suppositories. Boric acid vaginal suppositories should be kept out of reach from children as they can be harmful in ingested.   Take antibiotics as prescribed. Refrain from intercourse until antibiotic course is complete.   Start taking probiotics.   Take diflucan if you develop symptoms of a yeast infection.  Drink lots of fluids.    Patient Education     Bladder Infection, Female (Adult)    Urine is normally doesn't have any bacteria in it. But bacteria can get into the urinary tract from the skin around the rectum. Or they can travel in the blood from elsewhere in the body. Once they are in your urinary tract, they can cause infection in the urethra (urethritis), the bladder (cystitis), or the kidneys (pyelonephritis).  The most common place for an infection is in the bladder. This is called a bladder infection. This is one of the most common infections in women. Most bladder infections are easily treated. They are not serious unless the infection spreads to the kidney.  The phrases \"bladder infection,\" \"UTI,\" and \"cystitis\" are often used to describe the same thing. But they are not always the same. Cystitis is an inflammation of the bladder. The most common cause of cystitis is an infection.  Symptoms  The infection causes inflammation in the urethra and bladder. This causes many of the symptoms. The most common symptoms of a bladder infection are:    Pain or burning when urinating    Having to urinate more often than usual    Urgent need to urinate    Only a small amount of urine comes out    Blood in urine    Abdominal discomfort. This is usually in the lower abdomen above the pubic bone.    Cloudy urine    Strong- or bad-smelling urine    Unable to urinate (urinary retention)    Unable to hold urine in (urinary incontinence)    Fever    Loss of " appetite    Confusion (in older adults)  Causes  Bladder infections are not contagious. You can't get one from someone else, from a toilet seat, or from sharing a bath.  The most common cause of bladder infections is bacteria from the bowels. The bacteria get onto the skin around the opening of the urethra. From there, they can get into the urine and travel up to the bladder, causing inflammation and infection. This usually happens because of:    Wiping improperly after urinating. Always wipe from front to back.    Bowel incontinence    Pregnancy    Procedures such as having a catheter inserted    Older age    Not emptying your bladder. This can allow bacteria a chance to grow in your urine.    Dehydration    Constipation    Sex    Use of a diaphragm for birth control   Treatment  Bladder infections are diagnosed by a urine test. They are treated with antibiotics and usually clear up quickly without complications. Treatment helps prevent a more serious kidney infection.  Medicines  Medicines can help in the treatment of a bladder infection:    Take antibiotics until they are used up, even if you feel better. It is important to finish them to make sure the infection has cleared.    You can use acetaminophen or ibuprofen for pain, fever, or discomfort, unless another medicine was prescribed. If you have chronic liver or kidney disease, talk with your healthcare provider before using these medicines. Also talk with your provider if you've ever had a stomach ulcer or gastrointestinal bleeding, or are taking blood-thinner medicines.    If you are given phenazopydridine to reduce burning with urination, it will cause your urine to become a bright orange color. This can stain clothing.  Care and prevention  These self-care steps can help prevent future infections:    Drink plenty of fluids to prevent dehydration and flush out your bladder. Do this unless you must restrict fluids for other health reasons, or your doctor  told you not to.    Proper cleaning after going to the bathroom is important. Wipe from front to back after using the toilet to prevent the spread of bacteria.    Urinate more often. Don't try to hold urine in for a long time.    Wear loose-fitting clothes and cotton underwear. Avoid tight-fitting pants.    Improve your diet and prevent constipation. Eat more fresh fruit and vegetables, and fiber, and less junk and fatty foods.    Avoid sex until your symptoms are gone.    Avoid caffeine, alcohol, and spicy foods. These can irritate your bladder.    Urinate right after intercourse to flush out your bladder.    If you use birth control pills and have frequent bladder infections, discuss it with your doctor.  Follow-up care  Call your healthcare provider if all symptoms are not gone after 3 days of treatment. This is especially important if you have repeat infections.  If a culture was done, you will be told if your treatment needs to be changed. If directed, you can call to find out the results.  If X-rays were done, you will be told if the results will affect your treatment.  Call 911  Call 911 if any of the following occur:    Trouble breathing    Hard to wake up or confusion    Fainting or loss of consciousness    Rapid heart rate  When to seek medical advice  Call your healthcare provider right away if any of these occur:    Fever of 100.4 F (38.0 C) or higher, or as directed by your healthcare provider    Symptoms are not better by the third day of treatment    Back or belly (abdominal) pain that gets worse    Repeated vomiting, or unable to keep medicine down    Weakness or dizziness    Vaginal discharge    Pain, redness, or swelling in the outer vaginal area (labia)  Date Last Reviewed: 10/1/2016    1336-5037 [employer]. [address] [place] [address]. All rights reserved. This information is not intended as a substitute for professional medical care. Always follow your healthcare  professional's instructions.

## 2020-06-10 NOTE — PROGRESS NOTES
"Hansa Ta is a 30 year old female who is being evaluated via a billable telephone visit.      The patient has been notified of following:     \"This telephone visit will be conducted via a call between you and your physician/provider. We have found that certain health care needs can be provided without the need for a physical exam.  This service lets us provide the care you need with a short phone conversation.  If a prescription is necessary we can send it directly to your pharmacy.  If lab work is needed we can place an order for that and you can then stop by our lab to have the test done at a later time.    Telephone visits are billed at different rates depending on your insurance coverage. During this emergency period, for some insurers they may be billed the same as an in-person visit.  Please reach out to your insurance provider with any questions.    If during the course of the call the physician/provider feels a telephone visit is not appropriate, you will not be charged for this service.\"    Patient has given verbal consent for Telephone visit?  Yes    What phone number would you like to be contacted at? [phone]    How would you like to obtain your AVS? Kristal Chase     Hansa Ta is a 30 year old female who presents via phone visit today for the following health issues: Dysuria, cloudy urine since this morning.    HPI   She states she has had UTI's in the past and these symptoms feel similar.  Denies fevers, chills, nausea, or vomiting. Denies abdominal pain or blood in her urine.     Denies pregnancy, LMP 3 days ago.    Patient Active Problem List   Diagnosis     Endometriosis     Ovarian cyst     CARDIOVASCULAR SCREENING; LDL GOAL LESS THAN 160     Post partum depression     Previous  delivery affecting pregnancy     SVEN (generalized anxiety disorder)     Iron deficiency anemia due to chronic blood loss     Adjustment disorder with depressed mood     Anxiety     Endometritis "     Panic attack     RUQ abdominal pain     Sacroiliac joint pain     Wound infection     Past Surgical History:   Procedure Laterality Date     APPENDECTOMY       C/SECTION, LOW TRANSVERSE       LAPAROSCOPY DIAGNOSTIC (GYN)      4     OTHER SURGICAL HISTORY      left ovary removed, for cysts       Social History     Tobacco Use     Smoking status: Never Smoker     Smokeless tobacco: Never Used   Substance Use Topics     Alcohol use: No     Family History   Problem Relation Age of Onset     Spine Problems Mother      Lung Cancer Father            Reviewed and updated as needed this visit by Provider    Review of Systems   Constitutional, HEENT, cardiovascular, pulmonary, gi and gu systems are negative, except as otherwise noted.       Objective   Reported vitals:  Denies fevers.  healthy, alert and no distress  PSYCH: Alert and oriented times 3; coherent speech, normal   rate and volume, able to articulate logical thoughts, able   to abstract reason, no tangential thoughts, no hallucinations   or delusions  Her affect is normal  RESP: No cough, no audible wheezing, able to talk in full sentences  Remainder of exam unable to be completed due to telephone visits    Assessment/Plan:  1. Urinary tract infection without hematuria, site unspecified  - nitroFURantoin macrocrystal-monohydrate (MACROBID) 100 MG capsule; Take 1 capsule (100 mg) by mouth 2 times daily for 5 days  Dispense: 10 capsule; Refill: 0  - phenazopyridine (PYRIDIUM) 100 MG tablet; Take 1 tablet (100 mg) by mouth 3 times daily as needed for urinary tract discomfort  Dispense: 6 tablet; Refill: 0  Patient states that she gets yeast infections after taking antibiotics and requests diflucan   - fluconazole (DIFLUCAN) 150 MG tablet; Take 1 tablet (150 mg) by mouth once for 1 dose  Dispense: 1 tablet; Refill: 0    See patient instructions    Encouraged Hansa to follow up with her PCP for further management of her BV/yeast infection symptoms including  possibly trying boric acid vaginal suppositories. We discussed with importance of keeping them out of reach from children as oral consumption can be very harmful.    Phone call duration:  14 minutes    Charity Mcintyre PA-C

## 2020-06-15 ENCOUNTER — VIRTUAL VISIT (OUTPATIENT)
Dept: URGENT CARE | Facility: CLINIC | Age: 30
End: 2020-06-15
Payer: OTHER GOVERNMENT

## 2020-06-15 ENCOUNTER — NURSE TRIAGE (OUTPATIENT)
Dept: NURSING | Facility: CLINIC | Age: 30
End: 2020-06-15

## 2020-06-15 DIAGNOSIS — R30.0 DYSURIA: Primary | ICD-10-CM

## 2020-06-15 RX ORDER — PHENAZOPYRIDINE HYDROCHLORIDE 100 MG/1
100 TABLET, FILM COATED ORAL 3 TIMES DAILY PRN
Qty: 9 TABLET | Refills: 0 | Status: SHIPPED | OUTPATIENT
Start: 2020-06-15 | End: 2020-06-18

## 2020-06-15 NOTE — TELEPHONE ENCOUNTER
"\"I had a virtual visit on 6/9 for a UTI and I was given an RX for   nitroFURantoin macrocrystal-monohydrate (MACROBID) 100 MG capsule  10 capsule  0  6/9/2020 6/14/2020  --    Sig - Route: Take 1 capsule (100 mg) by mouth 2 times daily for 5 days - Oral      \"I was feeling better, but today is the first day not being on it and I am still having burning when I pee.\" Per epic  No UA/UC was done, had a telephone appt. denies other sx. Triaged and advised an UC appt. Transferred to scheduling for an appt. Call back if needed.  Estefania Govea RN [facility]    COVID 19 Nurse Triage Plan/Patient Instructions    Please be aware that novel coronavirus (COVID-19) may be circulating in the community. If you develop symptoms such as fever, cough, or SOB or if you have concerns about the presence of another infection including coronavirus (COVID-19), please contact your health care provider or visit [email or web address].     Disposition/Instructions    Patient to schedule an In Person Visit with provider. Reference Visit Selection Guide.    Thank you for taking steps to prevent the spread of this virus.  o Limit your contact with others.  o Wear a simple mask to cover your cough.  o Wash your hands well and often.    Resources    [facility]: About COVID-19: [email or web address]    CDC: What to Do If You're Sick: www.cdc.gov/coronavirus/2019-ncov/about/steps-when-sick.html    CDC: Ending Home Isolation: www.cdc.gov/coronavirus/2019-ncov/hcp/disposition-in-home-patients.html     CDC: Caring for Someone: www.cdc.gov/coronavirus/2019-ncov/if-you-are-sick/care-for-someone.html     [facility]: Interim Guidance for Hospital Discharge to Home: www.health.[facility].mn.us/diseases/coronavirus/hcp/hospdischarge.pdf    [facility] clinical trials (COVID-19 research studies): [email or web address].[email or web address]/um-clinical-trials     Below are the COVID-19 hotlines at the [place] Department of Health ([facility]). Interpreters " are available.   o For health questions: Call 591-812-7580 or 1-300.391.1532 (7 a.m. to 7 p.m.)  o For questions about schools and childcare: Call 893-033-3248 or 1-581.872.3044 (7 a.m. to 7 p.m.)                      Additional Information    [1] Taking antibiotic > 72 hours (3 days) for UTI AND [2] painful urination or frequency not improved    Protocols used: URINARY TRACT INFECTION ON ANTIBIOTIC FOLLOW-UP CALL - FEMALE-A-CARLOS ALBERTO

## 2020-06-15 NOTE — PROGRESS NOTES
"Hansa Ta is a 30 year old female who is being evaluated via a billable telephone visit.  NO CHARGE VISIT.    The patient has been notified of following:     \"This telephone visit will be conducted via a call between you and your physician/provider. We have found that certain health care needs can be provided without the need for a physical exam.  This service lets us provide the care you need with a short phone conversation.  If a prescription is necessary we can send it directly to your pharmacy.  If lab work is needed we can place an order for that and you can then stop by our lab to have the test done at a later time.    Telephone visits are billed at different rates depending on your insurance coverage. During this emergency period, for some insurers they may be billed the same as an in-person visit.  Please reach out to your insurance provider with any questions.    If during the course of the call the physician/provider feels a telephone visit is not appropriate, you will not be charged for this service.\"    Patient has given verbal consent for Telephone visit?  Yes    What phone number would you like to be contacted at? [phone]    How would you like to obtain your AVS? Kristal Chase     Hansa Ta is a 30 year old female who presents via phone visit today for the following health issues: UTI symptoms-recurrent    HPI  Return of dysuria after completing course of Macrobid yesterday.       Reviewed and updated as needed this visit by Provider  Allergies         Review of Systems   Constitutional, HEENT, cardiovascular, pulmonary, gi and gu systems are negative, except as otherwise noted.      Assessment/Plan:  1. Dysuria  - phenazopyridine (PYRIDIUM) 100 MG tablet; Take 1 tablet (100 mg) by mouth 3 times daily as needed for urinary tract discomfort  Dispense: 9 tablet; Refill: 0    Discussed need for UA/UC in clinic. She will go to [facility] tomorrow for the urine tests. She " verbally expresses understanding and agrees to the plan.    Phone call duration:  2 minutes- No charge visit as I am referring her to be seen in person at our urgent care clinic.    Charity Mcintyre PA-C

## 2020-10-12 NOTE — PROGRESS NOTES
"Subjective     Hansa Ta is a 30 year old female who presents to clinic today for the following health issues:    HPI         ED/UC Followup:    Facility:  [facility]   Date of visit: 10/05/2020  Reason for visit: Gout     Patient was seen in urgent care on 10/5/2020 for pain affecting her right great toe. She had xray and labs done at that time. Xray was negative for fracture but her uric acid was 7.8. She states she has never had gout in the past. She was given a medrol dosepak. It helped the first day when she was taking a higher strength but then pain recurred over the course of the week. Almost feels worse now. Very sensitive. She does report doing a keto diet so has been eating more steak. Has really cut back on any alcohol intake as she is working on weight loss.       Would also like to discuss thyroid/possible diabetes. Patient reports since June she really has just felt not like herself. Has a hard time explaining this to me. Reports feeling more fatigued and just like she has no motivation. She states she is stress as her  will be deployed next month but states this is not anxiety or depression. She feels her moods have otherwise been really good. Has been working hard on eating better and getting more exercise but body just feels constantly exhausted.     Review of Systems   Constitutional, HEENT, cardiovascular, pulmonary, GI, , musculoskeletal, neuro, skin, endocrine and psych systems are negative, except as otherwise noted.      Objective    /66   Pulse 78   Temp 97.9  F (36.6  C)   Resp 14   Ht 1.626 m (5' 4\")   Wt 82.3 kg (181 lb 8 oz)   LMP 09/29/2020   Breastfeeding No   BMI 31.15 kg/m    Body mass index is 31.15 kg/m .  Physical Exam   GENERAL: healthy, alert and no distress  NECK: no adenopathy, no asymmetry, masses, or scars and thyroid normal to palpation  RESP: lungs clear to auscultation - no rales, rhonchi or wheezes  CV: regular rate and rhythm, normal S1 S2, " no S3 or S4, no murmur, click or rub, no peripheral edema and peripheral pulses strong  MS: No obvious deformity affecting the right great toe. Tenderness and mild edema affecting the MTP joint. Distal pulses intact.   SKIN: no suspicious lesions or rashes  PSYCH: mentation appears normal, affect normal/bright    Results for orders placed or performed in visit on 10/13/20 (from the past 24 hour(s))   Mononucleosis screen   Result Value Ref Range    Mononucleosis Screen Negative NEG^Negative           Assessment & Plan     Acute gout involving toe of right foot, unspecified cause  Will have patient completed a 5 day course of Prednisone at 40 mg. She will update me later in the week if not improving. We also discussed lifestyle and dietary changes to help decrease risk of recurrance.   - predniSONE (DELTASONE) 20 MG tablet; Take 2 tablets (40 mg) by mouth daily for 5 days    Other fatigue  Will order labs as below to further evaluate cause of her current symptoms. Patient does endorse stress but really feels this is not at all related to depression/anxiety. Follow-up pending lab results and progression of symptoms.   - CBC with platelets  - Comprehensive metabolic panel (BMP + Alb, Alk Phos, ALT, AST, Total. Bili, TP)  - TSH with free T4 reflex  - Vitamin D Deficiency  - Mononucleosis screen     The patient indicates understanding of these issues and agrees with the plan.    NGHIA Rodriguez [facility]

## 2020-10-13 ENCOUNTER — OFFICE VISIT (OUTPATIENT)
Dept: FAMILY MEDICINE | Facility: OTHER | Age: 30
End: 2020-10-13
Payer: OTHER GOVERNMENT

## 2020-10-13 VITALS
SYSTOLIC BLOOD PRESSURE: 106 MMHG | DIASTOLIC BLOOD PRESSURE: 66 MMHG | HEIGHT: 64 IN | WEIGHT: 181.5 LBS | BODY MASS INDEX: 30.99 KG/M2 | TEMPERATURE: 97.9 F | RESPIRATION RATE: 14 BRPM | HEART RATE: 78 BPM

## 2020-10-13 DIAGNOSIS — R53.83 OTHER FATIGUE: ICD-10-CM

## 2020-10-13 DIAGNOSIS — M10.9 ACUTE GOUT INVOLVING TOE OF RIGHT FOOT, UNSPECIFIED CAUSE: Primary | ICD-10-CM

## 2020-10-13 LAB
ALBUMIN SERPL-MCNC: 4.2 G/DL (ref 3.4–5)
ALP SERPL-CCNC: 75 U/L (ref 40–150)
ALT SERPL W P-5'-P-CCNC: 66 U/L (ref 0–50)
ANION GAP SERPL CALCULATED.3IONS-SCNC: 4 MMOL/L (ref 3–14)
AST SERPL W P-5'-P-CCNC: 23 U/L (ref 0–45)
BILIRUB SERPL-MCNC: 0.3 MG/DL (ref 0.2–1.3)
BUN SERPL-MCNC: 10 MG/DL (ref 7–30)
CALCIUM SERPL-MCNC: 9.1 MG/DL (ref 8.5–10.1)
CHLORIDE SERPL-SCNC: 103 MMOL/L (ref 94–109)
CO2 SERPL-SCNC: 30 MMOL/L (ref 20–32)
CREAT SERPL-MCNC: 0.76 MG/DL (ref 0.52–1.04)
ERYTHROCYTE [DISTWIDTH] IN BLOOD BY AUTOMATED COUNT: 12.7 % (ref 10–15)
GFR SERPL CREATININE-BSD FRML MDRD: >90 ML/MIN/{1.73_M2}
GLUCOSE SERPL-MCNC: 81 MG/DL (ref 70–99)
HCT VFR BLD AUTO: 40.6 % (ref 35–47)
HETEROPH AB SER QL: NEGATIVE
HGB BLD-MCNC: 13.5 G/DL (ref 11.7–15.7)
MCH RBC QN AUTO: 30.8 PG (ref 26.5–33)
MCHC RBC AUTO-ENTMCNC: 33.3 G/DL (ref 31.5–36.5)
MCV RBC AUTO: 93 FL (ref 78–100)
PLATELET # BLD AUTO: 202 10E9/L (ref 150–450)
POTASSIUM SERPL-SCNC: 4 MMOL/L (ref 3.4–5.3)
PROT SERPL-MCNC: 7.6 G/DL (ref 6.8–8.8)
RBC # BLD AUTO: 4.39 10E12/L (ref 3.8–5.2)
SODIUM SERPL-SCNC: 137 MMOL/L (ref 133–144)
TSH SERPL DL<=0.005 MIU/L-ACNC: 0.92 MU/L (ref 0.4–4)
WBC # BLD AUTO: 5.2 10E9/L (ref 4–11)

## 2020-10-13 PROCEDURE — 84443 ASSAY THYROID STIM HORMONE: CPT | Performed by: PHYSICIAN ASSISTANT

## 2020-10-13 PROCEDURE — 82306 VITAMIN D 25 HYDROXY: CPT | Performed by: PHYSICIAN ASSISTANT

## 2020-10-13 PROCEDURE — 85027 COMPLETE CBC AUTOMATED: CPT | Performed by: PHYSICIAN ASSISTANT

## 2020-10-13 PROCEDURE — 80053 COMPREHEN METABOLIC PANEL: CPT | Performed by: PHYSICIAN ASSISTANT

## 2020-10-13 PROCEDURE — 86308 HETEROPHILE ANTIBODY SCREEN: CPT | Performed by: PHYSICIAN ASSISTANT

## 2020-10-13 PROCEDURE — 99214 OFFICE O/P EST MOD 30 MIN: CPT | Performed by: PHYSICIAN ASSISTANT

## 2020-10-13 RX ORDER — PREDNISONE 20 MG/1
40 TABLET ORAL DAILY
Qty: 10 TABLET | Refills: 0 | Status: SHIPPED | OUTPATIENT
Start: 2020-10-13 | End: 2020-10-18

## 2020-10-13 ASSESSMENT — PAIN SCALES - GENERAL: PAINLEVEL: MODERATE PAIN (4)

## 2020-10-13 ASSESSMENT — MIFFLIN-ST. JEOR: SCORE: 1538.28

## 2020-10-14 LAB — DEPRECATED CALCIDIOL+CALCIFEROL SERPL-MC: 31 UG/L (ref 20–75)

## 2020-10-15 NOTE — RESULT ENCOUNTER NOTE
Please notify patient that all of her labs looked really good. There was no concern for diabetes, thyroid issues, viral infections or vitamin D deficiency.     Brigid Sahu PA-C

## 2020-10-20 ENCOUNTER — NURSE TRIAGE (OUTPATIENT)
Dept: FAMILY MEDICINE | Facility: OTHER | Age: 30
End: 2020-10-20

## 2020-10-20 DIAGNOSIS — M10.9 ACUTE GOUT INVOLVING TOE OF RIGHT FOOT, UNSPECIFIED CAUSE: Primary | ICD-10-CM

## 2020-10-20 RX ORDER — COLCHICINE 0.6 MG/1
0.6 TABLET ORAL DAILY
Qty: 30 TABLET | Refills: 1 | Status: SHIPPED | OUTPATIENT
Start: 2020-10-20 | End: 2020-12-30

## 2020-10-20 NOTE — TELEPHONE ENCOUNTER
Please notify patient I have sent Colchicine to the pharmacy for her. She should start with once daily. If not improving in about 5 days then have her increase to twice daily. Should continue this for 1-2 weeks then can try going off.    Brigid Sahu PA-C

## 2020-10-20 NOTE — TELEPHONE ENCOUNTER
Reason for call:  Patient reporting a symptom    Symptom or request: Symptom    Duration (how long have symptoms been present): Two weeks    Have you been treated for this before? Yes    Additional comments: Patient was seen on 10/13/20 with Brigid Sahu for gout in her toe and she finished her medication that Brigid Sahu prescribed and the gout is not better. Patient wants to know the next steps. Please call and advise    Phone Number patient can be reached at:  Cell number on file:    Telephone Information:   Mobile [phone]       Best Time:  Anytime     Can we leave a detailed message on this number:  YES    Call taken on 10/20/2020 at 11:59 AM by Judy Steward

## 2020-10-20 NOTE — TELEPHONE ENCOUNTER
"Spoke to patient. Per patient she felt about 80% better while on the prednisone and when she completed this her symptoms came back to what they were before. Patient has 3-10 pain, right great toe is sore to the touch, hard to bear weight on it. Patient denies any fevers, redness or streaking, severe swelling, or inability to move toe. Patient has been following recommendations given by provider and avoiding alcohol, foods such as seafood, asparagus, and sodas. Flagging for provider to review and advise.    Ratna Red RN      1. ONSET: \"When did the pain start?\"       Ongoing since UC visit on 10/05    2. LOCATION: \"Where is the pain located?\"       Right great toe    3. PAIN: \"How bad is the pain?\"    (Scale 1-10; or mild, moderate, severe)    -  MILD (1-3): doesn't interfere with normal activities     -  MODERATE (4-7): interferes with normal activities (e.g., work or school) or awakens from sleep, limping     -  SEVERE (8-10): excruciating pain, unable to do any normal activities, unable to walk      3-10    4. WORK OR EXERCISE: \"Has there been any recent work or exercise that involved this part of the body?\"       None    5. CAUSE: \"What do you think is causing the foot pain?\"      Gout    6. OTHER SYMPTOMS: \"Do you have any other symptoms?\" (e.g., leg pain, rash, fever, numbness)      None    7. PREGNANCY: \"Is there any chance you are pregnant?\" \"When was your last menstrual period?\"      Did not ask    Additional Information    Negative: Followed an ankle or foot injury    Negative: Ankle pain is the main symptom    Negative: Entire foot is cool or blue in comparison to other foot    Negative: Purple or black skin on foot or toe    Negative: Red area or streak and fever    Negative: Swollen foot and fever    Negative: Patient sounds very sick or weak to the triager    Negative: SEVERE pain (e.g., excruciating, unable to do any normal activities)    Negative: Looks like a boil, infected sore, deep ulcer, or " other infected rash (spreading redness, pus)    Negative: Swollen foot (EXCEPTIONs: localized bump from bunions, calluses, insect bite, sting)    Negative: Numbness in one foot (i.e., loss of sensation)    Negative: MODERATE pain (e.g., interferes with normal activities, limping) and present > 3 days    Negative: Numbness or tingling in feet and new or increased    Negative: Pain in the big toe joint    Negative: Patient wants to be seen    Protocols used: FOOT PAIN-A-[place]

## 2020-10-31 NOTE — PROGRESS NOTES
"Subjective     Hansa Ta is a 30 year old female who presents to clinic today for the following health issues:    History of Present Illness       She eats 2-3 servings of fruits and vegetables daily.She consumes 0 sweetened beverage(s) daily.She exercises with enough effort to increase her heart rate 20 to 29 minutes per day.  She exercises with enough effort to increase her heart rate 3 or less days per week.   She is taking medications regularly.     Answers for HPI/ROS submitted by the patient on 11/3/2020   Chronic problems general questions HPI Form  If you checked off any problems, how difficult have these problems made it for you to do your work, take care of things at home, or get along with other people?: Very difficult  PHQ9 TOTAL SCORE: 10  SVEN 7 TOTAL SCORE: 6    ED/UC Followup:    Facility:  [facility]  Date of visit: 10/22/2020  Reason for visit: right sided neck pain/swollen lymph nodes  Current Status: all over lymph nodes are swollen intermittently.      Patient was seen in the urgent care on 10/22 for swelling on the right side of her neck. A CBC was done and this was normal. She reports this has been occurring intermittently in her neck and axillary area. She was started on a course of antibiotics for possible parotiditis. She reports things have been a bit better. No abnormal fevers. Does get warmer at night but not sweating. No stomach pain. Slight runny nose but attributes this to crying. Has been more stress.  left Sunday for active duty. Will likely be gone 1 year.     Would also like dermatology referral to remove a mole on her right nare.    Review of Systems   Constitutional, HEENT, cardiovascular, pulmonary, gi and gu systems are negative, except as otherwise noted.      Objective    /64   Pulse 84   Temp 96.7  F (35.9  C) (Temporal)   Resp 16   Ht 1.626 m (5' 4\")   Wt 84.8 kg (187 lb)   LMP 10/30/2020 (Approximate)   SpO2 98%   BMI 32.10 kg/m  "   Body mass index is 32.1 kg/m .  Physical Exam   GENERAL: healthy, alert and no distress  HENT: ear canals and TM's normal, nose and mouth without ulcers or lesions  NECK: no adenopathy, no asymmetry, masses, or scars and thyroid normal to palpation  RESP: lungs clear to auscultation - no rales, rhonchi or wheezes  CV: regular rate and rhythm, normal S1 S2, no S3 or S4, no murmur, click or rub, no peripheral edema and peripheral pulses strong  ABDOMEN: soft, nontender, no hepatosplenomegaly, no masses and bowel sounds normal  SKIN: no suspicious lesions or rashes  PSYCH: mentation appears normal, affect normal/bright    No results found for this or any previous visit (from the past 24 hour(s)).        Assessment & Plan     Lymphadenopathy  Overall symptoms seem to be improving. Suspect stress induced/reactive lymphadenopathy. No other concerning symptoms present. Patient will monitor over the next 2 weeks. She will call if not resolved by then and will order additional labs/imaging.     Atypical nevi  - DERMATOLOGY ADULT REFERRAL; Future     The patient indicates understanding of these issues and agrees with the plan.    NGHIA Rodriguez [facility]

## 2020-11-03 ENCOUNTER — OFFICE VISIT (OUTPATIENT)
Dept: FAMILY MEDICINE | Facility: OTHER | Age: 30
End: 2020-11-03
Payer: OTHER GOVERNMENT

## 2020-11-03 VITALS
HEART RATE: 84 BPM | DIASTOLIC BLOOD PRESSURE: 64 MMHG | RESPIRATION RATE: 16 BRPM | WEIGHT: 187 LBS | OXYGEN SATURATION: 98 % | BODY MASS INDEX: 31.92 KG/M2 | TEMPERATURE: 96.7 F | HEIGHT: 64 IN | SYSTOLIC BLOOD PRESSURE: 104 MMHG

## 2020-11-03 DIAGNOSIS — R59.1 LYMPHADENOPATHY: Primary | ICD-10-CM

## 2020-11-03 DIAGNOSIS — D22.9 ATYPICAL NEVI: ICD-10-CM

## 2020-11-03 PROCEDURE — 99213 OFFICE O/P EST LOW 20 MIN: CPT | Performed by: PHYSICIAN ASSISTANT

## 2020-11-03 SDOH — HEALTH STABILITY: MENTAL HEALTH: HOW OFTEN DO YOU HAVE A DRINK CONTAINING ALCOHOL?: 2-3 TIMES A WEEK

## 2020-11-03 SDOH — HEALTH STABILITY: MENTAL HEALTH: HOW OFTEN DO YOU HAVE 6 OR MORE DRINKS ON ONE OCCASION?: NOT ASKED

## 2020-11-03 SDOH — HEALTH STABILITY: MENTAL HEALTH: HOW MANY STANDARD DRINKS CONTAINING ALCOHOL DO YOU HAVE ON A TYPICAL DAY?: NOT ASKED

## 2020-11-03 ASSESSMENT — ANXIETY QUESTIONNAIRES
GAD7 TOTAL SCORE: 6
7. FEELING AFRAID AS IF SOMETHING AWFUL MIGHT HAPPEN: NOT AT ALL
5. BEING SO RESTLESS THAT IT IS HARD TO SIT STILL: SEVERAL DAYS
GAD7 TOTAL SCORE: 6
GAD7 TOTAL SCORE: 6
6. BECOMING EASILY ANNOYED OR IRRITABLE: SEVERAL DAYS
3. WORRYING TOO MUCH ABOUT DIFFERENT THINGS: SEVERAL DAYS
4. TROUBLE RELAXING: SEVERAL DAYS
1. FEELING NERVOUS, ANXIOUS, OR ON EDGE: MORE THAN HALF THE DAYS
7. FEELING AFRAID AS IF SOMETHING AWFUL MIGHT HAPPEN: NOT AT ALL
2. NOT BEING ABLE TO STOP OR CONTROL WORRYING: NOT AT ALL

## 2020-11-03 ASSESSMENT — PATIENT HEALTH QUESTIONNAIRE - PHQ9
SUM OF ALL RESPONSES TO PHQ QUESTIONS 1-9: 10
10. IF YOU CHECKED OFF ANY PROBLEMS, HOW DIFFICULT HAVE THESE PROBLEMS MADE IT FOR YOU TO DO YOUR WORK, TAKE CARE OF THINGS AT HOME, OR GET ALONG WITH OTHER PEOPLE: VERY DIFFICULT
SUM OF ALL RESPONSES TO PHQ QUESTIONS 1-9: 10

## 2020-11-03 ASSESSMENT — MIFFLIN-ST. JEOR: SCORE: 1563.23

## 2020-11-04 ASSESSMENT — PATIENT HEALTH QUESTIONNAIRE - PHQ9: SUM OF ALL RESPONSES TO PHQ QUESTIONS 1-9: 10

## 2020-11-04 ASSESSMENT — ANXIETY QUESTIONNAIRES: GAD7 TOTAL SCORE: 6

## 2020-11-09 ENCOUNTER — TELEPHONE (OUTPATIENT)
Dept: DERMATOLOGY | Facility: CLINIC | Age: 30
End: 2020-11-09

## 2020-11-09 NOTE — TELEPHONE ENCOUNTER
Will route to care team  to assist with scheduling a phone visit with photos. Thank you..Chinyere Fairbanks RN

## 2020-11-09 NOTE — TELEPHONE ENCOUNTER
[facility] Call Center    Phone Message    May a detailed message be left on voicemail: yes     Reason for Call: Question regarding specialist protocol  Please follow protocols- only utilize this documentation for questions or concerns that are not clear in the protocol.  Contact clinic directly to clarify question(s) via phone or [employer] message.   Was Clinic Available: no  Question regarding protocol:  Melanocytic nevi  Is there a referral for the requested specialist/specialty? no  Name of referring provider: Brigid Sahu PA-C in  FAMILY PRACTICE  Location of referring provider: [facility] [place]    Diagnosis not listed in protocols, require clinic review prior to scheduling    Action Taken: Message routed to:  Other: [facility]    Travel Screening: Not Applicable

## 2020-11-10 NOTE — TELEPHONE ENCOUNTER
This appointment has been scheduled.    Chinyere Maxwell  Surgical Specialties Procedure   [facility]  11/10/2020 9:34 AM

## 2020-11-23 ENCOUNTER — NURSE TRIAGE (OUTPATIENT)
Dept: FAMILY MEDICINE | Facility: OTHER | Age: 30
End: 2020-11-23

## 2020-11-23 NOTE — TELEPHONE ENCOUNTER
"Reason for Call:  Other appointment    Detailed comments: Patient scheduled for a face to face via [facility] for \"Follow up of the swollen lymph nodes and chest pain\". Please  Review.     Phone Number Patient can be reached at: Home number on file [phone] (home)    Best Time: any    Can we leave a detailed message on this number? YES    Call taken on 11/23/2020 at 7:00 AM by Donis Seymour      "

## 2020-11-23 NOTE — TELEPHONE ENCOUNTER
Called to discuss with the patient. The patient stated that she is feeling much better. She had anxiety and will call back if she needs an appointment.   Denies SOB, chest pain, or any other symptoms at the time.     Gee Rehman RN  November 23, 2020

## 2020-11-25 NOTE — PROGRESS NOTES
Subjective     Hansa Ta is a 30 year old female who presents to clinic today for the following health issues:    History of Present Illness       She eats 2-3 servings of fruits and vegetables daily.She consumes 1 sweetened beverage(s) daily.She exercises with enough effort to increase her heart rate 9 or less minutes per day.  She exercises with enough effort to increase her heart rate 3 or less days per week.   She is taking medications regularly.           Anxiety Follow-Up    How are you doing with your anxiety since your last visit? Improved.    Are you having other symptoms that might be associated with anxiety? No    Have you had a significant life event?Yes.  is deployed.     Are you feeling depressed? No    Do you have any concerns with your use of alcohol or other drugs? No.     Patient presents today stating she initially made this visit last week when she was having intermittent palpitations. She reports these started out of the blue - by the time her appointment was here it had stopped so she cancelled at that time. Symptoms occurred the next day. She reports feeling it at rest. No change with activity. Cannot identify any triggers. She denies chest pain, shortness of breath or headaches. She does feel she has been a bit more overwhelmed. Having hard time concentrating on school work. Anxiety is really high at times.  is deployed so doing everything herself. She states she does have a really good support system. Denies any depression symptoms.     Social History     Tobacco Use     Smoking status: Never Smoker     Smokeless tobacco: Never Used   Substance Use Topics     Alcohol use: Yes     Frequency: 2-3 times a week     Comment: 2-3 per week      Drug use: No     SVEN-7 SCORE 8/6/2019 11/3/2020 12/1/2020   Total Score 8 (mild anxiety) 6 (mild anxiety) 9 (mild anxiety)   Total Score 8 6 9     PHQ 8/6/2019 11/3/2020 12/1/2020   PHQ-9 Total Score 4 10 8   Q9: Thoughts of better off  "dead/self-harm past 2 weeks Not at all Not at all Not at all     Review of Systems   Constitutional, HEENT, cardiovascular, pulmonary, gi and gu systems are negative, except as otherwise noted.      Objective    /62   Pulse 77   Temp 97.9  F (36.6  C) (Temporal)   Resp 18   Ht 1.626 m (5' 4\")   Wt 87.1 kg (192 lb)   SpO2 100%   BMI 32.96 kg/m    Body mass index is 32.96 kg/m .  Physical Exam   GENERAL: healthy, alert and no distress  RESP: lungs clear to auscultation - no rales, rhonchi or wheezes  CV: regular rate and rhythm, normal S1 S2, no S3 or S4, no murmur, click or rub, no peripheral edema and peripheral pulses strong  SKIN: no suspicious lesions or rashes  PSYCH: mentation appears normal, affect normal/bright, tearful and anxious    No results found for this or any previous visit (from the past 24 hour(s)).        Assessment & Plan     SVEN (generalized anxiety disorder)  We spent time discussing that her symptoms of heart palpitations are likely related to anxiety. She does endorse this has been higher and making concentration much more difficult. She does have a great support system and does not feel that therapy would be helpful. We discussed medication today and patient is agreeable to this. Appropriate use and side effects discussed. Will have patient follow-up in 4-6 weeks, sooner if needed.   - FLUoxetine (PROZAC) 20 MG capsule; Take 1 capsule (20 mg) by mouth daily    Anxiety  - FLUoxetine (PROZAC) 20 MG capsule; Take 1 capsule (20 mg) by mouth daily    Palpitations     The patient indicates understanding of these issues and agrees with the plan.    Brigid Sahu PA-C  [facility]    Answers for HPI/ROS submitted by the patient on 12/1/2020   Chronic problems general questions HPI Form  If you checked off any problems, how difficult have these problems made it for you to do your work, take care of things at home, or get along with other people?: Somewhat " difficult  PHQ9 TOTAL SCORE: 8  SVEN 7 TOTAL SCORE: 9

## 2020-12-01 ENCOUNTER — OFFICE VISIT (OUTPATIENT)
Dept: FAMILY MEDICINE | Facility: OTHER | Age: 30
End: 2020-12-01
Payer: OTHER GOVERNMENT

## 2020-12-01 VITALS
HEIGHT: 64 IN | BODY MASS INDEX: 32.78 KG/M2 | OXYGEN SATURATION: 100 % | DIASTOLIC BLOOD PRESSURE: 62 MMHG | HEART RATE: 77 BPM | RESPIRATION RATE: 18 BRPM | TEMPERATURE: 97.9 F | WEIGHT: 192 LBS | SYSTOLIC BLOOD PRESSURE: 114 MMHG

## 2020-12-01 DIAGNOSIS — F41.1 GAD (GENERALIZED ANXIETY DISORDER): Primary | ICD-10-CM

## 2020-12-01 DIAGNOSIS — R00.2 PALPITATIONS: ICD-10-CM

## 2020-12-01 DIAGNOSIS — F41.9 ANXIETY: ICD-10-CM

## 2020-12-01 PROCEDURE — 99214 OFFICE O/P EST MOD 30 MIN: CPT | Performed by: PHYSICIAN ASSISTANT

## 2020-12-01 ASSESSMENT — ANXIETY QUESTIONNAIRES
7. FEELING AFRAID AS IF SOMETHING AWFUL MIGHT HAPPEN: SEVERAL DAYS
5. BEING SO RESTLESS THAT IT IS HARD TO SIT STILL: SEVERAL DAYS
GAD7 TOTAL SCORE: 9
3. WORRYING TOO MUCH ABOUT DIFFERENT THINGS: SEVERAL DAYS
2. NOT BEING ABLE TO STOP OR CONTROL WORRYING: MORE THAN HALF THE DAYS
GAD7 TOTAL SCORE: 9
4. TROUBLE RELAXING: SEVERAL DAYS
6. BECOMING EASILY ANNOYED OR IRRITABLE: SEVERAL DAYS
7. FEELING AFRAID AS IF SOMETHING AWFUL MIGHT HAPPEN: SEVERAL DAYS
1. FEELING NERVOUS, ANXIOUS, OR ON EDGE: MORE THAN HALF THE DAYS
GAD7 TOTAL SCORE: 9

## 2020-12-01 ASSESSMENT — MIFFLIN-ST. JEOR: SCORE: 1585.91

## 2020-12-01 ASSESSMENT — PATIENT HEALTH QUESTIONNAIRE - PHQ9
SUM OF ALL RESPONSES TO PHQ QUESTIONS 1-9: 8
10. IF YOU CHECKED OFF ANY PROBLEMS, HOW DIFFICULT HAVE THESE PROBLEMS MADE IT FOR YOU TO DO YOUR WORK, TAKE CARE OF THINGS AT HOME, OR GET ALONG WITH OTHER PEOPLE: SOMEWHAT DIFFICULT
SUM OF ALL RESPONSES TO PHQ QUESTIONS 1-9: 8

## 2020-12-02 ASSESSMENT — ANXIETY QUESTIONNAIRES: GAD7 TOTAL SCORE: 9

## 2020-12-02 ASSESSMENT — PATIENT HEALTH QUESTIONNAIRE - PHQ9: SUM OF ALL RESPONSES TO PHQ QUESTIONS 1-9: 8

## 2020-12-20 ENCOUNTER — HEALTH MAINTENANCE LETTER (OUTPATIENT)
Age: 30
End: 2020-12-20

## 2020-12-28 NOTE — PROGRESS NOTES
Subjective     Hansa Ta is a 30 year old female who presents to clinic today for the following health issues:    HPI         Anxiety Follow-Up    How are you doing with your anxiety since your last visit? No change    Are you having other symptoms that might be associated with anxiety? Yes:  trouble sleeping, fatigued    Have you had a significant life event? No     Are you feeling depressed? No    Do you have any concerns with your use of alcohol or other drugs? No     Patient presents today for follow-up of anxiety. She was started on Prozac about 1 month ago. Not really sure if she has noticed much but has not been having the chest pain she was experiencing prior to starting medication. She reports she otherwise has been tolerating this well without side effects. Biggest concern is that she just cannot sleep. Feels that she cannot turn her mind off. Then feels more fatigued and irritable with poorer concentration during the day.     Also notes she has been having some irregular vaginal discharge off and on. Tends to go back and forth between having a yeast infection and BV. With period last week it was very painful to place a tampon. It has never been like this before. Does seem a little better now.      Social History     Tobacco Use     Smoking status: Never Smoker     Smokeless tobacco: Never Used   Substance Use Topics     Alcohol use: Yes     Frequency: 2-3 times a week     Comment: 2-3 per week      Drug use: No     SVEN-7 SCORE 11/3/2020 12/1/2020 12/30/2020   Total Score 6 (mild anxiety) 9 (mild anxiety) -   Total Score 6 9 16     PHQ 11/3/2020 12/1/2020 12/30/2020   PHQ-9 Total Score 10 8 12   Q9: Thoughts of better off dead/self-harm past 2 weeks Not at all Not at all Not at all     Review of Systems   Constitutional, HEENT, cardiovascular, pulmonary, gi and gu systems are negative, except as otherwise noted.      Objective    /72   Pulse 70   Temp 98.1  F (36.7  C) (Temporal)   Resp 14   " Ht 1.651 m (5' 5\")   Wt 86.2 kg (190 lb)   LMP 12/24/2020   BMI 31.62 kg/m    Body mass index is 31.62 kg/m .  Physical Exam   GENERAL: healthy, alert and no distress  SKIN: no suspicious lesions or rashes  PSYCH: mentation appears normal, affect normal/bright    Results for orders placed or performed in visit on 12/30/20   Wet prep     Status: None    Specimen: Vagina   Result Value Ref Range    Specimen Description Vagina     Wet Prep No Trichomonas seen     Wet Prep No clue cells seen     Wet Prep No yeast seen     Wet Prep Few  PMNs seen            Assessment & Plan     SVEN (generalized anxiety disorder)  Patient has noticed maybe mild improvement in anxiety since starting the Prozac. She is well tolerating this without side effects. Discussed trial of increasing dose to 40 mg. She reports sleep has been the most bothersome. Will have patient start Trazodone at bedtime. Discussed increasing the dose depending on response. Side effects reviewed. Discussed certainly can change medication if this does not seem to be effective.   - FLUoxetine (PROZAC) 40 MG capsule; Take 1 capsule (40 mg) by mouth daily  - traZODone (DESYREL) 50 MG tablet; Take 1-4 tablets ( mg) by mouth At Bedtime    Insomnia, unspecified type  See plan above.   - traZODone (DESYREL) 50 MG tablet; Take 1-4 tablets ( mg) by mouth At Bedtime    Vaginal irritation  Wet prep normal today. We discussed starting a probiotic for vaginal health as well as other supportive cares.   - Wet prep     The patient indicates understanding of these issues and agrees with the plan.    Brigid Sahu PA-C  [facility]    "

## 2020-12-30 ENCOUNTER — OFFICE VISIT (OUTPATIENT)
Dept: FAMILY MEDICINE | Facility: CLINIC | Age: 30
End: 2020-12-30
Payer: OTHER GOVERNMENT

## 2020-12-30 VITALS
DIASTOLIC BLOOD PRESSURE: 72 MMHG | WEIGHT: 190 LBS | SYSTOLIC BLOOD PRESSURE: 100 MMHG | TEMPERATURE: 98.1 F | HEIGHT: 65 IN | BODY MASS INDEX: 31.65 KG/M2 | RESPIRATION RATE: 14 BRPM | HEART RATE: 70 BPM

## 2020-12-30 DIAGNOSIS — N89.8 VAGINAL IRRITATION: ICD-10-CM

## 2020-12-30 DIAGNOSIS — F41.1 GAD (GENERALIZED ANXIETY DISORDER): Primary | ICD-10-CM

## 2020-12-30 DIAGNOSIS — G47.00 INSOMNIA, UNSPECIFIED TYPE: ICD-10-CM

## 2020-12-30 LAB
SPECIMEN SOURCE: NORMAL
WET PREP SPEC: NORMAL

## 2020-12-30 PROCEDURE — 99214 OFFICE O/P EST MOD 30 MIN: CPT | Performed by: PHYSICIAN ASSISTANT

## 2020-12-30 PROCEDURE — 87210 SMEAR WET MOUNT SALINE/INK: CPT | Performed by: PHYSICIAN ASSISTANT

## 2020-12-30 RX ORDER — FLUOXETINE 40 MG/1
40 CAPSULE ORAL DAILY
Qty: 90 CAPSULE | Refills: 0 | Status: SHIPPED | OUTPATIENT
Start: 2020-12-30 | End: 2021-03-05

## 2020-12-30 RX ORDER — TRAZODONE HYDROCHLORIDE 50 MG/1
50-200 TABLET, FILM COATED ORAL AT BEDTIME
Qty: 90 TABLET | Refills: 0 | Status: SHIPPED | OUTPATIENT
Start: 2020-12-30 | End: 2021-09-13

## 2020-12-30 ASSESSMENT — ANXIETY QUESTIONNAIRES
5. BEING SO RESTLESS THAT IT IS HARD TO SIT STILL: NEARLY EVERY DAY
1. FEELING NERVOUS, ANXIOUS, OR ON EDGE: NEARLY EVERY DAY
GAD7 TOTAL SCORE: 16
IF YOU CHECKED OFF ANY PROBLEMS ON THIS QUESTIONNAIRE, HOW DIFFICULT HAVE THESE PROBLEMS MADE IT FOR YOU TO DO YOUR WORK, TAKE CARE OF THINGS AT HOME, OR GET ALONG WITH OTHER PEOPLE: VERY DIFFICULT
2. NOT BEING ABLE TO STOP OR CONTROL WORRYING: SEVERAL DAYS
3. WORRYING TOO MUCH ABOUT DIFFERENT THINGS: SEVERAL DAYS
7. FEELING AFRAID AS IF SOMETHING AWFUL MIGHT HAPPEN: MORE THAN HALF THE DAYS
6. BECOMING EASILY ANNOYED OR IRRITABLE: NEARLY EVERY DAY

## 2020-12-30 ASSESSMENT — PATIENT HEALTH QUESTIONNAIRE - PHQ9
SUM OF ALL RESPONSES TO PHQ QUESTIONS 1-9: 12
5. POOR APPETITE OR OVEREATING: NEARLY EVERY DAY

## 2020-12-30 ASSESSMENT — MIFFLIN-ST. JEOR: SCORE: 1592.71

## 2020-12-30 NOTE — PATIENT INSTRUCTIONS
Florastor probiotic   - lactobacillus acidophilus    Increase Prozac to 40 mg    Start Trazodone to bedtime   - Can take 1-6 tablets at bedtime as needed

## 2020-12-31 ASSESSMENT — ANXIETY QUESTIONNAIRES: GAD7 TOTAL SCORE: 16

## 2021-01-08 ENCOUNTER — TELEPHONE (OUTPATIENT)
Dept: DERMATOLOGY | Facility: CLINIC | Age: 31
End: 2021-01-08

## 2021-01-08 NOTE — TELEPHONE ENCOUNTER
[facility] Call Center    Phone Message    May a detailed message be left on voicemail: yes     Reason for Call: The patient returned a call regarding 1/13/21 appointment. Please advise. Thank you.    Action Taken: Message routed to:  Adult Clinics: Dermatology p [phone]    Travel Screening: Not Applicable

## 2021-01-12 ENCOUNTER — OFFICE VISIT (OUTPATIENT)
Dept: DERMATOLOGY | Facility: CLINIC | Age: 31
End: 2021-01-12
Attending: PHYSICIAN ASSISTANT
Payer: OTHER GOVERNMENT

## 2021-01-12 DIAGNOSIS — D22.39 FIBROUS PAPULE OF NOSE: Primary | ICD-10-CM

## 2021-01-12 PROCEDURE — 99202 OFFICE O/P NEW SF 15 MIN: CPT | Performed by: DERMATOLOGY

## 2021-01-12 ASSESSMENT — PAIN SCALES - GENERAL: PAINLEVEL: NO PAIN (0)

## 2021-01-12 NOTE — NURSING NOTE
Hansa Ta's goals for this visit include:   Chief Complaint   Patient presents with     Spot check     Areas of concern on the nose. No personal or family hx of SC.     She requests these members of her care team be copied on today's visit information:     PCP: No Ref-Primary, Physician    Referring Provider:  Brigid Sahu PA-C  [address] [place] [address]    [facility] 12/24/2020     Do you need any medication refills at today's visit? No    Judith Foley LPN

## 2021-01-12 NOTE — LETTER
1/12/2021         RE: Hansa Ta  [address] [address] [place] [id number]        Dear Colleague,    Thank you for referring your patient, Hansa aT, to the [facility]. Please see a copy of my visit note below.    [facility] Dermatology Note  Encounter Date: Jan 12, 2021  Office Visit     Dermatology Problem List:  1. Fibrous papule, right ala    Family history: No family hx of Skin Cancer.  ____________________________________________    Assessment & Plan:  # Fibrous papule on the right nasal ala. Chronic stable problem. No significantly symptomatic at present.  - Reassured of benign nature.  - Monitor: Patient to continue monitoring at home and will contact the clinic for any changes.   - Discussed removal with shave biopsy. I recommended against as scar could be atrophic and have worse cosmetic outcome. These can also recur after shave removal. If lesion bleeds spontaneously and this becomes bothersome, then to call for appointment to remove.    Procedures Performed:   None    Follow-up: prn for new or changing lesions.    Staff and Scribe:    Judith Foley LPN    Provider Disclosure:   The documentation recorded by the scribe accurately reflects the services I personally performed and the decisions made by me.    Anupama Cedillo MD    Department of Dermatology  [facility]: Phone: [phone], Fax:[phone]  [facility]: Phone: [phone], Fax: [phone]    ____________________________________________    CC: Spot check (Areas of concern on the nose. No personal or family hx of SC.)      HPI:  Ms. Hansa Ta is a(n) 30 year old female who presents today as a new patient for skin check.    She is new to our department. She has not seen a dermatologist prior. She has no history of skin cancer, atypical moles, or  "precancerous lesions to her knowledge.    Referred by PCP on 11/3/20 for \"atypical nevi.\" Reviewed note. Mentions mole on right nare that patient would like removed. No documentation of other concerning skin lesions.    Today, she notes concerns about her nose. This is a skin color spot that has been present for some time. It gets irritated occasionally. Has never bled spontaneously. Did not start as a brown spot. Mostly bothers her cosmetically.    Patient is otherwise feeling well, without additional concerns.    Labs:  None reviewed.    Physical Exam:  Vitals: LMP 2020   SKIN: Focused examination of Face was performed.  - Andrade Skin Type II  - Fibrous papule to the right nasal ala.  - No other lesions of concern on areas examined.     Medications:  Current Outpatient Medications   Medication     fluconazole (DIFLUCAN) 150 MG tablet     FLUoxetine (PROZAC) 40 MG capsule     nitroFURantoin macrocrystal-monohydrate (MACROBID) 100 MG capsule     traZODone (DESYREL) 50 MG tablet     No current facility-administered medications for this visit.       Past Medical/Surgical History:   Patient Active Problem List   Diagnosis     Endometriosis     Ovarian cyst     CARDIOVASCULAR SCREENING; LDL GOAL LESS THAN 160     Post partum depression     Previous  delivery affecting pregnancy     SVEN (generalized anxiety disorder)     Iron deficiency anemia due to chronic blood loss     Adjustment disorder with depressed mood     Anxiety     Endometritis     Panic attack     RUQ abdominal pain     Sacroiliac joint pain     Wound infection     Past Medical History:   Diagnosis Date     Endometriosis      Ovarian cyst        CC Brigid Sahu PA-C  [address] [place] [address] on close of this encounter.        Again, thank you for allowing me to participate in the care of your patient.        Sincerely,        Anupama Cedillo MD    "

## 2021-01-12 NOTE — PROGRESS NOTES
"[facility] Dermatology Note  Encounter Date: Jan 12, 2021  Office Visit     Dermatology Problem List:  1. Fibrous papule, right ala    Family history: No family hx of Skin Cancer.  ____________________________________________    Assessment & Plan:  # Fibrous papule on the right nasal ala. Chronic stable problem. No significantly symptomatic at present.  - Reassured of benign nature.  - Monitor: Patient to continue monitoring at home and will contact the clinic for any changes.   - Discussed removal with shave biopsy. I recommended against as scar could be atrophic and have worse cosmetic outcome. These can also recur after shave removal. If lesion bleeds spontaneously and this becomes bothersome, then to call for appointment to remove.    Procedures Performed:   None    Follow-up: prn for new or changing lesions.    Staff and Scribe:    Judith Foley LPN    Provider Disclosure:   The documentation recorded by the scribe accurately reflects the services I personally performed and the decisions made by me.    Anupama Cedillo MD    Department of Dermatology  [facility]: Phone: [phone], Fax:[phone]  [facility]: Phone: [phone], Fax: [phone]    ____________________________________________    CC: Spot check (Areas of concern on the nose. No personal or family hx of SC.)      HPI:  Ms. Hansa Ta is a(n) 30 year old female who presents today as a new patient for skin check.    She is new to our department. She has not seen a dermatologist prior. She has no history of skin cancer, atypical moles, or precancerous lesions to her knowledge.    Referred by PCP on 11/3/20 for \"atypical nevi.\" Reviewed note. Mentions mole on right nare that patient would like removed. No documentation of other concerning skin lesions.    Today, she notes concerns about her " nose. This is a skin color spot that has been present for some time. It gets irritated occasionally. Has never bled spontaneously. Did not start as a brown spot. Mostly bothers her cosmetically.    Patient is otherwise feeling well, without additional concerns.    Labs:  None reviewed.    Physical Exam:  Vitals: LMP 2020   SKIN: Focused examination of Face was performed.  - Andrade Skin Type II  - Fibrous papule to the right nasal ala.  - No other lesions of concern on areas examined.     Medications:  Current Outpatient Medications   Medication     fluconazole (DIFLUCAN) 150 MG tablet     FLUoxetine (PROZAC) 40 MG capsule     nitroFURantoin macrocrystal-monohydrate (MACROBID) 100 MG capsule     traZODone (DESYREL) 50 MG tablet     No current facility-administered medications for this visit.       Past Medical/Surgical History:   Patient Active Problem List   Diagnosis     Endometriosis     Ovarian cyst     CARDIOVASCULAR SCREENING; LDL GOAL LESS THAN 160     Post partum depression     Previous  delivery affecting pregnancy     SVEN (generalized anxiety disorder)     Iron deficiency anemia due to chronic blood loss     Adjustment disorder with depressed mood     Anxiety     Endometritis     Panic attack     RUQ abdominal pain     Sacroiliac joint pain     Wound infection     Past Medical History:   Diagnosis Date     Endometriosis      Ovarian cyst        CC Brigid Sahu PA-C  [address] [place] [address] on close of this encounter.

## 2021-03-04 NOTE — PROGRESS NOTES
Hansa is a 30 year old who is being evaluated via a billable telephone visit.      What phone number would you like to be contacted at? [phone]  How would you like to obtain your AVS? Kristal Chase   Hansa is a 30 year old who presents for the following health issues     HPI       Back Pain  Onset/Duration: 2 weeks  Description:   Location of pain: low back left and middle of back left  Character of pain: sharp and tingling dull   Pain radiation: radiates into the left leg  New numbness or weakness in legs, not attributed to pain: YES  Intensity: Currently 3/10  Progression of Symptoms: same  History:   Specific cause: slipped on ice- didn't fall but caught herself  Pain interferes with job: not applicable  History of back problems:   Any previous MRI or X-rays: Yes--at urgent care.  Date 2 weeks ago  Sees a specialist for back pain: No  Alleviating factors:   Improved by: biofreeze, stretching, light exercise    Precipitating factors:  Worsened by: Bending and laying flat  Therapies tried and outcome: biofreeze, chiropractor and NSAIDs    Sleipped and fell on left side - not a lot of pain but was bruised, then tweeked back with carrying son, went to chiro - felt better, really struggling with picking up her son. He's about 36 pounds. Couple weeks she could not even pick him up. Feels similar to when L5 issue after . Was seen in urgent care and had xray's done - no concern for fracture. Last couple days have been a bit better. Going to the gym but very light exercise. If laying flat back will spasm. Bending over hurts. Headahces around same timeline. Not sure if before or after the fall. Pain starts in the jaw to her temple to between her eyes. Every day for a week and half. Achy/pressure. Light sensitive. Mild relief from mild sinus medication. She does report a steroid burst helped with symptoms regarding her back the last time.     Headaches x 1 month - pain starts in the jaw goes to the   temple and then  between eyes- getting headaches daily, using tylenol and sinus (thought is was her sinuses)- does not help  Light makes headaches worse  No vision changes    Review of Systems   Constitutional, HEENT, cardiovascular, pulmonary, gi and gu systems are negative, except as otherwise noted.      Objective           Vitals:  No vitals were obtained today due to virtual visit.    Physical Exam   healthy, alert and no distress  PSYCH: Alert and oriented times 3; coherent speech, normal   rate and volume, able to articulate logical thoughts, able   to abstract reason, no tangential thoughts, no hallucinations   or delusions  Her affect is normal and pleasant  RESP: No cough, no audible wheezing, able to talk in full sentences  Remainder of exam unable to be completed due to telephone visits    Assessment & Plan     Lumbar back pain  Patient with several small injuries that likely all compiled and created current symptoms. She was seen in urgent care and xray images taken with no concern for fracture. She has previously done well with steroids. Will restart taper today. Encouraged gentle stretching, ice/heat and tylenol/ibuprofen as needed. Patient will follow-up in clinic if new symptoms develop or current symptoms fail to improve.  - predniSONE (DELTASONE) 20 MG tablet; Take 3 tabs by mouth daily x 3 days, then 2 tabs daily x 3 days, then 1 tab daily x 3 days, then 1/2 tab daily x 3 days.    Tension headache  - predniSONE (DELTASONE) 20 MG tablet; Take 3 tabs by mouth daily x 3 days, then 2 tabs daily x 3 days, then 1 tab daily x 3 days, then 1/2 tab daily x 3 days.    The patient indicates understanding of these issues and agrees with the plan.    Brigid Sahu PA-C  [facility]    Phone call duration: 8 minutes

## 2021-03-05 ENCOUNTER — VIRTUAL VISIT (OUTPATIENT)
Dept: FAMILY MEDICINE | Facility: CLINIC | Age: 31
End: 2021-03-05
Payer: OTHER GOVERNMENT

## 2021-03-05 DIAGNOSIS — M54.50 LUMBAR BACK PAIN: Primary | ICD-10-CM

## 2021-03-05 DIAGNOSIS — G44.209 TENSION HEADACHE: ICD-10-CM

## 2021-03-05 PROCEDURE — 99441 PR PHYSICIAN TELEPHONE EVALUATION 5-10 MIN: CPT | Mod: TEL | Performed by: PHYSICIAN ASSISTANT

## 2021-03-05 RX ORDER — PREDNISONE 20 MG/1
TABLET ORAL
Qty: 20 TABLET | Refills: 0 | Status: SHIPPED | OUTPATIENT
Start: 2021-03-05 | End: 2021-05-03

## 2021-03-15 ENCOUNTER — VIRTUAL VISIT (OUTPATIENT)
Dept: PSYCHOLOGY | Facility: CLINIC | Age: 31
End: 2021-03-15
Attending: PHYSICIAN ASSISTANT
Payer: OTHER GOVERNMENT

## 2021-03-15 DIAGNOSIS — F32.A DEPRESSIVE DISORDER: Primary | ICD-10-CM

## 2021-03-15 PROCEDURE — 90834 PSYTX W PT 45 MINUTES: CPT | Mod: 95 | Performed by: PSYCHOLOGIST

## 2021-03-15 ASSESSMENT — ANXIETY QUESTIONNAIRES
2. NOT BEING ABLE TO STOP OR CONTROL WORRYING: SEVERAL DAYS
GAD7 TOTAL SCORE: 10
3. WORRYING TOO MUCH ABOUT DIFFERENT THINGS: SEVERAL DAYS
7. FEELING AFRAID AS IF SOMETHING AWFUL MIGHT HAPPEN: NOT AT ALL
GAD7 TOTAL SCORE: 10
4. TROUBLE RELAXING: MORE THAN HALF THE DAYS
6. BECOMING EASILY ANNOYED OR IRRITABLE: MORE THAN HALF THE DAYS
7. FEELING AFRAID AS IF SOMETHING AWFUL MIGHT HAPPEN: NOT AT ALL
1. FEELING NERVOUS, ANXIOUS, OR ON EDGE: MORE THAN HALF THE DAYS
5. BEING SO RESTLESS THAT IT IS HARD TO SIT STILL: MORE THAN HALF THE DAYS

## 2021-03-15 ASSESSMENT — PATIENT HEALTH QUESTIONNAIRE - PHQ9
SUM OF ALL RESPONSES TO PHQ QUESTIONS 1-9: 10
SUM OF ALL RESPONSES TO PHQ QUESTIONS 1-9: 10
10. IF YOU CHECKED OFF ANY PROBLEMS, HOW DIFFICULT HAVE THESE PROBLEMS MADE IT FOR YOU TO DO YOUR WORK, TAKE CARE OF THINGS AT HOME, OR GET ALONG WITH OTHER PEOPLE: SOMEWHAT DIFFICULT

## 2021-03-15 NOTE — PROGRESS NOTES
Progress Note - Initial Visit    Client Name:  Hansa Ta Date: 3/15/21         Service Type: Individual     Visit Start Time: 11:00am Visit End Time: 11:51am    Visit #: 1 51 minutes    Attendees: Client attended alone    Service Modality:  Video Visit:      Provider verified identity through the following two step process.  Patient provided:  Patient     Telemedicine Visit: The patient's condition can be safely assessed and treated via synchronous audio and visual telemedicine encounter.      Reason for Telemedicine Visit: Services only offered telehealth    Originating Site (Patient Location): Patient's home    Distant Site (Provider Location): Provider Remote Setting    Consent:  The patient/guardian has verbally consented to: the potential risks and benefits of telemedicine (video visit) versus in person care; bill my insurance or make self-payment for services provided; and responsibility for payment of non-covered services.     Patient would like the video invitation sent by:  Send to e-mail at: [email or web address]    Mode of Communication:  Video Conference via [employer]well    As the provider I attest to compliance with applicable laws and regulations related to telemedicine.       DATA:   Interactive Complexity: No   Crisis: No     Presenting Concerns/  Current Stressors:   Raising two children; not working; going to school      ASSESSMENT:  Mental Status Assessment:  Appearance:   Appropriate   Eye Contact:   Fair   Psychomotor Behavior: Normal   Attitude:   Cooperative   Orientation:   All  Speech   Rate / Production: Normal/ Responsive   Volume:  Normal   Mood:    Sad   Affect:    Appropriate   Thought Content:  Clear   Thought Form:  Coherent   Insight:    Fair       Safety Issues and Plan for Safety and Risk Management:   [place] Suicide Severity Rating Scale (Lifetime/Recent)No flowsheet data found.  Patient denies current fears or concerns for personal safety.  Patient denies  current or recent suicidal ideation or behaviors.  Patient denies current or recent homicidal ideation or behaviors.  Patient denies current or recent self injurious behavior or ideation.  Patient denies other safety concerns.  Recommended that patient call 911 or go to the local ED should there be a change in any of these risk factors.  Patient reports there are no firearms in the house.     Diagnostic Criteria:  Unspecified Depressive Disorder      DSM5 Diagnoses: (Sustained by DSM5 Criteria Listed Above)  Diagnoses: 311 (F32.9) Unspecified Depressive Disorder   Psychosocial & Contextual Factors: Raising two children; not working; going to school    WHODAS 2.0 (12 item):   WHODAS 2.0 Total Score 3/15/2021   Total Score 23   Total Score MyChart 23     Intervention:   CBT- Patient was educated on the CBT model and asked to bring in examples at next session; rapport building skills; active listening; clarifying questions  Collateral Reports Completed:  Not Applicable      PLAN: (Homework, other):  1. Provider will continue Diagnostic Assessment.  Patient was given the following to do until next session:  Think of goals for therapy.    2. Provider recommended the following referrals: None at this time.      3.  Safety plan created.  Provider recommended that patient continue with individual therapy.       Mars Alcantara  March 15, 2021        Answers for HPI/ROS submitted by the patient on 3/15/2021   SVEN 7 TOTAL SCORE: 10  If you checked off any problems, how difficult have these problems made it for you to do your work, take care of things at home, or get along with other people?: Somewhat difficult  PHQ9 TOTAL SCORE: 10

## 2021-03-16 ASSESSMENT — PATIENT HEALTH QUESTIONNAIRE - PHQ9: SUM OF ALL RESPONSES TO PHQ QUESTIONS 1-9: 10

## 2021-03-16 ASSESSMENT — ANXIETY QUESTIONNAIRES: GAD7 TOTAL SCORE: 10

## 2021-03-30 ENCOUNTER — VIRTUAL VISIT (OUTPATIENT)
Dept: PSYCHOLOGY | Facility: CLINIC | Age: 31
End: 2021-03-30
Payer: OTHER GOVERNMENT

## 2021-03-30 DIAGNOSIS — F32.A DEPRESSIVE DISORDER: Primary | ICD-10-CM

## 2021-03-30 PROCEDURE — 90791 PSYCH DIAGNOSTIC EVALUATION: CPT | Mod: 95 | Performed by: PSYCHOLOGIST
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
## 2021-04-08 ENCOUNTER — VIRTUAL VISIT (OUTPATIENT)
Dept: PSYCHOLOGY | Facility: CLINIC | Age: 31
End: 2021-04-08
Payer: OTHER GOVERNMENT

## 2021-04-08 DIAGNOSIS — F32.A DEPRESSIVE DISORDER: Primary | ICD-10-CM

## 2021-04-08 PROCEDURE — 90834 PSYTX W PT 45 MINUTES: CPT | Mod: 95 | Performed by: PSYCHOLOGIST

## 2021-04-08 NOTE — PROGRESS NOTES
"                                             Progress Note    Patient Name: Hansa Ta  Date: 4/8/21         Service Type: Individual      Session Start Time: 9:01am Session End Time: 9:53am     Session Length: 52 minutes    Session #: 3    Attendees: Client attended alone    Service Modality:  Video Visit:      Provider verified identity through the following two step process.  Patient provided:  Patient photo    Telemedicine Visit: The patient's condition can be safely assessed and treated via synchronous audio and visual telemedicine encounter.      Reason for Telemedicine Visit: Services only offered telehealth    Originating Site (Patient Location): Patient's home    Distant Site (Provider Location): Provider Remote Setting    Consent:  The patient/guardian has verbally consented to: the potential risks and benefits of telemedicine (video visit) versus in person care; bill my insurance or make self-payment for services provided; and responsibility for payment of non-covered services.     Patient would like the video invitation sent by:  Send to e-mail at: [email or web address]    Mode of Communication:  Video Conference via Amwell    As the provider I attest to compliance with applicable laws and regulations related to telemedicine.     Treatment Plan Last Reviewed: Started this session; In progress.  PHQ-9 / SVEN-7 : 10 / 10 (on 3/15/21)    DATA  Interactive Complexity: No  Crisis: No       Progress Since Last Session (Related to Symptoms / Goals / Homework):   Symptoms: No change : Stable    Homework: N/A      She's changed her major to criminal justice is passionate about criminal justice reform (and ).  I'm close to graduation and I'm been looking for jobs.  Overall, I  really want a job  in criminal reform/justice.  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.\"      Episode of Care Goals: N/A     Current / Ongoing Stressors and Concerns:   Client's  has been deployed, she is sole " caretaker for her two children     Treatment Objective(s) Addressed in This Session:   Discussed diagnoses, discussed possible goals (building a purpose in life), discuss assigning and completing homework, processed client's desire to have less time between sessions     Intervention:   CBT: Psychoeducation; assign homework; reframe  Identified problematic behaviors and/or thinking; clarified possible goals; empathy, active listening, and other rapport building skills        ASSESSMENT: Current Emotional / Mental Status (status of significant symptoms):   Risk status (Self / Other harm or suicidal ideation)   Patient denies current fears or concerns for personal safety.   Patient denies current or recent suicidal ideation or behaviors.   Patient denies current or recent homicidal ideation or behaviors.   Patient denies current or recent self injurious behavior or ideation.   Patient denies other safety concerns.   Patient reports there has been no change in risk factors since their last session.     Patient reports there has been no change in protective factors since their last session.     Recommended that patient call 911 or go to the local ED should there be a change in any of these risk factors.     Appearance:   Appropriate    Eye Contact:   Fair    Psychomotor Behavior: Normal    Attitude:   Cooperative    Orientation:   All   Speech    Rate / Production: Normal/ Responsive Normal     Volume:  Normal    Mood:    Sad    Affect:    Appropriate    Thought Content:  Clear    Thought Form:  Coherent  Logical    Insight:    Fair      Medication Review:   No changes to current psychiatric medication(s)     Medication Compliance:   Yes     Changes in Health Issues:   None reported     Chemical Use Review:   Substance Use: Chemical use reviewed, no active concerns identified      Tobacco Use: No current tobacco use.      Diagnosis:  1. Unspecified depressive disorder        Collateral Reports Completed:   Not  Applicable    PLAN: (Patient Tasks / Therapist Tasks / Other)  Client will research possible occupations once she receives her BA (to build purpose in life).  She will also think of therapeutic goals to discuss next session.  The client made a follow-up appointment for April 13, 2021.        Mars Alcantara  April 8, 2021

## 2021-04-13 ENCOUNTER — VIRTUAL VISIT (OUTPATIENT)
Dept: PSYCHOLOGY | Facility: CLINIC | Age: 31
End: 2021-04-13
Payer: OTHER GOVERNMENT

## 2021-04-13 DIAGNOSIS — F32.A DEPRESSIVE DISORDER: Primary | ICD-10-CM

## 2021-04-13 PROCEDURE — 90834 PSYTX W PT 45 MINUTES: CPT | Mod: 95 | Performed by: PSYCHOLOGIST

## 2021-04-13 NOTE — PROGRESS NOTES
"                                             Progress Note    Patient Name: Hansa Ta  Date: 4/8/21         Service Type: Individual      Session Start Time: 8:00am Session End Time: 8:52am     Session Length: 52 minutes    Session #: 4    Attendees: Client attended alone    Service Modality:  Video Visit:      Provider verified identity through the following two step process.  Patient provided:  Patient photo    Telemedicine Visit: The patient's condition can be safely assessed and treated via synchronous audio and visual telemedicine encounter.      Reason for Telemedicine Visit: Services only offered telehealth    Originating Site (Patient Location): Patient's home    Distant Site (Provider Location): Provider Remote Setting    Consent:  The patient/guardian has verbally consented to: the potential risks and benefits of telemedicine (video visit) versus in person care; bill my insurance or make self-payment for services provided; and responsibility for payment of non-covered services.     Patient would like the video invitation sent by:  Send to e-mail at: [email or web address]    Mode of Communication:  Video Conference via Amwell    As the provider I attest to compliance with applicable laws and regulations related to telemedicine.     Treatment Plan Last Reviewed: 4/13/21  PHQ-9 / SVEN-7 : 10 / 10 (on 3/15/21)    DATA  Interactive Complexity: No  Crisis: No       Progress Since Last Session (Related to Symptoms / Goals / Homework):   Symptoms: No change : Stable, \"I'm ok.  I feel I been in a bit of a funk.\"    Homework: N/A          Notes from 4/13/21 Session:   \"A lot of jobs are so far away from me.\"  She is thinking about moving closer to open positions.  She did see positions in [facility] ().  She graduates in August, 2021.    She asked a question, \" Do you (this writer) think it would be a ok to take the kids to [place] in September if we (her and her ) aren't together " "anymore?\" - recommended only if they can get along as a mother and father and not put the kids in the middle.    \"I can kind of sweep things under the rug.  I'd rather not resolve the issues.\"     HW; Track daily if she was able to interact with her children, if yes, how, if not, what was/were the barrier/barriers?           Episode of Care Goals: N/A     Current / Ongoing Stressors and Concerns:   Client's  has been deployed, she is sole caretaker for her two children; in school; looking for a job     Treatment Objective(s) Addressed in This Session:   Discussed job opportunities; answered parenting questions; talked about client's difficulty with assertiveness; create Treatment Plan and homework     Intervention:   CBT: Psychoeducation; assign homework; parenting recommendations; reframe  Created Treatment Plan; asked clarifying questions to help write goals; asked open-ended questions, active listening, and other rapport building skills        ASSESSMENT: Current Emotional / Mental Status (status of significant symptoms):   Risk status (Self / Other harm or suicidal ideation)   Patient denies current fears or concerns for personal safety.   Patient denies current or recent suicidal ideation or behaviors.   Patient denies current or recent homicidal ideation or behaviors.   Patient denies current or recent self injurious behavior or ideation.   Patient denies other safety concerns.   Patient reports there has been no change in risk factors since their last session.     Patient reports there has been no change in protective factors since their last session.     Recommended that patient call 911 or go to the local ED should there be a change in any of these risk factors.     Appearance:   Appropriate    Eye Contact:   Fair    Psychomotor Behavior: Normal    Attitude:   Cooperative    Orientation:   All   Speech    Rate / Production: Normal/ Responsive Normal     Volume:  Normal    Mood:    Sad " "   Affect:    Appropriate    Thought Content:  Clear    Thought Form:  Coherent  Logical    Insight:    Fair      Medication Review:   No changes to current psychiatric medication(s)     Medication Compliance:   Yes     Changes in Health Issues:   None reported     Chemical Use Review:   Substance Use: Chemical use reviewed, no active concerns identified      Tobacco Use: No current tobacco use.      Diagnosis:  1. Unspecified depressive disorder        Collateral Reports Completed:   Not Applicable    PLAN: (Patient Tasks / Therapist Tasks / Other)  Client will plan to spend time each day playing with her children.  If she was able to, what skills did she use, if not able to, what was/were the barrier(s).  The client made a follow-up appointment for April 23, 2021.        Mars Lucas Quinton    ______________________________________________________________________    Treatment Plan    Patient's Name: Hansa Ta  YOB: 1990    Date: 4/13/21    DSM5 Diagnoses: 311 (F32.9) Unspecified Depressive Disorder   Psychosocial / Contextual Factors: Client's  is deployed; she is looking for employment; raising two children; going to school  WHODAS:  23 (on 3/15/21)    Referral / Collaboration:  Referral to another professional/service is not indicated at this time..    Anticipated number of session or this episode of care: 24      MeasurableTreatment Goal(s) related to diagnosis / functional impairment(s)  Goal 1: Patient will \"I'm more mentally stable and there for my kids.  I don't want to be so anxious all the time that everything needs to get done (instead of playing with my kids).  I want to spend more time with my kids.   She feels \"too overwhelmed (w/tasks around the house) to spend time with her kids.    I will know I've met my goal when \"it's easier to me to drop what I'm doing and play with my kids.  Her goal is to play with her kids each day.    Objective #A Spend time daily playing with " "children.  When outdoors: Blow bubbles, taking them to the park but \"I'm not really engaging with them.\"  Indoors: Read with my daughter, play with train set with son and build blocks,   Patient will spend time each day with her children playing..  Status: New - Date: 4/13/21     Intervention(s)  Therapist will assign homework as appropriate  Problem solve barriers when necessary.    Objective #B  Patient will TBD.  Status: TBD     Intervention(s)  Therapist will TBD.      Goal 2: Patient will \"try to figure out if my  and I can work through our past traumas in order to figure out if we can be together or not.  I feel him and I are in a grey area and it needs to be black and white.\"    I will know I've met my goal when I feel certain of my choice to stay together or not.  Currently at a 3 out of 10 (1 being least certain, 10 being most certain).  Her goal is to be at a 10 out of 10 in choosing what to do.    Objective #A Client will talk about the pros and cons of her current marriage.    Status: New - Date: 4/13/21     Patient will discuss the pros and cons, and her concerns, regarding her marriage.    Intervention(s)  Therapist will assign homework as appropriate  role-play effective communication skills  teach about communication and relational skills.    Objective #B   Patient will TBD.    Status: TBD     Intervention(s)  Therapist will TBD.      Goal 3: Patient will \"start a relationship with my mom again but I don't know if that's possible while she's drinking.\"    I will know I've met my goal when I can be in the same room with her without feeling of all that anxiety.  Either letting go of my resentments or personal feelings toward her and if we can have a relationship.      Objective #A Client would like to discuss if she wants a relationship with her mom or not.    Status: New - Date: 4/13/21     Patient will learn & utilize at least One assertive communication skills " weekly.    Intervention(s)  Therapist will assign homework as appropriate  role-play assertiveness skills  teach about healthy boundaries. Boundaries to set with her mother if she does engage..    Objective #B  Patient will TBD.    Status: New - Date: TBD     Intervention(s)  Therapist will TBD.    Patient has reviewed and agreed to the above plan.      Mars Alcantara  April 13, 2021

## 2021-04-23 ENCOUNTER — VIRTUAL VISIT (OUTPATIENT)
Dept: PSYCHOLOGY | Facility: CLINIC | Age: 31
End: 2021-04-23
Payer: OTHER GOVERNMENT

## 2021-04-23 DIAGNOSIS — F32.A DEPRESSIVE DISORDER: Primary | ICD-10-CM

## 2021-04-23 PROCEDURE — 90834 PSYTX W PT 45 MINUTES: CPT | Mod: 95 | Performed by: PSYCHOLOGIST
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
## 2021-04-26 ENCOUNTER — APPOINTMENT (OUTPATIENT)
Dept: PSYCHOLOGY | Facility: CLINIC | Age: 31
End: 2021-04-26
Payer: OTHER GOVERNMENT

## 2021-06-01 DIAGNOSIS — R63.5 WEIGHT GAIN: ICD-10-CM

## 2021-06-01 DIAGNOSIS — Z20.1 EXPOSURE TO TB: ICD-10-CM

## 2021-06-01 LAB — TSH SERPL DL<=0.005 MIU/L-ACNC: 1.01 MU/L (ref 0.4–4)

## 2021-06-01 PROCEDURE — 86481 TB AG RESPONSE T-CELL SUSP: CPT | Performed by: PHYSICIAN ASSISTANT

## 2021-06-01 PROCEDURE — 36415 COLL VENOUS BLD VENIPUNCTURE: CPT | Performed by: PHYSICIAN ASSISTANT

## 2021-06-01 PROCEDURE — 84443 ASSAY THYROID STIM HORMONE: CPT | Performed by: PHYSICIAN ASSISTANT

## 2021-06-01 PROCEDURE — 82785 ASSAY OF IGE: CPT | Performed by: PHYSICIAN ASSISTANT

## 2021-06-01 PROCEDURE — 86003 ALLG SPEC IGE CRUDE XTRC EA: CPT | Performed by: PHYSICIAN ASSISTANT

## 2021-06-03 LAB
ALMOND IGE QN: <0.1 KU(A)/L
CASHEW NUT IGE QN: <0.1 KU(A)/L
CODFISH IGE QN: <0.1 KU(A)/L
COW MILK IGE QN: <0.1 KU(A)/L
EGG WHITE IGE QN: <0.1 KU(A)/L
GAMMA INTERFERON BACKGROUND BLD IA-ACNC: 0.04 IU/ML
HAZELNUT IGE QN: <0.1 KU(A)/L
IGE SERPL-ACNC: 13 KIU/L (ref 0–114)
M TB IFN-G CD4+ BCKGRND COR BLD-ACNC: 9.96 IU/ML
M TB TUBERC IFN-G BLD QL: NEGATIVE
MITOGEN IGNF BCKGRD COR BLD-ACNC: 0 IU/ML
MITOGEN IGNF BCKGRD COR BLD-ACNC: 0 IU/ML
PEANUT IGE QN: <0.1 KU(A)/L
SALMON IGE QN: <0.1 KU(A)/L
SCALLOP IGE QN: <0.1 KU(A)/L
SESAME SEED IGE QN: <0.1 KU(A)/L
SHRIMP IGE QN: <0.1 KU(A)/L
SOYBEAN IGE QN: <0.1 KU(A)/L
TUNA IGE QN: <0.1 KU(A)/L
WALNUT IGE QN: <0.1 KU(A)/L
WHEAT IGE QN: <0.1 KU(A)/L

## 2021-06-07 ENCOUNTER — TELEPHONE (OUTPATIENT)
Dept: FAMILY MEDICINE | Facility: CLINIC | Age: 31
End: 2021-06-07

## 2021-06-08 NOTE — TELEPHONE ENCOUNTER
Prior Authorization Retail Medication Request    Medication/Dose: Phentermine 15mg  ICD code (if different than what is on RX):    Previously Tried and Failed:    Rationale:      Insurance Name:  [employer] AYALA  Insurance ID:  [id number]      Pharmacy Information (if different than what is on RX)  Name: [facility]  Phone:  [phone]     Plan protocol - PA required.    Thank you,  Kecia Calderon CPhT  [facility]

## 2021-06-09 NOTE — TELEPHONE ENCOUNTER
Central Prior Authorization Team   Phone: [phone]      PA Initiation    Medication: Phentermine 15mg  Insurance Company:  - Phone [phone] Fax [phone]  Pharmacy Filling the Rx: [address]   Filling Pharmacy Phone: [phone]  Filling Pharmacy Fax:    Start Date: 6/9/2021

## 2021-06-09 NOTE — TELEPHONE ENCOUNTER
Prior Authorization Approval    Authorization Effective Date: 5/10/2021  Authorization Expiration Date: 9/7/2021  Medication: Phentermine 15mg  Approved Dose/Quantity:    Reference #:     Insurance Company: [employer] [phone] Fax [phone]  Expected CoPay:       CoPay Card Available:      Foundation Assistance Needed:    Which Pharmacy is filling the prescription (Not needed for infusion/clinic administered): [facility] [address]   Pharmacy Notified: Yes  Patient Notified: Yes  **Instructed pharmacy to notify patient when script is ready to /ship.**

## 2021-07-12 ENCOUNTER — MYC MEDICAL ADVICE (OUTPATIENT)
Dept: FAMILY MEDICINE | Facility: CLINIC | Age: 31
End: 2021-07-12

## 2021-07-23 ENCOUNTER — OFFICE VISIT (OUTPATIENT)
Dept: URGENT CARE | Facility: URGENT CARE | Age: 31
End: 2021-07-23
Payer: OTHER GOVERNMENT

## 2021-07-23 VITALS
HEART RATE: 71 BPM | TEMPERATURE: 98.7 F | SYSTOLIC BLOOD PRESSURE: 110 MMHG | OXYGEN SATURATION: 100 % | DIASTOLIC BLOOD PRESSURE: 77 MMHG

## 2021-07-23 DIAGNOSIS — R10.31 ABDOMINAL PAIN, RIGHT LOWER QUADRANT: Primary | ICD-10-CM

## 2021-07-23 PROCEDURE — 99214 OFFICE O/P EST MOD 30 MIN: CPT | Performed by: PHYSICIAN ASSISTANT

## 2021-07-23 ASSESSMENT — ENCOUNTER SYMPTOMS
DIARRHEA: 0
ABDOMINAL PAIN: 1
CONSTIPATION: 0
VOMITING: 0

## 2021-07-23 NOTE — PROGRESS NOTES
SUBJECTIVE:   Hansa Ta is a 31 year old female presenting with a chief complaint of   Chief Complaint   Patient presents with     Abdominal Pain     RLQ pain x24 hours. has had 5 surgeries for ovarian cysts.        She is an established patient of [facility].  Patient presents with abdominal pain that began after a BM yesterday on the right lower quadrant.  Patient has a hx of ovarian cysts and has has 5 surgeries for it.  She has not had a problem in many years.  Hx of appendectomy and GB removal.  Abdominal pain always present intensity changes.  No consistent radiation.  7/10.  BM was normal, and a lot.  Tylenol and motrin yesterday.  ? Bloody BM a few nights a go - not sure if it was blood.  Last meal at 9 pm.  Only drank water today and iced tea drink earlier.  Eating made worse.      Surgeries:  csection x 2; 5 x ovarian cyst surgery, appendix and GB - 10 years ago last cyst surgery.  Medications:  Reviewed      Review of Systems   Gastrointestinal: Positive for abdominal pain. Negative for constipation, diarrhea and vomiting.   All other systems reviewed and are negative.      Past Medical History:   Diagnosis Date     Endometriosis      Ovarian cyst      Family History   Problem Relation Age of Onset     Spine Problems Mother         related to injury     Cancer Mother 35        ovarian     Lung Cancer Father      Diabetes Maternal Grandmother      Fibromyalgia Maternal Aunt      Current Outpatient Medications   Medication Sig Dispense Refill     clonazePAM (KLONOPIN) 1 MG tablet Take 1 tablet (1 mg) by mouth 2 times daily as needed for anxiety 20 tablet 0     colchicine (COLCYRS) 0.6 MG tablet Take 1 tablet (0.6 mg) by mouth daily 30 tablet 4     meloxicam (MOBIC) 7.5 MG tablet Take 1 tablet (7.5 mg) by mouth daily 30 tablet 2     traZODone (DESYREL) 50 MG tablet Take 1-4 tablets ( mg) by mouth At Bedtime 90 tablet 0     phentermine (ADIPEX-P) 15 MG capsule Take 1 capsule (15 mg) by mouth every  morning (Patient not taking: Reported on 7/23/2021) 30 capsule 0     predniSONE (DELTASONE) 20 MG tablet Take 3 tabs by mouth daily x 3 days, then 2 tabs daily x 3 days, then 1 tab daily x 3 days, then 1/2 tab daily x 3 days. (Patient not taking: Reported on 7/23/2021) 20 tablet 0     Social History     Tobacco Use     Smoking status: Never Smoker     Smokeless tobacco: Never Used   Substance Use Topics     Alcohol use: Yes     Comment: 2-3 per week        OBJECTIVE  /77   Pulse 71   Temp 98.7  F (37.1  C) (Tympanic)   LMP  (LMP Unknown)   SpO2 100%     Physical Exam  Vitals and nursing note reviewed.   Constitutional:       Appearance: Normal appearance. She is obese.   Eyes:      Extraocular Movements: Extraocular movements intact.      Conjunctiva/sclera: Conjunctivae normal.   Cardiovascular:      Rate and Rhythm: Normal rate.   Abdominal:      General: Abdomen is flat. Bowel sounds are normal.      Palpations: Abdomen is soft.      Tenderness: There is abdominal tenderness.      Comments: Right mid and right lower abdominal pain with palpation.   Skin:     General: Skin is warm and dry.   Neurological:      General: No focal deficit present.      Mental Status: She is alert.   Psychiatric:         Mood and Affect: Mood normal.         Behavior: Behavior normal.         Labs:  No results found for this or any previous visit (from the past 24 hour(s)).    X-Ray was not done.    ASSESSMENT:      ICD-10-CM    1. Abdominal pain, right lower quadrant  R10.31 CT Abdomen w/o Contrast        Medical Decision Making:    Differential Diagnosis:  Abdominal Pain: Ovarian Cyst, Bowel Obstruction and Non Specific    Serious Comorbid Conditions:  Adult:  as above    PLAN:    ABD Pain:  Patient sent to ED as her pain increased while here.  Unable to schedule CT for her until another 4 hours.  Patient left in stable condition.  Her  will meet her to drive her to ED.      Followup:    If not improving or if  condition worsens, follow up with your Primary Care Provider, Transfer to ED via private car    There are no Patient Instructions on file for this visit.

## 2021-09-13 ENCOUNTER — VIRTUAL VISIT (OUTPATIENT)
Dept: FAMILY MEDICINE | Facility: CLINIC | Age: 31
End: 2021-09-13
Payer: OTHER GOVERNMENT

## 2021-09-13 DIAGNOSIS — R30.0 DYSURIA: ICD-10-CM

## 2021-09-13 DIAGNOSIS — M1A.9XX0 CHRONIC GOUT INVOLVING TOE WITHOUT TOPHUS, UNSPECIFIED CAUSE, UNSPECIFIED LATERALITY: ICD-10-CM

## 2021-09-13 DIAGNOSIS — N83.209 CYST OF OVARY, UNSPECIFIED LATERALITY: Primary | ICD-10-CM

## 2021-09-13 DIAGNOSIS — M54.50 LUMBAR BACK PAIN: ICD-10-CM

## 2021-09-13 PROCEDURE — 99214 OFFICE O/P EST MOD 30 MIN: CPT | Mod: 95 | Performed by: PHYSICIAN ASSISTANT

## 2021-09-13 RX ORDER — COLCHICINE 0.6 MG/1
0.6 TABLET ORAL DAILY
Qty: 90 TABLET | Refills: 1 | Status: SHIPPED | OUTPATIENT
Start: 2021-09-13 | End: 2022-12-01

## 2021-09-13 RX ORDER — MELOXICAM 7.5 MG/1
7.5 TABLET ORAL DAILY
Qty: 90 TABLET | Refills: 1 | Status: SHIPPED | OUTPATIENT
Start: 2021-09-13 | End: 2022-12-01

## 2021-09-13 RX ORDER — PREDNISONE 20 MG/1
TABLET ORAL
Qty: 20 TABLET | Refills: 0 | Status: SHIPPED | OUTPATIENT
Start: 2021-09-13 | End: 2021-09-29

## 2021-09-13 ASSESSMENT — PATIENT HEALTH QUESTIONNAIRE - PHQ9: SUM OF ALL RESPONSES TO PHQ QUESTIONS 1-9: 8

## 2021-09-13 NOTE — PROGRESS NOTES
Hansa is a 31 year old who is being evaluated via a billable telephone visit.      What phone number would you like to be contacted at? [phone]  How would you like to obtain your AVS? Kristal Chase   Hansa is a 31 year old who presents for the following health issues    HPI     Medication Followup of cochicine and meloxicam    Taking Medication as prescribed: yes    Side Effects:  None    Medication Helping Symptoms:  yes     Genitourinary - Female  Onset/Duration: off and on x 1 month  Description:   Painful urination (Dysuria): no           Frequency: YES  Blood in urine (Hematuria): no  Delay in urine (Hesitency): no  Intensity:   Progression of Symptoms:  intermittent  Accompanying Signs & Symptoms:  Fever/chills: no  Flank pain: YES  Nausea and vomiting: no  Vaginal symptoms: discharge and itching  Abdominal/Pelvic Pain: YES  History:   History of frequent UTI s: no  History of kidney stones: no  Sexually Active: YES  Possibility of pregnancy: No  Precipitating or alleviating factors:   Therapies tried and outcome:  ibuprofen      Spoke with patient today via telephone due to current COVID 19 concerns. Patient reports overall she has been doing well with current  Medications. Gout has been well controlled with colchicine and overall back has been doing well with Meloxicam but for some reason things flared over the weekend. She is requesting Prednisone course as this has been working well.    She was in the ER in July for an ovarian cyst. She states she had a lot of these in her teens and was seeing GYN at that time. Would like a referral to re-establish. She also reports that she has been having urinary symptoms over the last 1 month. Was really intense with frequent urination. Now just slightly bothersome but having vaginal discharge with odor and some discomfort/     Review of Systems   Constitutional, HEENT, cardiovascular, pulmonary, GI, , musculoskeletal, neuro, skin, endocrine and psych  systems are negative, except as otherwise noted.      Objective         Vitals:  No vitals were obtained today due to virtual visit.    Physical Exam   healthy, alert and no distress  PSYCH: Alert and oriented times 3; coherent speech, normal   rate and volume, able to articulate logical thoughts, able   to abstract reason, no tangential thoughts, no hallucinations   or delusions  Her affect is normal and pleasant  RESP: No cough, no audible wheezing, able to talk in full sentences  Remainder of exam unable to be completed due to telephone visits      Assessment & Plan     Cyst of ovary, unspecified laterality  Referral to GYN placed today as patient with recurrent ovarian cysts in the past.   - Ob/Gyn Referral; Future    Lumbar back pain  Will start steroid course. Encouraged gentle stretching and ice/heat as needed.   - predniSONE (DELTASONE) 20 MG tablet; Take 3 tabs by mouth daily x 3 days, then 2 tabs daily x 3 days, then 1 tab daily x 3 days, then 1/2 tab daily x 3 days.  - meloxicam (MOBIC) 7.5 MG tablet; Take 1 tablet (7.5 mg) by mouth daily    Chronic gout involving toe without tophus, unspecified cause, unspecified laterality  Stable.   - colchicine (COLCYRS) 0.6 MG tablet; Take 1 tablet (0.6 mg) by mouth daily    Dysuria  Labs ordered today. Treatment pending these results.   - UA Macro with Reflex to Micro and Culture - lab collect; Future  - Wet prep - lab collect; Future    The patient indicates understanding of these issues and agrees with the plan.    Brigid Sahu PA-C  [facility]    Phone call duration: 6 minutes

## 2021-09-15 ENCOUNTER — LAB (OUTPATIENT)
Dept: LAB | Facility: CLINIC | Age: 31
End: 2021-09-15
Payer: OTHER GOVERNMENT

## 2021-09-15 DIAGNOSIS — R30.0 DYSURIA: ICD-10-CM

## 2021-09-15 LAB
ALBUMIN UR-MCNC: NEGATIVE MG/DL
APPEARANCE UR: CLEAR
BILIRUB UR QL STRIP: NEGATIVE
CLUE CELLS: ABNORMAL
COLOR UR AUTO: NORMAL
GLUCOSE UR STRIP-MCNC: NEGATIVE MG/DL
HGB UR QL STRIP: NEGATIVE
KETONES UR STRIP-MCNC: NEGATIVE MG/DL
LEUKOCYTE ESTERASE UR QL STRIP: NEGATIVE
NITRATE UR QL: NEGATIVE
PH UR STRIP: 5 [PH] (ref 5–7)
SP GR UR STRIP: 1.01 (ref 1–1.03)
TRICHOMONAS, WET PREP: ABNORMAL
UROBILINOGEN UR STRIP-MCNC: NORMAL MG/DL
WBC'S/HIGH POWER FIELD, WET PREP: ABNORMAL
YEAST, WET PREP: ABNORMAL

## 2021-09-15 PROCEDURE — 87210 SMEAR WET MOUNT SALINE/INK: CPT

## 2021-09-15 PROCEDURE — 81003 URINALYSIS AUTO W/O SCOPE: CPT

## 2021-09-15 NOTE — RESULT ENCOUNTER NOTE
Results released to patient via [facility].    Brigid Sahu PA-C   [Least Pain Intensity: 0/10] : 0/10 [Maximal Pain Intensity: 0/10] : 0/10 [80: Normal activity with effort; some signs or symptoms of disease.] : 80: Normal activity with effort; some signs or symptoms of disease.

## 2021-09-21 ENCOUNTER — OFFICE VISIT (OUTPATIENT)
Dept: OBGYN | Facility: CLINIC | Age: 31
End: 2021-09-21
Payer: OTHER GOVERNMENT

## 2021-09-21 VITALS
BODY MASS INDEX: 33.58 KG/M2 | SYSTOLIC BLOOD PRESSURE: 110 MMHG | WEIGHT: 201.8 LBS | DIASTOLIC BLOOD PRESSURE: 75 MMHG | HEART RATE: 83 BPM

## 2021-09-21 DIAGNOSIS — Z12.4 SCREENING FOR MALIGNANT NEOPLASM OF CERVIX: ICD-10-CM

## 2021-09-21 DIAGNOSIS — N80.9 ENDOMETRIOSIS: ICD-10-CM

## 2021-09-21 DIAGNOSIS — N83.201 CYST OF RIGHT OVARY: Primary | ICD-10-CM

## 2021-09-21 DIAGNOSIS — Z11.3 SCREEN FOR STD (SEXUALLY TRANSMITTED DISEASE): ICD-10-CM

## 2021-09-21 DIAGNOSIS — R10.2 PELVIC PAIN IN FEMALE: ICD-10-CM

## 2021-09-21 PROCEDURE — 87491 CHLMYD TRACH DNA AMP PROBE: CPT | Performed by: OBSTETRICS & GYNECOLOGY

## 2021-09-21 PROCEDURE — 99205 OFFICE O/P NEW HI 60 MIN: CPT | Performed by: OBSTETRICS & GYNECOLOGY

## 2021-09-21 PROCEDURE — 87591 N.GONORRHOEAE DNA AMP PROB: CPT | Performed by: OBSTETRICS & GYNECOLOGY

## 2021-09-21 PROCEDURE — G0145 SCR C/V CYTO,THINLAYER,RESCR: HCPCS | Performed by: OBSTETRICS & GYNECOLOGY

## 2021-09-21 RX ORDER — KETOROLAC TROMETHAMINE 10 MG/1
10 TABLET, FILM COATED ORAL EVERY 6 HOURS PRN
Qty: 60 TABLET | Refills: 0 | Status: SHIPPED | OUTPATIENT
Start: 2021-09-21 | End: 2022-03-07

## 2021-09-21 RX ORDER — NORETHINDRONE ACETATE AND ETHINYL ESTRADIOL .02; 1 MG/1; MG/1
1 TABLET ORAL DAILY
Qty: 90 TABLET | Refills: 0 | Status: SHIPPED | OUTPATIENT
Start: 2021-09-21 | End: 2022-03-07

## 2021-09-21 RX ORDER — IBUPROFEN 600 MG/1
TABLET, FILM COATED ORAL
COMMUNITY
Start: 2021-07-23 | End: 2023-03-22

## 2021-09-21 RX ORDER — OXYCODONE AND ACETAMINOPHEN 5; 325 MG/1; MG/1
1-2 TABLET ORAL
COMMUNITY
Start: 2021-07-23 | End: 2021-09-29

## 2021-09-21 NOTE — PROGRESS NOTES
"  SUBJECTIVE:       HPI: Hansa Ta is a 31 year old  who presents today for consultation for ovarian cyst, referred by Brigid Sahu PA-C.     Patient seen in the ER on  for pelvic pain.  +blood in stool prior to ER arrival, thought secondary to anal fissure.  CT in ER revealed complex cyst in pelvis. This was followed by ultrasound confirming 5.8x4.9x4.8 hemorrhagic cyst vs endometrioma. No evidence of torsion on ultrasound. Left ovary is surgically absent.  Patient was discharged home with percocet.    Since being seen in the ER, she has had persistent pelvic pain.     Hx appendectomy and cholecystectomy. Hx ovarian cysts. S/p left oopherectomy for ovarian cysts. States history endometriosis. Has previously taken Depo, OCPs.  \"H/o stage one endometriosis and ovarian cysts treated by laparoscopic cauterization 08 and laparoscopic cauterization/right ovarian cystectomy/D&C/hysteroscopy on 10/8/08.\"    Has been to the chiropractor, who noted\"inflammation in lower back.\"  Currently 6-7/10 for pain. Pain is on the right side and wraps around to her back.  Has had lower rib pain about 2 days, ago not improved.    Sensitive stomach, however, no constipation. BM every day, soft. Usually diarrhea, but now normal.    Using ibuprofen, heat for pain, with minimal relief.  She denies vaginal discharge, irregular bleeding, dysmenorrhea, increased sensitivity during sex. She denies fevers/chills, nausea/vomiting, bloating. Denies dysuria, hematuria, constipation or diarrhea.      Ob Hx:  s/p CSx2.      Gyn Hx: No LMP recorded (lmp unknown).    Patient is sexually active. One male partner   Last pap was 2018 nil     STI history denies  Using none for contraception.   Menarche 14 years old. Menses every 28 days. reg flow.  Family history of gyn-related malignancies: mother had uterine cancer, diagnosed in her 30s. Had a hysterectomy, no radiation or chemo. No other family history.         reports that " she has never smoked. She has never used smokeless tobacco.      Today's PHQ-2 Score:   PHQ-2 (  [employer]) 2020   Q1: Little interest or pleasure in doing things 1   Q2: Feeling down, depressed or hopeless 0   PHQ-2 Score 1   Q1: Little interest or pleasure in doing things Several days   Q2: Feeling down, depressed or hopeless Not at all   PHQ-2 Score 1     Today's PHQ-9 Score:   PHQ-9 SCORE 2021   PHQ-9 Total Score MyChart -   PHQ-9 Total Score 8     Today's SVEN-7 Score:   SVEN-7 SCORE 3/15/2021   Total Score 10 (moderate anxiety)   Total Score 10       Problem list and histories reviewed & adjusted, as indicated.  Additional history: as documented.    Patient Active Problem List   Diagnosis     Endometriosis     Ovarian cyst     CARDIOVASCULAR SCREENING; LDL GOAL LESS THAN 160     Post partum depression     Previous  delivery affecting pregnancy     SVEN (generalized anxiety disorder)     Iron deficiency anemia due to chronic blood loss     Adjustment disorder with depressed mood     Anxiety     Endometritis     Panic attack     RUQ abdominal pain     Sacroiliac joint pain     Wound infection     Chronic gout involving toe without tophus, unspecified cause, unspecified laterality     Past Surgical History:   Procedure Laterality Date     APPENDECTOMY       C/SECTION, LOW TRANSVERSE       LAPAROSCOPY DIAGNOSTIC (GYN)      4     OTHER SURGICAL HISTORY      left ovary removed, for cysts      Social History     Tobacco Use     Smoking status: Never Smoker     Smokeless tobacco: Never Used   Substance Use Topics     Alcohol use: Yes     Comment: 2-3 per week       Problem (# of Occurrences) Relation (Name,Age of Onset)    Cancer (1) Mother (35): ovarian    Diabetes (1) Maternal Grandmother    Fibromyalgia (1) Maternal Aunt    Lung Cancer (1) Father    Spine Problems (1) Mother: related to injury            ibuprofen (ADVIL/MOTRIN) 600 MG tablet, Take 1 tablet (600 mg) by mouth every 6 (six) hours as  needed (pain).  oxyCODONE-acetaminophen (PERCOCET) 5-325 MG tablet, Take 1-2 tablets by mouth  colchicine (COLCYRS) 0.6 MG tablet, Take 1 tablet (0.6 mg) by mouth daily (Patient not taking: Reported on 9/21/2021)  meloxicam (MOBIC) 7.5 MG tablet, Take 1 tablet (7.5 mg) by mouth daily (Patient not taking: Reported on 9/21/2021)  predniSONE (DELTASONE) 20 MG tablet, Take 3 tabs by mouth daily x 3 days, then 2 tabs daily x 3 days, then 1 tab daily x 3 days, then 1/2 tab daily x 3 days. (Patient not taking: Reported on 9/21/2021)    No current facility-administered medications on file prior to visit.    No Known Allergies    ROS:  10 Point review of systems negative other noted above in HPI    OBJECTIVE:     /75   Pulse 83   Wt 91.5 kg (201 lb 12.8 oz)   LMP  (LMP Unknown)   BMI 33.58 kg/m    Body mass index is 33.58 kg/m .      Gen: Alert, oriented, appropriately interactive, NAD  Eyes: Eyes grossly normal to inspection, conjunctivae and sclerae normal  CV: No edema  Resp: no audible wheeze, cough, or visible cyanosis.  No visible retractions or increased work of breathing.  Able to speak fully in complete sentences.  Abdomen: soft, non tender, non distended, no masses, no hernias.   External genitalia: no lesions; normal appearing external genitalia, bartholins glands, urethra, skenes glands  Vagina: no masses or lesions or discharge, normally rugated.  Cervix: no masses or lesions or discharge   Bimanual exam:   Nontender pelvic floor muscles  Urethra: nontender   Bladder: nontender and without massess, well supported   Uterus: midline, anteverted, small, mobile  no masses, non-tender  Adnexa: mild fullness right adnexa present with mild tenderness to palpation  No cervical motion tenderness. +tenderness without nodularity in posterior cul de sac  MSK: normal gait, symmetric movements UE & LE  Lower extremities: non-tender, no edema  Neuro: Cranial nerves grossly intact, mentation intact and speech  normal  Psych: mentation appears normal, affect normal/bright, judgement and insight intact, normal speech and appearance well-groomed      In-Clinic Test Results:  No results found for this or any previous visit (from the past 24 hour(s)).   Felipe Alejandro MD - 07/23/2021   Formatting of this note might be different from the original.   EXAM:  US PELVIS WITH IVT-NON OB     DATE:  7/23/2021 4:51 PM     CLINICAL DATA:  Pain Abnormal CT- evaluate for cyst/torsion     COMPARISON:  CT abdomen pelvis with contrast July 23, 2021     TECHNIQUE:  Real-time transabdominal and transvaginal scanning was performed.     FINDINGS:       UTERUS:  Normal. 9.0 x 4.2 x 4.9 cm     ENDOMETRIUM:  Normal in echotexture.  Endometrium measures 12 mm in thickness.     RIGHT OVARY:  There is a complex cyst with some internal echoes measuring 5.8 x 4.9 x 4.8 cm. A simple appearing cyst measures 4.1 x 2.7 x 4.4 cm.  The right ovary measures  11.0 cm X 5.3 cm X 5.9 cm. Right ovary demonstrates normal blood flow.       LEFT OVARY: Surgically absent      FLUID:   Trace amount of free fluid, likely physiologic.     IMPRESSION   IMPRESSION:       1. Complex right ovarian cyst measuring 5.8 x 4.9 x 4.8 cm probably representing hemorrhagic cyst or endometrioma. Simple cysts in the right ovary measuring 4.1 x 2.7 x 4.4 cm. Follow-up ultrasound in 8-10 weeks is suggested to ensure resolution.   2. No sonographic evidence of right ovarian torsion.   3. Left ovary surgically absent.   4. Normal appearance of the uterus and endometrium.     Report signed by Dr. Felipe Alejandro     Date: 07/23/21   Received From: [facility]      EXAM: CT ABDOMEN & PELVIS W/O ORAL W IV CON     DATE: 7/23/2021 3:47 PM     CLINICAL DATA: Right lower quadrant pain     ICD 10:     COMPARISON: 6/8/2015     TECHNIQUE: Helical images were obtained through the abdomen and pelvis after IV contrast administration.  Coronal and sagittal reformatted images were created. 100 cc of  Omnipaque 350.     FINDINGS: The heart size is normal. There is no pericardial effusion. The lung bases are clear bilaterally.     There are postsurgical changes consistent with cholecystectomy. The portal vein is patent.     The adrenal glands, pancreas, spleen and kidneys are normal.     There is no free intraperitoneal fluid.     There is complex multicystic lesion is posterior to the uterus measuring 8.9 x 4.8 x 7.7 cm. Pelvic ultrasound may be helpful to further evaluate; ovarian torsion is not excluded. No gross left adnexal mass.     No dilated bowel loops to suggest bowel obstruction. There is no retroperitoneal nor pelvic lymphadenopathy.     Small fat-containing umbilical hernia.     IMPRESSION   IMPRESSION: Complex multicystic lesion in the pelvic cul-de-sac could represent ovarian torsion. No free fluid. Recommend pelvic ultrasound to further characterize.     REPORT SIGNED BY DR. Emily Montanez     Date: 21   Received From: [facility]          ASSESSMENT/PLAN:                                                      Hansa Ta is a 31 year old  who presents today for consultation for ovarian cyst, referred by Brigid Sahu PA-C.       ICD-10-CM    1. Cyst of right ovary  N83.201 US Pelvic Complete with Transvaginal   2. Endometriosis  N80.9 norethindrone-ethinyl estradiol (MICROGESTIN ) 1-20 MG-MCG tablet     ketorolac (TORADOL) 10 MG tablet     Physical Therapy Referral   3. Screen for STD (sexually transmitted disease)  Z11.3 NEISSERIA GONORRHOEA PCR     CHLAMYDIA TRACHOMATIS PCR   4. Screening for malignant neoplasm of cervix  Z12.4 Pap screen reflex to HPV if ASCUS - recommend age 25 - 29   5. Pelvic pain in female  R10.2 Physical Therapy Referral       Pelvic pain with known right ovarian cyst. Exam today consistent with right ovarian cyst, endometriosis. Wet prep completed and negative. Follow-up ultrasound, Gc/ct recommended. Pap due, therefore also recommended today.  If enlargement of cyst, reasonable to plan for ovarian cystectomy. However, patient cautioned possible risk of oopherectomy with this procedure and she only has one ovary remaining. If oopherectomy performed, would need to be on HRT. Would not recommend oopherectomy given age unless no other options or concerning findings during laparoscopy. Concerning s/s reviewed, when to return to the ER. Also reviewed option for [facility] for potential ppx for ovarian cysts given history. Discussed treatment options for endometriosis, with hormonal manipulation, +/- Depo Lupron or Orilissa. May also consider pelvic PT as adjuvant treatment.   After discussing RBA, patient open to pelvic PT and OCPs. Would like to try something other than Motrin for pain, therefore oral toradol sent in. Discussed recommendation to avoid narcotic medications if possible. Patient to follow-up in 3 months to discuss progress, longer term management. Not yet able to rule out future pregnancy and therefore declines definitive management. Will contact office sooner if symptoms worsen or having other concerning symptoms.        Thank you for allowing me to participate in the care of your patient.           I have personally reviewed her ER progress notes, CT and pelvic ultrasound reports, prior operative reports.    60 minutes spent on the date of the encounter doing chart review, history and exam, documentation and further activities as noted above       Teresa Stafford DO  [facility]

## 2021-09-22 ENCOUNTER — ANCILLARY PROCEDURE (OUTPATIENT)
Dept: ULTRASOUND IMAGING | Facility: OTHER | Age: 31
End: 2021-09-22
Attending: OBSTETRICS & GYNECOLOGY
Payer: OTHER GOVERNMENT

## 2021-09-22 DIAGNOSIS — N83.201 CYST OF RIGHT OVARY: ICD-10-CM

## 2021-09-22 LAB
C TRACH DNA SPEC QL NAA+PROBE: NEGATIVE
N GONORRHOEA DNA SPEC QL NAA+PROBE: NEGATIVE

## 2021-09-22 PROCEDURE — 76830 TRANSVAGINAL US NON-OB: CPT | Performed by: RADIOLOGY

## 2021-09-22 PROCEDURE — 76856 US EXAM PELVIC COMPLETE: CPT | Performed by: RADIOLOGY

## 2021-09-23 LAB
BKR LAB AP GYN ADEQUACY: NORMAL
BKR LAB AP GYN INTERPRETATION: NORMAL
BKR LAB AP HPV REFLEX: NORMAL
BKR LAB AP PREVIOUS ABNORMAL: NORMAL
PATH REPORT.COMMENTS IMP SPEC: NORMAL
PATH REPORT.RELEVANT HX SPEC: NORMAL

## 2021-09-24 ENCOUNTER — TELEPHONE (OUTPATIENT)
Dept: OBGYN | Facility: CLINIC | Age: 31
End: 2021-09-24

## 2021-09-24 DIAGNOSIS — N80.9 ENDOMETRIOSIS: Primary | ICD-10-CM

## 2021-09-24 DIAGNOSIS — N83.201 CYST OF RIGHT OVARY: ICD-10-CM

## 2021-09-24 DIAGNOSIS — Z11.52 ENCOUNTER FOR PREPROCEDURE SCREENING LABORATORY TESTING FOR COVID-19: Primary | ICD-10-CM

## 2021-09-24 DIAGNOSIS — Z01.812 ENCOUNTER FOR PREPROCEDURE SCREENING LABORATORY TESTING FOR COVID-19: Primary | ICD-10-CM

## 2021-09-24 NOTE — TELEPHONE ENCOUNTER
PB DOS: 10/5/2021  Type of Procedure: diagnostic laparoscopy, ovarian cystectomy, possible ovarian aspiration or oopherectomy, fulgaration of endometriosis  CPT Codes: 38014, 33663,86295  ICD10 Codes:   Endometriosis [N80.9]  - Primary       Cyst of right ovary [N83.201]           Surgeon/Ordering provider: Dr. Stafford, [id number]  Pre-cert/Authorization completed:  Pending review  Payer: [employer]   Date Checked: 9/24/2021  Spoke to [facility] portal  Ref. # Form has been Submitted successfully with AuthID : [id number]  / Auth # pending  Valid Dates: pending

## 2021-09-24 NOTE — TELEPHONE ENCOUNTER
Associated Diagnoses    Endometriosis [N80.9]  - Primary       Cyst of right ovary [N83.201]           Source Order Set    Order Set Name Order ID    [id number]     Detailed Information    Priority and Order Details             Order Questions    Question Answer Comment   Procedure name(s) - multi select diagnostic laparoscopy, ovarian cystectomy, possible ovarian aspiration or oopherectomy, fulgaration of endometriosis    Reason for procedure pelvic pain, ovarian cyst, endometriosis    Surgeon: Teresa Stafford DO    Is this a multi surgeon case? No    Laterality N/A    Request for additional equipment Other (see comments) None   Anesthesia General    Initiate Pre-op orders for above procedure: Yes, as ordered in Epic Additional orders noted there also   Location of Case: [facility]    Operating room  requested: Yes    Urgency of Surgery: Urgent    Surgeon Procedure Time (incision to closure) in minutes (per procedure as applicable) 60    Note:  Surgical Case Time Needed (in minutes)   Patient Class (for admit prior to surgery, specify number of days in comments): Same day (hospital outpatient)    Why can t this outpatient surgery be done at the [facility] or  ASC? surgeon preference    H&P To Be Completed By: PCP    Post-Op Appointment 2 weeks    Vendor Needed? No      SURGERY SCHEDULING AND PRECERTIFICATION    Medical Record Number: [id number]  Hansa Ta  YOB: 1990   Phone: [phone] (home) [phone] (work)  Primary Provider: No Ref-Primary, Physician    Reason for Admit:  Reason for procedure pelvic pain, ovarian cyst, endometriosis     ICD-10 CODE:   Endometriosis [N80.9]  - Primary       Cyst of right ovary [N83.201]         Surgeon: Teresa Stafford DO  Surgical Procedure:   Procedure name(s) - multi select diagnostic laparoscopy, ovarian cystectomy, possible ovarian aspiration or oopherectomy, fulgaration of endometriosis     Date of Surgery 10/5 Time of  Surgery 7:30 a.m.  Surgery to be performed at:  [facility]  Status: Outpatient  Type of Anesthesia Anticipated: General    Sterilization consent:  Not applicable to procedure being performed.    Pre-Op: On  with Hansa Morales at [place] 11:20 a.m.  Post-Op:  10/18 8:45 a.m. Dr. Stafford [place]  COVID testin/29 11:15 [facility]    Pre-certification routed to [employer]:  Yes    Surgery packet mailed to patient's home address: Yes  Patient instructed NPO 12 hours prior to surgery, arrive 1.5 hour(s) prior to surgery, must have a .  Patient understood and agrees to the plan.      Requestor:  Waleska Alston     Location:  [place] [id number]

## 2021-09-28 NOTE — PATIENT INSTRUCTIONS

## 2021-09-28 NOTE — PROGRESS NOTES
[facility]  [address], [address]  [place] [address]  Phone: [phone]  Fax: [phone]  Primary Provider: No Ref-Primary, Physician  Pre-op Performing Provider: HANSA CHARLES      PREOPERATIVE EVALUATION:  Today's date: 9/29/2021    Hansa Ta is a 31 year old female who presents for a preoperative evaluation.    Surgical Information:  Surgery/Procedure: DIAGNOSTIC LAPAROSCOPY, OVARIAN CYSTECTOMY, POSSIBLE OVARIAN ASPIRATIN OR OOPHORECTOMY, FULGURATION OF ENDOMETRIOSIS  Surgery Location: [facility]  Surgeon: Rivera  Surgery Date: 10/5/2021  Time of Surgery: 7:30am  Where patient plans to recover: At home with family  Fax number for surgical facility: [phone]    Type of Anesthesia Anticipated: to be determined        Assessment & Plan     The proposed surgical procedure is considered INTERMEDIATE risk.    Preop general physical exam  - HCG Qual, Urine ([id number]); Future  - HCG Qual, Urine ([id number])    Cyst of ovary, unspecified laterality  Surgery as scheduled with OB/GYN    High priority for 2019-nCoV vaccine  She declines vaccination           Risks and Recommendations:  The patient has the following additional risks and recommendations for perioperative complications:   - No identified additional risk factors other than previously addressed    Medication Instructions:  Patient is to take all scheduled medications on the day of surgery EXCEPT for modifications listed below:  colchine     She was advised to hold NSAIDs 7 days prior to surgery.     RECOMMENDATION:  APPROVAL GIVEN to proceed with proposed procedure, without further diagnostic evaluation.    Hansa Charles PA-C      Subjective     HPI related to upcoming procedure:  Has had 5 surgeries for ovarian cysts and endometriosis. Chronic pelvic pain from these sx. She is on OCP. Patient's last menstrual period was 09/12/2021 (approximate).         Preop Questions 9/29/2021   1. Have you  ever had a heart attack or stroke? No   2. Have you ever had surgery on your heart or blood vessels, such as a stent placement, a coronary artery bypass, or surgery on an artery in your head, neck, heart, or legs? No   3. Do you have chest pain with activity? No   4. Do you have a history of  heart failure? No   5. Do you currently have a cold, bronchitis or symptoms of other infection? No   6. Do you have a cough, shortness of breath, or wheezing? No   7. Do you or anyone in your family have previous history of blood clots? No   8. Do you or does anyone in your family have a serious bleeding problem such as prolonged bleeding following surgeries or cuts? No   9. Have you ever had problems with anemia or been told to take iron pills? YES - not recently- anemia after last baby born 2018.    10. Have you had any abnormal blood loss such as black, tarry or bloody stools, or abnormal vaginal bleeding? No   11. Have you ever had a blood transfusion? No   12. Are you willing to have a blood transfusion if it is medically needed before, during, or after your surgery? Yes   13. Have you or any of your relatives ever had problems with anesthesia? No   14. Do you have sleep apnea, excessive snoring or daytime drowsiness? No   15. Do you have any artifical heart valves or other implanted medical devices like a pacemaker, defibrillator, or continuous glucose monitor? No   16. Do you have artificial joints? No   17. Are you allergic to latex? No   18. Is there any chance that you may be pregnant? No     Health Care Directive:  Patient does not have a Health Care Directive or Living Will: Discussed advance care planning with patient; however, patient declined at this time.    Preoperative Review of :   reviewed - no record of controlled substances prescribed.      For exercise: gym 5d a week- light cardio and then lifting weights; feels good with exercise. Limitations with exercise due to: current pelvic pain/cyst sx.  Denies CV sxs with exercise including CP, SOB, palpitations, HERCULES, presyncope.    Pt denies any personal medical history of diabetes, hypertension, hyperlipidemia, thyroid problems, CKD, irregular heartbeat/a.fib, CAD/PVD, sleep apnea, Asthma/COPD, DVT/PE. Patient is a nonsmoker.    Patient's last menstrual period was 2021 (approximate).   On OCP.     On colchicine daily for gout. Has been helpful       Review of Systems  CONSTITUTIONAL: NEGATIVE for fever, chills, change in weight  INTEGUMENTARY/SKIN: NEGATIVE for worrisome rashes, moles or lesions  EYES: NEGATIVE for vision changes or irritation  ENT/MOUTH: NEGATIVE for ear, mouth and throat problems  RESP: NEGATIVE for significant cough or SOB  CV: NEGATIVE for chest pain, palpitations or peripheral edema  GI: NEGATIVE for nausea, abdominal pain, heartburn, or change in bowel habits  : NEGATIVE for frequency, dysuria, or hematuria  MUSCULOSKELETAL: NEGATIVE for significant arthralgias or myalgia  NEURO: NEGATIVE for weakness, dizziness or paresthesias  ENDOCRINE: NEGATIVE for temperature intolerance, skin/hair changes  HEME: NEGATIVE for bleeding problems  PSYCHIATRIC: NEGATIVE for changes in mood or affect  +seasonal allergic rhinitis- nasal congestion and itchy eyes.  + back pain        Patient Active Problem List    Diagnosis Date Noted     Chronic gout involving toe without tophus, unspecified cause, unspecified laterality 2021     Priority: Medium     SVEN (generalized anxiety disorder) 2018     Priority: Medium     Previous  delivery affecting pregnancy 10/27/2017     Priority: Medium     Sacroiliac joint pain 2016     Priority: Medium     RUQ abdominal pain 2016     Priority: Medium     Iron deficiency anemia due to chronic blood loss 2015     Priority: Medium     Anxiety 2015     Priority: Medium     Panic attack 2015     Priority: Medium     Post partum depression 2015     Priority: Medium      Wound infection 08/20/2015     Priority: Medium     Endometritis 08/18/2015     Priority: Medium     Endometriosis 12/04/2014     Priority: Medium     Ovarian cyst 12/04/2014     Priority: Medium     Problem list name updated by automated process. Provider to review       CARDIOVASCULAR SCREENING; LDL GOAL LESS THAN 160 12/04/2014     Priority: Medium     Adjustment disorder with depressed mood 04/15/2008     Priority: Medium      Past Medical History:   Diagnosis Date     Endometriosis      Ovarian cyst      Past Surgical History:   Procedure Laterality Date     APPENDECTOMY       C/SECTION, LOW TRANSVERSE       LAPAROSCOPY DIAGNOSTIC (GYN)      4     OTHER SURGICAL HISTORY      left ovary removed, for cysts     Current Outpatient Medications   Medication Sig Dispense Refill     colchicine (COLCYRS) 0.6 MG tablet Take 1 tablet (0.6 mg) by mouth daily (Patient not taking: Reported on 9/21/2021) 90 tablet 1     ibuprofen (ADVIL/MOTRIN) 600 MG tablet Take 1 tablet (600 mg) by mouth every 6 (six) hours as needed (pain).       ketorolac (TORADOL) 10 MG tablet Take 1 tablet (10 mg) by mouth every 6 hours as needed for moderate pain 60 tablet 0     meloxicam (MOBIC) 7.5 MG tablet Take 1 tablet (7.5 mg) by mouth daily (Patient not taking: Reported on 9/21/2021) 90 tablet 1     norethindrone-ethinyl estradiol (MICROGESTIN 1/20) 1-20 MG-MCG tablet Take 1 tablet by mouth daily 90 tablet 0     oxyCODONE-acetaminophen (PERCOCET) 5-325 MG tablet Take 1-2 tablets by mouth       predniSONE (DELTASONE) 20 MG tablet Take 3 tabs by mouth daily x 3 days, then 2 tabs daily x 3 days, then 1 tab daily x 3 days, then 1/2 tab daily x 3 days. (Patient not taking: Reported on 9/21/2021) 20 tablet 0       No Known Allergies     Social History     Tobacco Use     Smoking status: Never Smoker     Smokeless tobacco: Never Used   Substance Use Topics     Alcohol use: Yes     Comment: 2-3 per week      Family History   Problem Relation Age of  "Onset     Spine Problems Mother         related to injury     Cancer Mother 35        ovarian     Lung Cancer Father      Diabetes Maternal Grandmother      Fibromyalgia Maternal Aunt      History   Drug Use No         Objective     /70 (BP Location: Left arm, Patient Position: Chair, Cuff Size: Adult Regular)   Pulse 92   Temp 99.4  F (37.4  C) (Temporal)   Resp 18   Ht 1.632 m (5' 4.25\")   Wt 88.9 kg (196 lb)   LMP 09/12/2021 (Approximate)   SpO2 97%   Breastfeeding No   BMI 33.38 kg/m      Physical Exam    GENERAL APPEARANCE: healthy, alert and no distress     EYES: EOMI, PERRL     HENT: ear canals and TM's normal and nose and mouth without ulcers or lesions     NECK: no adenopathy, no asymmetry, masses, or scars and thyroid normal to palpation     RESP: lungs clear to auscultation - no rales, rhonchi or wheezes     CV: regular rates and rhythm, normal S1 S2, no S3 or S4 and no murmur, click or rub     ABDOMEN:  soft, nontender, no HSM or masses and bowel sounds normal     MS: extremities normal- no gross deformities noted, no evidence of inflammation in joints, FROM in all extremities.     SKIN: no suspicious lesions or rashes     NEURO: Normal strength and tone, sensory exam grossly normal, mentation intact and speech normal     PSYCH: mentation appears normal. and affect normal/bright     LYMPHATICS: No cervical adenopathy    Recent Labs   Lab Test 10/13/20  1206   HGB 13.5         POTASSIUM 4.0   CR 0.76        Diagnostics:  Recent Results (from the past 24 hour(s))   HCG Qual, Urine ([id number])    Collection Time: 09/29/21 11:55 AM   Result Value Ref Range    hCG Urine Qualitative Negative Negative      No EKG required, no history of coronary heart disease, significant arrhythmia, peripheral arterial disease or other structural heart disease.    Revised Cardiac Risk Index (RCRI):  The patient has the following serious cardiovascular risks for perioperative complications:   - No " serious cardiac risks = 0 points     RCRI Interpretation: 0 points: Class I (very low risk - 0.4% complication rate)           Signed Electronically by: Hansa Morales PA-C  Copy of this evaluation report is provided to requesting physician.          ]  Answers for HPI/ROS submitted by the patient on 9/29/2021  If you checked off any problems, how difficult have these problems made it for you to do your work, take care of things at home, or get along with other people?: Somewhat difficult  PHQ9 TOTAL SCORE: 5  SVEN 7 TOTAL SCORE: 5

## 2021-09-29 ENCOUNTER — OFFICE VISIT (OUTPATIENT)
Dept: FAMILY MEDICINE | Facility: CLINIC | Age: 31
End: 2021-09-29
Payer: OTHER GOVERNMENT

## 2021-09-29 ENCOUNTER — LAB (OUTPATIENT)
Dept: LAB | Facility: CLINIC | Age: 31
End: 2021-09-29

## 2021-09-29 VITALS
TEMPERATURE: 99.4 F | SYSTOLIC BLOOD PRESSURE: 108 MMHG | WEIGHT: 196 LBS | HEIGHT: 64 IN | RESPIRATION RATE: 18 BRPM | OXYGEN SATURATION: 97 % | BODY MASS INDEX: 33.46 KG/M2 | DIASTOLIC BLOOD PRESSURE: 70 MMHG | HEART RATE: 92 BPM

## 2021-09-29 DIAGNOSIS — Z11.52 ENCOUNTER FOR PREPROCEDURE SCREENING LABORATORY TESTING FOR COVID-19: ICD-10-CM

## 2021-09-29 DIAGNOSIS — Z01.812 ENCOUNTER FOR PREPROCEDURE SCREENING LABORATORY TESTING FOR COVID-19: ICD-10-CM

## 2021-09-29 DIAGNOSIS — N83.209 CYST OF OVARY, UNSPECIFIED LATERALITY: ICD-10-CM

## 2021-09-29 DIAGNOSIS — Z01.818 PREOP GENERAL PHYSICAL EXAM: Primary | ICD-10-CM

## 2021-09-29 DIAGNOSIS — Z23 HIGH PRIORITY FOR 2019-NCOV VACCINE: ICD-10-CM

## 2021-09-29 LAB — HCG UR QL: NEGATIVE

## 2021-09-29 PROCEDURE — 99214 OFFICE O/P EST MOD 30 MIN: CPT | Performed by: PHYSICIAN ASSISTANT

## 2021-09-29 PROCEDURE — U0003 INFECTIOUS AGENT DETECTION BY NUCLEIC ACID (DNA OR RNA); SEVERE ACUTE RESPIRATORY SYNDROME CORONAVIRUS 2 (SARS-COV-2) (CORONAVIRUS DISEASE [COVID-19]), AMPLIFIED PROBE TECHNIQUE, MAKING USE OF HIGH THROUGHPUT TECHNOLOGIES AS DESCRIBED BY CMS-2020-01-R: HCPCS

## 2021-09-29 PROCEDURE — U0005 INFEC AGEN DETEC AMPLI PROBE: HCPCS

## 2021-09-29 PROCEDURE — 81025 URINE PREGNANCY TEST: CPT | Performed by: PHYSICIAN ASSISTANT

## 2021-09-29 ASSESSMENT — ANXIETY QUESTIONNAIRES
1. FEELING NERVOUS, ANXIOUS, OR ON EDGE: SEVERAL DAYS
4. TROUBLE RELAXING: SEVERAL DAYS
GAD7 TOTAL SCORE: 5
7. FEELING AFRAID AS IF SOMETHING AWFUL MIGHT HAPPEN: NOT AT ALL
2. NOT BEING ABLE TO STOP OR CONTROL WORRYING: NOT AT ALL
GAD7 TOTAL SCORE: 5
8. IF YOU CHECKED OFF ANY PROBLEMS, HOW DIFFICULT HAVE THESE MADE IT FOR YOU TO DO YOUR WORK, TAKE CARE OF THINGS AT HOME, OR GET ALONG WITH OTHER PEOPLE?: SOMEWHAT DIFFICULT
7. FEELING AFRAID AS IF SOMETHING AWFUL MIGHT HAPPEN: NOT AT ALL
6. BECOMING EASILY ANNOYED OR IRRITABLE: SEVERAL DAYS
5. BEING SO RESTLESS THAT IT IS HARD TO SIT STILL: SEVERAL DAYS
GAD7 TOTAL SCORE: 5
3. WORRYING TOO MUCH ABOUT DIFFERENT THINGS: SEVERAL DAYS

## 2021-09-29 ASSESSMENT — MIFFLIN-ST. JEOR: SCORE: 1603.02

## 2021-09-29 ASSESSMENT — PATIENT HEALTH QUESTIONNAIRE - PHQ9
SUM OF ALL RESPONSES TO PHQ QUESTIONS 1-9: 5
10. IF YOU CHECKED OFF ANY PROBLEMS, HOW DIFFICULT HAVE THESE PROBLEMS MADE IT FOR YOU TO DO YOUR WORK, TAKE CARE OF THINGS AT HOME, OR GET ALONG WITH OTHER PEOPLE: SOMEWHAT DIFFICULT
SUM OF ALL RESPONSES TO PHQ QUESTIONS 1-9: 5

## 2021-09-29 ASSESSMENT — PAIN SCALES - GENERAL: PAINLEVEL: NO PAIN (0)

## 2021-09-30 LAB — SARS-COV-2 RNA RESP QL NAA+PROBE: NEGATIVE

## 2021-09-30 ASSESSMENT — PATIENT HEALTH QUESTIONNAIRE - PHQ9: SUM OF ALL RESPONSES TO PHQ QUESTIONS 1-9: 5

## 2021-09-30 ASSESSMENT — ANXIETY QUESTIONNAIRES: GAD7 TOTAL SCORE: 5

## 2021-10-25 ENCOUNTER — TELEPHONE (OUTPATIENT)
Dept: OBGYN | Facility: CLINIC | Age: 31
End: 2021-10-25

## 2021-10-25 ENCOUNTER — OFFICE VISIT (OUTPATIENT)
Dept: OBGYN | Facility: CLINIC | Age: 31
End: 2021-10-25
Payer: OTHER GOVERNMENT

## 2021-10-25 VITALS
SYSTOLIC BLOOD PRESSURE: 108 MMHG | BODY MASS INDEX: 33.74 KG/M2 | DIASTOLIC BLOOD PRESSURE: 76 MMHG | HEART RATE: 75 BPM | WEIGHT: 198.1 LBS

## 2021-10-25 DIAGNOSIS — G89.29 CHRONIC PELVIC PAIN IN FEMALE: ICD-10-CM

## 2021-10-25 DIAGNOSIS — Z98.890 POSTOPERATIVE STATE: Primary | ICD-10-CM

## 2021-10-25 DIAGNOSIS — R10.2 CHRONIC PELVIC PAIN IN FEMALE: ICD-10-CM

## 2021-10-25 PROCEDURE — 99213 OFFICE O/P EST LOW 20 MIN: CPT | Performed by: OBSTETRICS & GYNECOLOGY

## 2021-10-25 NOTE — PROGRESS NOTES
SUBJECTIVE:       HPI: Hansa Ta is a 31 year old  is s/p dx lap, ovarian cyst aspiration on 10/5/21 for pelvic pain, ovarian cyst. Since surgery, she has been doing much better. She has some low back pain secondary to posture sitting long hours for work, and is planning to return to the gym soon. She does however, report significant improvement in pelvic pain    She is not interested in contraception.  Stopped the OCPs 2/ SE    Ob Hx:     Gyn Hx: Patient's last menstrual period was 10/10/2021.     Last pap was 2021 nil         Today's PHQ-2 Score:   PHQ-2 (  [employer]) 2020   Q1: Little interest or pleasure in doing things 1   Q2: Feeling down, depressed or hopeless 0   PHQ-2 Score 1   Q1: Little interest or pleasure in doing things Several days   Q2: Feeling down, depressed or hopeless Not at all   PHQ-2 Score 1     Today's PHQ-9 Score:   PHQ-9 SCORE 2021   PHQ-9 Total Score MyChart 5 (Mild depression)   PHQ-9 Total Score 5     Today's SVEN-7 Score:   SVEN-7 SCORE 2021   Total Score 5 (mild anxiety)   Total Score 5       Problem list and histories reviewed & adjusted, as indicated.  Additional history: as documented.    Patient Active Problem List   Diagnosis     Endometriosis     Ovarian cyst     CARDIOVASCULAR SCREENING; LDL GOAL LESS THAN 160     Post partum depression     Previous  delivery affecting pregnancy     SVEN (generalized anxiety disorder)     Iron deficiency anemia due to chronic blood loss     Adjustment disorder with depressed mood     Anxiety     Endometritis     Panic attack     RUQ abdominal pain     Sacroiliac joint pain     Wound infection     Chronic gout involving toe without tophus, unspecified cause, unspecified laterality     Past Surgical History:   Procedure Laterality Date     APPENDECTOMY       C/SECTION, LOW TRANSVERSE       LAPAROSCOPY DIAGNOSTIC (GYN)      4     OTHER SURGICAL HISTORY      left ovary removed, for cysts      Social  History     Tobacco Use     Smoking status: Never Smoker     Smokeless tobacco: Never Used   Substance Use Topics     Alcohol use: Yes     Comment: 2-3 per week       Problem (# of Occurrences) Relation (Name,Age of Onset)    Cancer (1) Mother (35): ovarian    Diabetes (1) Maternal Grandmother    Fibromyalgia (1) Maternal Aunt    Lung Cancer (1) Father    Spine Problems (1) Mother: related to injury            colchicine (COLCYRS) 0.6 MG tablet, Take 1 tablet (0.6 mg) by mouth daily  ibuprofen (ADVIL/MOTRIN) 600 MG tablet, Take 1 tablet (600 mg) by mouth every 6 (six) hours as needed (pain).  meloxicam (MOBIC) 7.5 MG tablet, Take 1 tablet (7.5 mg) by mouth daily  ketorolac (TORADOL) 10 MG tablet, Take 1 tablet (10 mg) by mouth every 6 hours as needed for moderate pain (Patient not taking: Reported on 10/25/2021)  norethindrone-ethinyl estradiol (MICROGESTIN ) 1-20 MG-MCG tablet, Take 1 tablet by mouth daily (Patient not taking: Reported on 10/25/2021)    No current facility-administered medications on file prior to visit.    No Known Allergies    ROS:  10 Point review of systems negative other noted above in HPI    OBJECTIVE:     /76   Pulse 75   Wt 89.9 kg (198 lb 1.6 oz)   LMP 10/10/2021   BMI 33.74 kg/m    Body mass index is 33.74 kg/m .      Gen: Alert, oriented, appropriately interactive, NAD  Chest: Symmetrical, unlabored breathing  Abdomen: soft, non tender, non distended, no masses, no hernias.   Pelvic: Deferred  MSK: normal gait, symmetric movements UE & LE  Lower extremities: non-tender, no edema      In-Clinic Test Results:  No results found for this or any previous visit (from the past 24 hour(s)).    ASSESSMENT/PLAN:                                                      Hansa Ta is a 31 year old  s/p dx lap, ovarian cyst aspiration on 10/5/21 for pelvic pain, ovarian cyst, improved      ICD-10-CM    1. Postoperative state  Z98.890    2. Chronic pelvic pain in female  R10.2  Elagolix Sodium 150 MG TABS    G89.29        Doing well postoperatively, with significant improvement in symptoms.    Cleared for routine to normal activity, including intercourse.     Contraception - stopped OCPs due to unwanted SE (bloating, mood SE, cramping?). Given history chronic pelvic pain, reasonable to trial Orilissa at this time. Patient open to this. Discussed potential SE and when to return to clinic as well as time limitations of medication and potential need for add-back therapy. All questions answered.    Follow-up 6 months to reassess, can be VV      Teresa Stafford DO  [facility] [place]

## 2021-10-25 NOTE — TELEPHONE ENCOUNTER
Prior Authorization Retail Medication Request    Medication/Dose: Orilissa 150mg  ICD code (if different than what is on RX):    Previously Tried and Failed:    Rationale:      Insurance Name:    Insurance ID:  [id number]      Pharmacy Information (if different than what is on RX)  Name:  [facility]  Phone:  [phone]    Prior Authorization required    Thank you,  Kecia Calderon, [facility] [place]

## 2021-10-26 NOTE — TELEPHONE ENCOUNTER
Central Prior Authorization Team   Phone: [phone]      PA Initiation    Medication: Orilissa 150mg  Insurance Company:  - Phone [phone] Fax [phone]  Pharmacy Filling the Rx: [address]   Filling Pharmacy Phone: [phone]  Filling Pharmacy Fax:    Start Date: 10/26/2021

## 2021-10-26 NOTE — TELEPHONE ENCOUNTER
Prior Authorization Approval    Authorization Effective Date: 9/26/2021  Authorization Expiration Date: 7/23/2022  Medication: Orilissa 150mg-APPROVED  Approved Dose/Quantity:   Reference #:     Insurance Company: [employer] [phone] Fax [phone]  Expected CoPay:       CoPay Card Available:      Foundation Assistance Needed:    Which Pharmacy is filling the prescription (Not needed for infusion/clinic administered): [facility] [address]   Pharmacy Notified: Yes  Patient Notified: No    Pharmacy will notify patient when medication is ready.

## 2021-11-04 ENCOUNTER — MYC MEDICAL ADVICE (OUTPATIENT)
Dept: FAMILY MEDICINE | Facility: CLINIC | Age: 31
End: 2021-11-04

## 2022-01-23 ENCOUNTER — HEALTH MAINTENANCE LETTER (OUTPATIENT)
Age: 32
End: 2022-01-23

## 2022-01-26 ENCOUNTER — LAB (OUTPATIENT)
Dept: LAB | Facility: OTHER | Age: 32
End: 2022-01-26
Payer: OTHER GOVERNMENT

## 2022-01-26 DIAGNOSIS — N89.8 VAGINAL DISCHARGE: ICD-10-CM

## 2022-01-26 LAB
CLUE CELLS: PRESENT
TRICHOMONAS, WET PREP: ABNORMAL
WBC'S/HIGH POWER FIELD, WET PREP: ABNORMAL
YEAST, WET PREP: ABNORMAL

## 2022-01-26 PROCEDURE — 87210 SMEAR WET MOUNT SALINE/INK: CPT

## 2022-02-08 ENCOUNTER — LAB (OUTPATIENT)
Dept: LAB | Facility: OTHER | Age: 32
End: 2022-02-08
Payer: OTHER GOVERNMENT

## 2022-02-08 DIAGNOSIS — N89.8 VAGINAL DISCHARGE: ICD-10-CM

## 2022-02-08 PROCEDURE — 87210 SMEAR WET MOUNT SALINE/INK: CPT

## 2022-03-07 ENCOUNTER — VIRTUAL VISIT (OUTPATIENT)
Dept: FAMILY MEDICINE | Facility: CLINIC | Age: 32
End: 2022-03-07
Payer: OTHER GOVERNMENT

## 2022-03-07 DIAGNOSIS — R21 RASH: ICD-10-CM

## 2022-03-07 DIAGNOSIS — B96.89 BACTERIAL VAGINOSIS: ICD-10-CM

## 2022-03-07 DIAGNOSIS — N76.0 BACTERIAL VAGINOSIS: ICD-10-CM

## 2022-03-07 DIAGNOSIS — B37.31 YEAST INFECTION OF THE VAGINA: Primary | ICD-10-CM

## 2022-03-07 PROCEDURE — 99441 PR PHYSICIAN TELEPHONE EVALUATION 5-10 MIN: CPT | Mod: TEL | Performed by: PHYSICIAN ASSISTANT

## 2022-03-07 RX ORDER — TRIAMCINOLONE ACETONIDE 1 MG/G
CREAM TOPICAL 2 TIMES DAILY
Qty: 30 G | Refills: 0 | Status: SHIPPED | OUTPATIENT
Start: 2022-03-07 | End: 2022-12-01

## 2022-03-07 RX ORDER — METRONIDAZOLE 7.5 MG/G
1 GEL VAGINAL WEEKLY
Qty: 70 G | Refills: 1 | Status: SHIPPED | OUTPATIENT
Start: 2022-03-07 | End: 2022-11-10

## 2022-03-07 RX ORDER — FLUCONAZOLE 150 MG/1
150 TABLET ORAL DAILY
Qty: 3 TABLET | Refills: 0 | Status: SHIPPED | OUTPATIENT
Start: 2022-03-07 | End: 2022-03-10

## 2022-03-07 NOTE — PROGRESS NOTES
Hansa is a 31 year old who is being evaluated via a billable telephone visit.      What phone number would you like to be contacted at? [phone]  How would you like to obtain your AVS? Kristla Chase   Hansa is a 31 year old who presents for the following health issues    HPI     Follow up vaginal problems, still having discomfort   Unsure if BV resolved and now has a yeast infection. Really itchy and uncomfortable. Had to be treated with 2 rounds of antibiotics as BV was recurrent.     Rash  Onset/Duration: 1.5 week, went in the hot tub at gym and rash started next day  Description  Location: arms & legs  Character: round, raised, red  Itching: moderate  Intensity:  moderate  Progression of Symptoms:  same  Accompanying signs and symptoms:   Fever: no  Body aches or joint pain: no  Sore throat symptoms: no  Recent cold symptoms: no  History:           Previous episodes of similar rash: None  New exposures:  no  Recent travel: no  Exposure to similar rash: no  Precipitating or alleviating factors:   Therapies tried and outcome: Benadryl/diphenhydramine       Review of Systems   Constitutional, HEENT, cardiovascular, pulmonary, GI, , musculoskeletal, neuro, skin, endocrine and psych systems are negative, except as otherwise noted.      Objective           Vitals:  No vitals were obtained today due to virtual visit.    Physical Exam   healthy, alert and no distress  PSYCH: Alert and oriented times 3; coherent speech, normal   rate and volume, able to articulate logical thoughts, able   to abstract reason, no tangential thoughts, no hallucinations   or delusions  Her affect is normal and pleasant  RESP: No cough, no audible wheezing, able to talk in full sentences  Remainder of exam unable to be completed due to telephone visits    Assessment & Plan     Yeast infection of the vagina  Will have patient treat with oral Diflucan.   - fluconazole (DIFLUCAN) 150 MG tablet; Take 1 tablet (150 mg) by mouth daily  for 3 days    Rash  Likely eczema. Discussed topical steroid as needed. Encouraged keeping skin well hydrated. Patient will follow-up in clinic if new symptoms develop or current symptoms fail to improve.  - triamcinolone (KENALOG) 0.1 % external cream; Apply topically 2 times daily    Bacterial vaginosis  Will have patient treat once weekly as this has been recurrent issue for her.   - metroNIDAZOLE (METROGEL) 0.75 % vaginal gel; Place 1 applicator (5 g) vaginally once a week    The patient indicates understanding of these issues and agrees with the plan.    Brigid Sahu PA-C  [facility]    Phone call duration: 5 minutes

## 2022-03-16 ENCOUNTER — LAB (OUTPATIENT)
Dept: LAB | Facility: CLINIC | Age: 32
End: 2022-03-16
Payer: OTHER GOVERNMENT

## 2022-03-16 DIAGNOSIS — R53.83 OTHER FATIGUE: ICD-10-CM

## 2022-03-16 DIAGNOSIS — N89.8 VAGINAL IRRITATION: ICD-10-CM

## 2022-03-16 LAB
ALBUMIN SERPL-MCNC: 4.7 G/DL (ref 3.4–5)
ALBUMIN UR-MCNC: NEGATIVE MG/DL
ALP SERPL-CCNC: 65 U/L (ref 40–150)
ALT SERPL W P-5'-P-CCNC: 18 U/L (ref 0–50)
ANION GAP SERPL CALCULATED.3IONS-SCNC: 4 MMOL/L (ref 3–14)
APPEARANCE UR: CLEAR
AST SERPL W P-5'-P-CCNC: 12 U/L (ref 0–45)
BILIRUB SERPL-MCNC: 0.6 MG/DL (ref 0.2–1.3)
BILIRUB UR QL STRIP: NEGATIVE
BUN SERPL-MCNC: 9 MG/DL (ref 7–30)
CALCIUM SERPL-MCNC: 9.5 MG/DL (ref 8.5–10.1)
CHLORIDE BLD-SCNC: 103 MMOL/L (ref 94–109)
CLUE CELLS: NORMAL
CO2 SERPL-SCNC: 29 MMOL/L (ref 20–32)
COLOR UR AUTO: YELLOW
CREAT SERPL-MCNC: 0.87 MG/DL (ref 0.52–1.04)
DEPRECATED CALCIDIOL+CALCIFEROL SERPL-MC: 32 UG/L (ref 20–75)
ERYTHROCYTE [DISTWIDTH] IN BLOOD BY AUTOMATED COUNT: 12.4 % (ref 10–15)
GFR SERPL CREATININE-BSD FRML MDRD: >90 ML/MIN/1.73M2
GLUCOSE BLD-MCNC: 90 MG/DL (ref 70–99)
GLUCOSE UR STRIP-MCNC: NEGATIVE MG/DL
HCT VFR BLD AUTO: 41.9 % (ref 35–47)
HGB BLD-MCNC: 14.2 G/DL (ref 11.7–15.7)
HGB UR QL STRIP: NEGATIVE
KETONES UR STRIP-MCNC: NEGATIVE MG/DL
LEUKOCYTE ESTERASE UR QL STRIP: NEGATIVE
MCH RBC QN AUTO: 30.8 PG (ref 26.5–33)
MCHC RBC AUTO-ENTMCNC: 33.9 G/DL (ref 31.5–36.5)
MCV RBC AUTO: 91 FL (ref 78–100)
NITRATE UR QL: NEGATIVE
PH UR STRIP: 5 [PH] (ref 5–7)
PLATELET # BLD AUTO: 203 10E3/UL (ref 150–450)
POTASSIUM BLD-SCNC: 3.6 MMOL/L (ref 3.4–5.3)
PROT SERPL-MCNC: 7.8 G/DL (ref 6.8–8.8)
RBC # BLD AUTO: 4.61 10E6/UL (ref 3.8–5.2)
SODIUM SERPL-SCNC: 136 MMOL/L (ref 133–144)
SP GR UR STRIP: 1.02 (ref 1–1.03)
TRICHOMONAS, WET PREP: NORMAL
TSH SERPL DL<=0.005 MIU/L-ACNC: 1.26 MU/L (ref 0.4–4)
UROBILINOGEN UR STRIP-MCNC: NORMAL MG/DL
WBC # BLD AUTO: 6 10E3/UL (ref 4–11)
WBC'S/HIGH POWER FIELD, WET PREP: NORMAL
YEAST, WET PREP: NORMAL

## 2022-03-16 PROCEDURE — 87210 SMEAR WET MOUNT SALINE/INK: CPT

## 2022-03-16 PROCEDURE — 85027 COMPLETE CBC AUTOMATED: CPT

## 2022-03-16 PROCEDURE — 82306 VITAMIN D 25 HYDROXY: CPT

## 2022-03-16 PROCEDURE — 80053 COMPREHEN METABOLIC PANEL: CPT

## 2022-03-16 PROCEDURE — 84443 ASSAY THYROID STIM HORMONE: CPT

## 2022-03-16 PROCEDURE — 81003 URINALYSIS AUTO W/O SCOPE: CPT

## 2022-03-16 PROCEDURE — 36415 COLL VENOUS BLD VENIPUNCTURE: CPT

## 2022-09-28 ENCOUNTER — VIRTUAL VISIT (OUTPATIENT)
Dept: PSYCHOLOGY | Facility: CLINIC | Age: 32
End: 2022-09-28
Attending: PHYSICIAN ASSISTANT
Payer: OTHER GOVERNMENT

## 2022-09-28 ENCOUNTER — FCC EXTENDED DOCUMENTATION (OUTPATIENT)
Dept: PSYCHOLOGY | Facility: CLINIC | Age: 32
End: 2022-09-28

## 2022-09-28 DIAGNOSIS — F41.1 GENERALIZED ANXIETY DISORDER: ICD-10-CM

## 2022-09-28 DIAGNOSIS — F33.1 MAJOR DEPRESSIVE DISORDER, RECURRENT EPISODE, MODERATE (H): Primary | ICD-10-CM

## 2022-09-28 DIAGNOSIS — F41.9 ANXIETY: ICD-10-CM

## 2022-09-28 PROCEDURE — 90834 PSYTX W PT 45 MINUTES: CPT | Mod: 95

## 2022-09-28 ASSESSMENT — ANXIETY QUESTIONNAIRES
2. NOT BEING ABLE TO STOP OR CONTROL WORRYING: MORE THAN HALF THE DAYS
6. BECOMING EASILY ANNOYED OR IRRITABLE: NEARLY EVERY DAY
GAD7 TOTAL SCORE: 19
1. FEELING NERVOUS, ANXIOUS, OR ON EDGE: NEARLY EVERY DAY
GAD7 TOTAL SCORE: 19
7. FEELING AFRAID AS IF SOMETHING AWFUL MIGHT HAPPEN: NEARLY EVERY DAY
3. WORRYING TOO MUCH ABOUT DIFFERENT THINGS: NEARLY EVERY DAY
5. BEING SO RESTLESS THAT IT IS HARD TO SIT STILL: MORE THAN HALF THE DAYS
IF YOU CHECKED OFF ANY PROBLEMS ON THIS QUESTIONNAIRE, HOW DIFFICULT HAVE THESE PROBLEMS MADE IT FOR YOU TO DO YOUR WORK, TAKE CARE OF THINGS AT HOME, OR GET ALONG WITH OTHER PEOPLE: VERY DIFFICULT

## 2022-09-28 ASSESSMENT — COLUMBIA-SUICIDE SEVERITY RATING SCALE - C-SSRS
2. HAVE YOU ACTUALLY HAD ANY THOUGHTS OF KILLING YOURSELF?: NO
ATTEMPT LIFETIME: NO
TOTAL  NUMBER OF ABORTED OR SELF INTERRUPTED ATTEMPTS LIFETIME: NO
6. HAVE YOU EVER DONE ANYTHING, STARTED TO DO ANYTHING, OR PREPARED TO DO ANYTHING TO END YOUR LIFE?: NO
TOTAL  NUMBER OF INTERRUPTED ATTEMPTS LIFETIME: NO
1. HAVE YOU WISHED YOU WERE DEAD OR WISHED YOU COULD GO TO SLEEP AND NOT WAKE UP?: NO

## 2022-09-28 ASSESSMENT — PATIENT HEALTH QUESTIONNAIRE - PHQ9
SUM OF ALL RESPONSES TO PHQ QUESTIONS 1-9: 16
5. POOR APPETITE OR OVEREATING: NEARLY EVERY DAY
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
## 2022-10-03 ENCOUNTER — VIRTUAL VISIT (OUTPATIENT)
Dept: PSYCHOLOGY | Facility: CLINIC | Age: 32
End: 2022-10-03
Payer: OTHER GOVERNMENT

## 2022-10-03 DIAGNOSIS — F33.1 MAJOR DEPRESSIVE DISORDER, RECURRENT EPISODE, MODERATE (H): Primary | ICD-10-CM

## 2022-10-03 DIAGNOSIS — F41.1 GENERALIZED ANXIETY DISORDER: ICD-10-CM

## 2022-10-03 PROCEDURE — 90791 PSYCH DIAGNOSTIC EVALUATION: CPT | Mod: 95

## 2022-10-03 ASSESSMENT — ANXIETY QUESTIONNAIRES
6. BECOMING EASILY ANNOYED OR IRRITABLE: MORE THAN HALF THE DAYS
7. FEELING AFRAID AS IF SOMETHING AWFUL MIGHT HAPPEN: SEVERAL DAYS
GAD7 TOTAL SCORE: 12
4. TROUBLE RELAXING: MORE THAN HALF THE DAYS
7. FEELING AFRAID AS IF SOMETHING AWFUL MIGHT HAPPEN: SEVERAL DAYS
GAD7 TOTAL SCORE: 12
IF YOU CHECKED OFF ANY PROBLEMS ON THIS QUESTIONNAIRE, HOW DIFFICULT HAVE THESE PROBLEMS MADE IT FOR YOU TO DO YOUR WORK, TAKE CARE OF THINGS AT HOME, OR GET ALONG WITH OTHER PEOPLE: VERY DIFFICULT
3. WORRYING TOO MUCH ABOUT DIFFERENT THINGS: MORE THAN HALF THE DAYS
5. BEING SO RESTLESS THAT IT IS HARD TO SIT STILL: MORE THAN HALF THE DAYS
GAD7 TOTAL SCORE: 12
8. IF YOU CHECKED OFF ANY PROBLEMS, HOW DIFFICULT HAVE THESE MADE IT FOR YOU TO DO YOUR WORK, TAKE CARE OF THINGS AT HOME, OR GET ALONG WITH OTHER PEOPLE?: VERY DIFFICULT
2. NOT BEING ABLE TO STOP OR CONTROL WORRYING: SEVERAL DAYS
1. FEELING NERVOUS, ANXIOUS, OR ON EDGE: MORE THAN HALF THE DAYS
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
## 2022-10-17 ENCOUNTER — VIRTUAL VISIT (OUTPATIENT)
Dept: PSYCHOLOGY | Facility: CLINIC | Age: 32
End: 2022-10-17
Payer: OTHER GOVERNMENT

## 2022-10-17 DIAGNOSIS — F33.1 MAJOR DEPRESSIVE DISORDER, RECURRENT EPISODE, MODERATE (H): Primary | ICD-10-CM

## 2022-10-17 DIAGNOSIS — F41.1 GENERALIZED ANXIETY DISORDER: ICD-10-CM

## 2022-10-17 PROCEDURE — 90834 PSYTX W PT 45 MINUTES: CPT | Mod: 95
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
## 2022-10-24 ENCOUNTER — VIRTUAL VISIT (OUTPATIENT)
Dept: PSYCHOLOGY | Facility: CLINIC | Age: 32
End: 2022-10-24
Payer: OTHER GOVERNMENT

## 2022-10-24 DIAGNOSIS — F41.1 GENERALIZED ANXIETY DISORDER: ICD-10-CM

## 2022-10-24 DIAGNOSIS — F33.1 MAJOR DEPRESSIVE DISORDER, RECURRENT EPISODE, MODERATE (H): Primary | ICD-10-CM

## 2022-10-24 PROCEDURE — 90834 PSYTX W PT 45 MINUTES: CPT | Mod: 95

## 2022-10-24 NOTE — PROGRESS NOTES
[facility]                                     Progress Note    Patient Name: Hansa Ta  Date: 10/24/22         Service Type: Individual      Session Start Time: 8:04am  Session End Time: 8:45am     Session Length: 41 minutes    Session #: 4    Attendees: Client    Service Modality:  Video Visit:      Provider verified identity through the following two step process.  Patient provided:  Patient is known previously to provider    Telemedicine Visit: The patient's condition can be safely assessed and treated via synchronous audio and visual telemedicine encounter.      Reason for Telemedicine Visit: Patient has requested telehealth visit    Originating Site (Patient Location): Patient's home    Distant Site (Provider Location): Provider Remote Setting- Home Office    Consent:  The patient/guardian has verbally consented to: the potential risks and benefits of telemedicine (video visit) versus in person care; bill my insurance or make self-payment for services provided; and responsibility for payment of non-covered services.     Patient would like the video invitation sent by:  My Chart    Mode of Communication:  Video Conference via Amwell    Distant Location (Provider):  Off-site    As the provider I attest to compliance with applicable laws and regulations related to telemedicine.    DATA  Interactive Complexity: No  Crisis: No        Progress Since Last Session (Related to Symptoms / Goals / Homework):   Symptoms: Improving pt reports feeling more calm and having less anxiety this week    Homework: Achieved / completed to satisfaction      Episode of Care Goals: Satisfactory progress - PREPARATION (Decided to change - considering how); Intervened by negotiating a change plan and determining options / strategies for behavior change, identifying triggers, exploring social supports, and working towards setting a date to begin behavior change     Current / Ongoing Stressors and  Concerns:   Pt reports she has been feeling less anxious this week and has been feeling a bit more present. Pt has been taking on a lot of responsibility for cleaning, cooking, etc at her mom's house and has been feeling frustrated about family dynamics.     Treatment Objective(s) Addressed in This Session:   Patient will use cognitive strategies identified in therapy to challenge anxious thoughts.  Patient will identify 2 strategies for reducing anxiety that work for her     Intervention:  Created space for pt to process frustrations around living with mom  Discussed connection between the way she was brought up as a child to how pt functions as an adult  Educated pt on process of change      Assessments completed prior to visit:  The following assessments were completed by patient for this visit:  None      ASSESSMENT: Current Emotional / Mental Status (status of significant symptoms):   Risk status (Self / Other harm or suicidal ideation)   Patient denies current fears or concerns for personal safety.   Patient denies current or recent suicidal ideation or behaviors.   Patient denies current or recent homicidal ideation or behaviors.   Patient denies current or recent self injurious behavior or ideation.   Patient denies other safety concerns.   Patient reports there has been no change in risk factors since their last session.     Patient reports there has been no change in protective factors since their last session.     Recommended that patient call 911 or go to the local ED should there be a change in any of these risk factors.     Appearance:   Appropriate    Eye Contact:   Fair    Psychomotor Behavior: Normal    Attitude:   Cooperative  Guarded    Orientation:   All   Speech    Rate / Production: Normal     Volume:  Normal    Mood:    Anxious    Affect:    Subdued    Thought Content:  Clear    Thought Form:  Coherent  Logical    Insight:    Fair      Medication Review:  Klonopin as needed     Medication  Compliance:   Yes     Changes in Health Issues:   None reported     Chemical Use Review:   Substance Use: Chemical use reviewed, no active concerns identified      Tobacco Use: No current tobacco use.      Diagnosis:  1. Major depressive disorder, recurrent episode, moderate (H)    2. Generalized anxiety disorder        Collateral Reports Completed:   Not Applicable    PLAN: (Patient Tasks / Therapist Tasks / Other)    Pt will notice when childhood messages come up this week. Continue weekly therapy to process recent changes and ongoing stressors to stabilize mood and learn adaptive coping skills.        Hailey Fisher JESUS   October 24, 2022                  This note has been reviewed and I agree with the plan of care. This note is co-signed by LEONORA Shah, [facility], Supervisor, on: 10/28/22                           ______________________________________________________________________    Individual Treatment Plan    Patient's Name: Hansa Ta  YOB: 1990    Date of Creation: 10/17/2022  Date Treatment Plan Last Reviewed/Revised:10/17/2022    DSM5 Diagnoses: 296.32 (F33.1) Major Depressive Disorder, Recurrent Episode, Moderate _ or 300.02 (F41.1) Generalized Anxiety Disorder  Psychosocial / Contextual Factors: going through a divorce, recently started a new relationship, has 2 kids, living with mom currently, about to start an LMFT program  PROMIS (reviewed every 90 days):   PROMIS-10 Scores    PROMIS-10 Total Score w/o Sub Scores 9/28/2022   PROMIS TOTAL - SUBSCORES 19     Referral / Collaboration:  Referral to another professional/service is not indicated at this time..    Anticipated number of session for this episode of care: 9-12 sessions  Anticipation frequency of session: Weekly  Anticipated Duration of each session: 38-52 minutes  Treatment plan will be reviewed in 90 days or when goals have been changed.       MeasurableTreatment Goal(s) related to diagnosis / functional  impairment(s)  Goal 1: Patient will reduce symptoms of anxiety    I will know I've met my goal when I start chilling out on anxiety and am able to focus more in daily life.      Objective #A (Patient Action)    Patient will use cognitive strategies identified in therapy to challenge anxious thoughts.  Status: New - Date: 10/17/2022     Intervention(s)  Therapist will teach pt to identify and challenge cognitive distortions.    Objective #B  Patient will identify 2 strategies for reducing anxiety that work for her.  Status: Continued - Date(s): and New - Date: 10/17/2022     Intervention(s)  Therapist will teach pt breathing and/or meditation techniques that can help center and calm.        Patient has reviewed and agreed to the above plan.      Hailey Fisher, NOREEN  October 17, 2022

## 2022-11-14 ENCOUNTER — VIRTUAL VISIT (OUTPATIENT)
Dept: PSYCHOLOGY | Facility: CLINIC | Age: 32
End: 2022-11-14
Payer: OTHER GOVERNMENT

## 2022-11-14 DIAGNOSIS — F33.1 MAJOR DEPRESSIVE DISORDER, RECURRENT EPISODE, MODERATE (H): Primary | ICD-10-CM

## 2022-11-14 DIAGNOSIS — F41.1 GENERALIZED ANXIETY DISORDER: ICD-10-CM

## 2022-11-14 PROCEDURE — 90832 PSYTX W PT 30 MINUTES: CPT | Mod: 95

## 2022-11-14 ASSESSMENT — PATIENT HEALTH QUESTIONNAIRE - PHQ9
SUM OF ALL RESPONSES TO PHQ QUESTIONS 1-9: 11
10. IF YOU CHECKED OFF ANY PROBLEMS, HOW DIFFICULT HAVE THESE PROBLEMS MADE IT FOR YOU TO DO YOUR WORK, TAKE CARE OF THINGS AT HOME, OR GET ALONG WITH OTHER PEOPLE: SOMEWHAT DIFFICULT
SUM OF ALL RESPONSES TO PHQ QUESTIONS 1-9: 11

## 2022-11-14 NOTE — PROGRESS NOTES
"[facility]                                     Progress Note    Patient Name: Hansa Ta  Date: 11/14/22         Service Type: Individual      Session Start Time: 8:07am  Session End Time: 8:41am     Session Length: 34 minutes    Session #: 5    Attendees: Client    Service Modality:  Video Visit:      Provider verified identity through the following two step process.  Patient provided:  Patient is known previously to provider    Telemedicine Visit: The patient's condition can be safely assessed and treated via synchronous audio and visual telemedicine encounter.      Reason for Telemedicine Visit: Patient has requested telehealth visit    Originating Site (Patient Location): Patient's home    Distant Site (Provider Location): Provider Remote Setting- Home Office    Consent:  The patient/guardian has verbally consented to: the potential risks and benefits of telemedicine (video visit) versus in person care; bill my insurance or make self-payment for services provided; and responsibility for payment of non-covered services.     Patient would like the video invitation sent by:  My Chart    Mode of Communication:  Video Conference via Amwell    Distant Location (Provider):  Off-site    As the provider I attest to compliance with applicable laws and regulations related to telemedicine.    DATA  Interactive Complexity: No  Crisis: No        Progress Since Last Session (Related to Symptoms / Goals / Homework):   Symptoms: Worsening patient has had a \"crazy\" month and has been feeling more anxious    Homework: Achieved / completed to satisfaction      Episode of Care Goals: Minimal progress - CONTEMPLATION (Considering change and yet undecided); Intervened by assessing the negative and positive thinking (ambivalence) about behavior change     Current / Ongoing Stressors and Concerns:   Pt reports she has been having a difficult time living at home and seeing her mom in a bad state with " "drinking/worrying about her children seeing it. Pt reports she got an apartment and will be moving in on the 25th. Pt reports noticing that she is very critical and hard on herself and her girlfriend also saying this about her, but that she \"doesn't know where it comes from\". Pt identifies that her father was a perfectionist and expected the same from her and also wasn't emotionally available. Pt wanted to end early because of a car appointment.     Treatment Objective(s) Addressed in This Session:   Patient will use cognitive strategies identified in therapy to challenge anxious thoughts.  Patient will identify 2 strategies for reducing anxiety that work for her     Intervention:  Created space for pt to process frustrations around living with mom  Discussed connection between the way she was brought up as a child to how pt functions as an adult  Educated pt on process of change      Assessments completed prior to visit:  The following assessments were completed by patient for this visit:  PHQ9:   PHQ-9 SCORE 12/1/2020 12/30/2020 3/15/2021 9/13/2021 9/29/2021 9/28/2022 11/14/2022   PHQ-9 Total Score MyChart 8 (Mild depression) - 10 (Moderate depression) - 5 (Mild depression) - 11 (Moderate depression)   PHQ-9 Total Score 8 12 10 8 5 16 11          ASSESSMENT: Current Emotional / Mental Status (status of significant symptoms):   Risk status (Self / Other harm or suicidal ideation)   Patient denies current fears or concerns for personal safety.   Patient denies current or recent suicidal ideation or behaviors.   Patient denies current or recent homicidal ideation or behaviors.   Patient denies current or recent self injurious behavior or ideation.   Patient denies other safety concerns.   Patient reports there has been no change in risk factors since their last session.     Patient reports there has been no change in protective factors since their last session.     Recommended that patient call 911 or go to the local " ED should there be a change in any of these risk factors.     Appearance:   Appropriate    Eye Contact:   Fair    Psychomotor Behavior: Normal    Attitude:   Cooperative  Guarded    Orientation:   All   Speech    Rate / Production: Normal     Volume:  Normal    Mood:    Anxious    Affect:    Subdued    Thought Content:  Clear    Thought Form:  Coherent  Logical    Insight:    Fair      Medication Review:  Klonopin as needed     Medication Compliance:   Yes     Changes in Health Issues:   None reported     Chemical Use Review:   Substance Use: Chemical use reviewed, no active concerns identified      Tobacco Use: No current tobacco use.      Diagnosis:  1. Major depressive disorder, recurrent episode, moderate (H)    2. Generalized anxiety disorder        Collateral Reports Completed:   Not Applicable    PLAN: (Patient Tasks / Therapist Tasks / Other)    Pt will notice when childhood messages come up this week. Continue weekly therapy to process recent changes and ongoing stressors to stabilize mood and learn adaptive coping skills.        NOREEN York   November 14, 2022                         This note has been reviewed and I agree with the plan of care. This note is co-signed by LEONORA Shah, [facility], Supervisor, on: 11/16/22    ______________________________________________________________________    Individual Treatment Plan    Patient's Name: Hansa Ta  YOB: 1990    Date of Creation: 10/17/2022  Date Treatment Plan Last Reviewed/Revised:10/17/2022    DSM5 Diagnoses: 296.32 (F33.1) Major Depressive Disorder, Recurrent Episode, Moderate _ or 300.02 (F41.1) Generalized Anxiety Disorder  Psychosocial / Contextual Factors: going through a divorce, recently started a new relationship, has 2 kids, living with mom currently, about to start an LMFT program  PROMIS (reviewed every 90 days):   PROMIS-10 Scores    PROMIS-10 Total Score w/o Sub Scores 9/28/2022   PROMIS TOTAL - SUBSCORES 19      Referral / Collaboration:  Referral to another professional/service is not indicated at this time..    Anticipated number of session for this episode of care: 9-12 sessions  Anticipation frequency of session: Weekly  Anticipated Duration of each session: 38-52 minutes  Treatment plan will be reviewed in 90 days or when goals have been changed.       MeasurableTreatment Goal(s) related to diagnosis / functional impairment(s)  Goal 1: Patient will reduce symptoms of anxiety    I will know I've met my goal when I start chilling out on anxiety and am able to focus more in daily life.      Objective #A (Patient Action)    Patient will use cognitive strategies identified in therapy to challenge anxious thoughts.  Status: New - Date: 10/17/2022     Intervention(s)  Therapist will teach pt to identify and challenge cognitive distortions.    Objective #B  Patient will identify 2 strategies for reducing anxiety that work for her.  Status: Continued - Date(s): and New - Date: 10/17/2022     Intervention(s)  Therapist will teach pt breathing and/or meditation techniques that can help center and calm.        Patient has reviewed and agreed to the above plan.      NOREEN York  October 17, 2022

## 2022-12-01 ENCOUNTER — OFFICE VISIT (OUTPATIENT)
Dept: FAMILY MEDICINE | Facility: CLINIC | Age: 32
End: 2022-12-01
Payer: OTHER GOVERNMENT

## 2022-12-01 ENCOUNTER — MYC MEDICAL ADVICE (OUTPATIENT)
Dept: FAMILY MEDICINE | Facility: CLINIC | Age: 32
End: 2022-12-01

## 2022-12-01 VITALS
HEIGHT: 64 IN | BODY MASS INDEX: 35.67 KG/M2 | HEART RATE: 78 BPM | WEIGHT: 208.9 LBS | TEMPERATURE: 98.3 F | DIASTOLIC BLOOD PRESSURE: 70 MMHG | SYSTOLIC BLOOD PRESSURE: 96 MMHG | RESPIRATION RATE: 16 BRPM | OXYGEN SATURATION: 97 %

## 2022-12-01 DIAGNOSIS — B96.89 BACTERIAL VAGINOSIS: ICD-10-CM

## 2022-12-01 DIAGNOSIS — N76.0 BACTERIAL VAGINOSIS: ICD-10-CM

## 2022-12-01 DIAGNOSIS — N89.8 VAGINAL IRRITATION: Primary | ICD-10-CM

## 2022-12-01 DIAGNOSIS — R12 HEARTBURN: ICD-10-CM

## 2022-12-01 DIAGNOSIS — R14.0 BLOATING: ICD-10-CM

## 2022-12-01 DIAGNOSIS — E66.812 CLASS 2 OBESITY WITHOUT SERIOUS COMORBIDITY IN ADULT, UNSPECIFIED BMI, UNSPECIFIED OBESITY TYPE: ICD-10-CM

## 2022-12-01 LAB
ALBUMIN SERPL-MCNC: 4.4 G/DL (ref 3.4–5)
ALP SERPL-CCNC: 84 U/L (ref 40–150)
ALT SERPL W P-5'-P-CCNC: 38 U/L (ref 0–50)
ANION GAP SERPL CALCULATED.3IONS-SCNC: 6 MMOL/L (ref 3–14)
AST SERPL W P-5'-P-CCNC: 28 U/L (ref 0–45)
BILIRUB SERPL-MCNC: 0.5 MG/DL (ref 0.2–1.3)
BUN SERPL-MCNC: 11 MG/DL (ref 7–30)
CALCIUM SERPL-MCNC: 9.1 MG/DL (ref 8.5–10.1)
CHLORIDE BLD-SCNC: 105 MMOL/L (ref 94–109)
CLUE CELLS: PRESENT
CO2 SERPL-SCNC: 25 MMOL/L (ref 20–32)
CREAT SERPL-MCNC: 0.77 MG/DL (ref 0.52–1.04)
ERYTHROCYTE [DISTWIDTH] IN BLOOD BY AUTOMATED COUNT: 12.5 % (ref 10–15)
GFR SERPL CREATININE-BSD FRML MDRD: >90 ML/MIN/1.73M2
GLUCOSE BLD-MCNC: 91 MG/DL (ref 70–99)
HCT VFR BLD AUTO: 43.7 % (ref 35–47)
HGB BLD-MCNC: 13.7 G/DL (ref 11.7–15.7)
MCH RBC QN AUTO: 30 PG (ref 26.5–33)
MCHC RBC AUTO-ENTMCNC: 31.4 G/DL (ref 31.5–36.5)
MCV RBC AUTO: 96 FL (ref 78–100)
PLATELET # BLD AUTO: 188 10E3/UL (ref 150–450)
POTASSIUM BLD-SCNC: 4.1 MMOL/L (ref 3.4–5.3)
PROT SERPL-MCNC: 8.2 G/DL (ref 6.8–8.8)
RBC # BLD AUTO: 4.56 10E6/UL (ref 3.8–5.2)
SODIUM SERPL-SCNC: 136 MMOL/L (ref 133–144)
TRICHOMONAS, WET PREP: ABNORMAL
TSH SERPL DL<=0.005 MIU/L-ACNC: 1.28 MU/L (ref 0.4–4)
WBC # BLD AUTO: 4.8 10E3/UL (ref 4–11)
WBC'S/HIGH POWER FIELD, WET PREP: ABNORMAL
YEAST, WET PREP: ABNORMAL

## 2022-12-01 PROCEDURE — 85027 COMPLETE CBC AUTOMATED: CPT | Performed by: PHYSICIAN ASSISTANT

## 2022-12-01 PROCEDURE — 36415 COLL VENOUS BLD VENIPUNCTURE: CPT | Performed by: PHYSICIAN ASSISTANT

## 2022-12-01 PROCEDURE — 80053 COMPREHEN METABOLIC PANEL: CPT | Performed by: PHYSICIAN ASSISTANT

## 2022-12-01 PROCEDURE — 86364 TISS TRNSGLTMNASE EA IG CLAS: CPT | Performed by: PHYSICIAN ASSISTANT

## 2022-12-01 PROCEDURE — 84443 ASSAY THYROID STIM HORMONE: CPT | Performed by: PHYSICIAN ASSISTANT

## 2022-12-01 PROCEDURE — 82784 ASSAY IGA/IGD/IGG/IGM EACH: CPT | Performed by: PHYSICIAN ASSISTANT

## 2022-12-01 PROCEDURE — 87210 SMEAR WET MOUNT SALINE/INK: CPT | Performed by: PHYSICIAN ASSISTANT

## 2022-12-01 PROCEDURE — 99214 OFFICE O/P EST MOD 30 MIN: CPT | Performed by: PHYSICIAN ASSISTANT

## 2022-12-01 RX ORDER — METRONIDAZOLE 500 MG/1
500 TABLET ORAL 2 TIMES DAILY
Qty: 14 TABLET | Refills: 0 | Status: SHIPPED | OUTPATIENT
Start: 2022-12-01 | End: 2022-12-08

## 2022-12-01 RX ORDER — FAMOTIDINE 40 MG/1
40 TABLET, FILM COATED ORAL DAILY
Qty: 30 TABLET | Refills: 1 | Status: SHIPPED | OUTPATIENT
Start: 2022-12-01 | End: 2023-02-14

## 2022-12-01 ASSESSMENT — PAIN SCALES - GENERAL: PAINLEVEL: NO PAIN (0)

## 2022-12-01 ASSESSMENT — PATIENT HEALTH QUESTIONNAIRE - PHQ9
SUM OF ALL RESPONSES TO PHQ QUESTIONS 1-9: 14
SUM OF ALL RESPONSES TO PHQ QUESTIONS 1-9: 14
10. IF YOU CHECKED OFF ANY PROBLEMS, HOW DIFFICULT HAVE THESE PROBLEMS MADE IT FOR YOU TO DO YOUR WORK, TAKE CARE OF THINGS AT HOME, OR GET ALONG WITH OTHER PEOPLE: SOMEWHAT DIFFICULT

## 2022-12-01 NOTE — PROGRESS NOTES
Assessment & Plan     Vaginal irritation  D/dx discussed  Intermittent sx of BV on and off.   Did treat w/abx recently and had treatment for yeast after.   Last wet prep March 2022 (9 mo ago).   Wet prep today.   With concerns for recurrent or incomplete treatment would like to follow up w/wet prep when symptomatic.   Also discussed contact dermatitis and itching externally. Advised fragrance/dye free detergents and no dryer sheets   - Wet prep - Clinic Collect    Bacterial vaginosis  Treat per below  Repeat wet prep in 4 weeks to verify if resolved post-treatment  Aware not to drink alcohol w/antibiotic   - metroNIDAZOLE (FLAGYL) 500 MG tablet; Take 1 tablet (500 mg) by mouth 2 times daily for 7 days  - Wet prep - lab collect; Future    Class 2 obesity without serious comorbidity in adult, unspecified BMI, unspecified obesity type  Working on diet and exercise with .   Focusing on nutrition.  Trouble losing weight.   Labs below   Discussed ozempic (wegovy) for wt loss. Discussed possible side effects, how to use. She will consider.   - CBC with platelets; Future  - Comprehensive metabolic panel (BMP + Alb, Alk Phos, ALT, AST, Total. Bili, TP); Future  - TSH with free T4 reflex; Future  - CBC with platelets  - Comprehensive metabolic panel (BMP + Alb, Alk Phos, ALT, AST, Total. Bili, TP)  - TSH with free T4 reflex  - Tissue transglutaminase dixon IgA and IgG  - IgA    Bloating  Labs normal.  Treat GERD sx with pepcid.   Monitor for response of sx.   discussed foods that cause bloating  - TSH with free T4 reflex; Future  - Tissue transglutaminase dixon IgA and IgG; Future  - IgA; Future  - famotidine (PEPCID) 40 MG tablet; Take 1 tablet (40 mg) by mouth daily  - TSH with free T4 reflex  - Tissue transglutaminase dixon IgA and IgG  - IgA    Heartburn  See above.   - famotidine (PEPCID) 40 MG tablet; Take 1 tablet (40 mg) by mouth daily       BMI:   Estimated body mass index is 35.86 kg/m  as calculated from the  "following:    Height as of this encounter: 1.626 m (5' 4\").    Weight as of this encounter: 94.8 kg (208 lb 14.4 oz).   Weight management plan: Discussed healthy diet and exercise guidelines    Depression Screening Follow Up    PHQ 12/1/2022   PHQ-9 Total Score 14   Q9: Thoughts of better off dead/self-harm past 2 weeks Not at all       Follow Up: The patient was instructed to contact clinic for worsening symptoms, non-improvement in time frame as expected/discussed, and for questions regarding medications or treatment plan. For virtual visits, the patient was advised to be seen for in person evaluation if symptoms or condition are worsening or non-improvement as expected.       Return in about 4 weeks (around 12/29/2022).    [facility]    Rena Santo is a 32 year old, presenting for the following health issues:  High cortisol levels concern      History of Present Illness       Reason for visit:  Cortisol levels high?  Symptom onset:  More than a month  Symptoms include:  Weight gain irritbility eating problems stomach problems  Symptom intensity:  Severe  Symptom progression:  Worsening  Had these symptoms before:  No  What makes it worse:  No  What makes it better:  No    She eats 2-3 servings of fruits and vegetables daily.She consumes 0 sweetened beverage(s) daily.She exercises with enough effort to increase her heart rate 20 to 29 minutes per day.  She exercises with enough effort to increase her heart rate 3 or less days per week.   She is taking medications regularly.    Today's PHQ-9         PHQ-9 Total Score: 14    PHQ-9 Q9 Thoughts of better off dead/self-harm past 2 weeks :   Not at all    How difficult have these problems made it for you to do your work, take care of things at home, or get along with other people: Somewhat difficult     Vaginal discharge-  Has had recurrent BV sx.   Most recent sx started after an antibiotic 1 mo ago.   She took 2 " "doses of fluconazole and also used metrogel- last week.  Sx come and go.   Sx tend to be itching. Discharge- little bit- thin. Sometimes has odor.   Female partner now. Prior has had male partners (spearated from spouse in process of divorce)- last 1 year ago.   No STI concerns. Had testing when  from spouse.   Using scented laundry detergent and dryer sheets.     Stress & trouble losing weight-    High stress level.  from spouse- in process of divorce. Moved.   Youngest (son) concern autism.   Has 2 kids- ages 8 yo, 3yo.   Trying to lose weight and Can't lose. Thinks doing right things.   Current weight 208 lbs. Feels like her \"normal weight\" is 160-175lb. Last time in that range Nov 2020.   Feels eating well- good fruits, vegetables. Limits carbs. Chicken, steak.    Limited to no alcohol.   Has . Lifting. No cardio.   TSH normal 03/2022  Feels like binge eating or stress eating in times of stress.   Seeing therapist.     GI sx-  Bloating- feels as abd distension. Some heartburn. Frustrated can't eat what all friends eat \"nachoes etc\". Eats poorly and feels worse.   S/p cholecystectomy and appendectomy  Has had surgeries for endometriosis. Still has uterus and 1 ovary.     Review of Systems   Constitutional, HEENT, cardiovascular, pulmonary, gi and gu systems are negative, except as otherwise noted.      Objective    BP 96/70   Pulse 78   Temp 98.3  F (36.8  C) (Temporal)   Resp 16   Ht 1.626 m (5' 4\")   Wt 94.8 kg (208 lb 14.4 oz)   SpO2 97%   BMI 35.86 kg/m    Body mass index is 35.86 kg/m .  Physical Exam   GENERAL: healthy, alert and no distress  EYES: Eyes grossly normal to inspection, PERRL and conjunctivae and sclerae normal  HENT: ear canals and TM's normal, nose and mouth without ulcers or lesions  NECK: no adenopathy, no asymmetry, masses, or scars and thyroid normal to palpation  RESP: lungs clear to auscultation - no rales, rhonchi or wheezes  CV: regular rate " and rhythm, normal S1 S2, no S3 or S4, no murmur, click or rub, no peripheral edema and peripheral pulses strong  ABDOMEN: soft, nontender, no hepatosplenomegaly, no masses and bowel sounds normal   (female): normal female external genitalia, normal urethral meatus, vaginal mucosa pink, moist, well rugated, and normal cervix/adnexa/uterus without masses or discharge  MS: no gross musculoskeletal defects noted, no edema  SKIN: no suspicious lesions or rashes  NEURO: Normal strength and tone, mentation intact and speech normal  PSYCH: mentation appears normal, affect normal/bright  LYMPH: normal ant/post cervical, supraclavicular nodes    Results for orders placed or performed in visit on 12/01/22   CBC with platelets     Status: Abnormal   Result Value Ref Range    WBC Count 4.8 4.0 - 11.0 10e3/uL    RBC Count 4.56 3.80 - 5.20 10e6/uL    Hemoglobin 13.7 11.7 - 15.7 g/dL    Hematocrit 43.7 35.0 - 47.0 %    MCV 96 78 - 100 fL    MCH 30.0 26.5 - 33.0 pg    MCHC 31.4 (L) 31.5 - 36.5 g/dL    RDW 12.5 10.0 - 15.0 %    Platelet Count 188 150 - 450 10e3/uL   Comprehensive metabolic panel (BMP + Alb, Alk Phos, ALT, AST, Total. Bili, TP)     Status: Normal   Result Value Ref Range    Sodium 136 133 - 144 mmol/L    Potassium 4.1 3.4 - 5.3 mmol/L    Chloride 105 94 - 109 mmol/L    Carbon Dioxide (CO2) 25 20 - 32 mmol/L    Anion Gap 6 3 - 14 mmol/L    Urea Nitrogen 11 7 - 30 mg/dL    Creatinine 0.77 0.52 - 1.04 mg/dL    Calcium 9.1 8.5 - 10.1 mg/dL    Glucose 91 70 - 99 mg/dL    Alkaline Phosphatase 84 40 - 150 U/L    AST 28 0 - 45 U/L    ALT 38 0 - 50 U/L    Protein Total 8.2 6.8 - 8.8 g/dL    Albumin 4.4 3.4 - 5.0 g/dL    Bilirubin Total 0.5 0.2 - 1.3 mg/dL    GFR Estimate >90 >60 mL/min/1.73m2   TSH with free T4 reflex     Status: Normal   Result Value Ref Range    TSH 1.28 0.40 - 4.00 mU/L   Tissue transglutaminase dixon IgA and IgG     Status: Normal   Result Value Ref Range    Tissue Transglutaminase Antibody IgA 0.5 <7.0  U/mL    Tissue Transglutaminase Antibody IgG <0.6 <7.0 U/mL   IgA     Status: Normal   Result Value Ref Range    Immunoglobulin A 107 84 - 499 mg/dL   Wet prep - Clinic Collect     Status: Abnormal    Specimen: Vagina; Swab   Result Value Ref Range    Trichomonas Absent Absent    Yeast Absent Absent    Clue Cells Present (A) Absent    WBCs/high power field 1+ (A) None

## 2022-12-01 NOTE — PATIENT INSTRUCTIONS
Metronidazole oral twice daily for  7days    Return for repeat wet prep - self collect lab only in 1 mo    ---    Labs for GI sx    Weight - we talked about ozempic        [Negative] : Genitourinary Skyrizi Counseling: I discussed with the patient the risks of risankizumab-rzaa including but not limited to immunosuppression, and serious infections.  The patient understands that monitoring is required including a PPD at baseline and must alert us or the primary physician if symptoms of infection or other concerning signs are noted.

## 2022-12-02 LAB
IGA SERPL-MCNC: 107 MG/DL (ref 84–499)
TTG IGA SER-ACNC: 0.5 U/ML
TTG IGG SER-ACNC: <0.6 U/ML

## 2022-12-08 ENCOUNTER — TELEPHONE (OUTPATIENT)
Dept: FAMILY MEDICINE | Facility: CLINIC | Age: 32
End: 2022-12-08

## 2022-12-08 DIAGNOSIS — E66.812 CLASS 2 OBESITY WITHOUT SERIOUS COMORBIDITY IN ADULT, UNSPECIFIED BMI, UNSPECIFIED OBESITY TYPE: ICD-10-CM

## 2022-12-12 RX ORDER — LIRAGLUTIDE 6 MG/ML
INJECTION SUBCUTANEOUS DAILY
OUTPATIENT
Start: 2022-12-12

## 2022-12-12 RX ORDER — LIRAGLUTIDE 6 MG/ML
0.6 INJECTION SUBCUTANEOUS DAILY
Qty: 3 ML | Refills: 0 | Status: SHIPPED | OUTPATIENT
Start: 2022-12-12 | End: 2023-01-11

## 2022-12-12 NOTE — TELEPHONE ENCOUNTER
"Pending Prescriptions:                       Disp   Refills    TRULICITY 0.75 MG/0.5ML pen [Pharmacy Med *       0          Routing refill request to provider for review/approval because:  Dose change  Requested Prescriptions   Pending Prescriptions Disp Refills    TRULICITY 0.75 MG/0.5ML pen [Pharmacy Med Name: TRULICITY 0.75 MG/0.5 ML PEN]  0       GLP-1 Agonists Protocol Failed - 12/8/2022  9:47 AM        Failed - HgbA1C in past 3 or 6 months     If HgbA1C is 8 or greater, it needs to be on file within the past 3 months.  If less than 8, must be on file within the past 6 months.     No lab results found.          Failed - Medication is active on med list        Passed - Patient is age 18 or older        Passed - No active pregnancy on record        Passed - Normal serum creatinine on file in past 12 months     Recent Labs   Lab Test 12/01/22  1139   CR 0.77       Ok to refill medication if creatinine is low          Passed - No positive pregnancy test in past 12 months        Passed - Recent (6 mo) or future (30 days) visit within the authorizing provider's specialty     Patient had office visit in the last 6 months or has a visit in the next 30 days with authorizing provider.  See \"Patient Info\" tab in inbasket, or \"Choose Columns\" in Meds & Orders section of the refill encounter.                       " PLEASE CLICK THE EDIT BUTTON, ENTER YOUR RESPONSE TO THE QUERY AND SIGN THE NOTE.    Dear DR. Palomares,    Noted the following:  Per your H & P:    A-fib                                  - rate controlled, continue Atenolol                                 - Resume eliquis per urology rec                * pt was transitioning to pradaxa from eliquis due to cost, 2 days left of eliquiz script and then                                   starting pradaxa 150mg capsule                                   -Continue digoxin      For accurate coding and severity of illness reflection, please further specify                                        Atrial Fibrillation:    Atrial Fibrillation, Paroxysmal  Atrial Fibrillation, Persistent  Atrial Fibrillation, Chronic  Atrial Fibrillation, Unspecified  Other  Unable to determine      Document your clarification at the bottom of this query progress note and in subsequent progress notes.       Thank you,    Allison Whitfield RN   BSN  Clinical      [email or web address]  [facility]      Please respond here:  Atrial Fibrillation, Chronic

## 2022-12-13 DIAGNOSIS — E66.812 CLASS 2 OBESITY WITHOUT SERIOUS COMORBIDITY IN ADULT, UNSPECIFIED BMI, UNSPECIFIED OBESITY TYPE: ICD-10-CM

## 2022-12-13 NOTE — TELEPHONE ENCOUNTER
Patient received voicemail and returning call. Informed that vicotza was sent to pharmacy. However, per previous note pharmacy is stating that per insurance needs to try Trulicity or submit PA.   This has been routed to provider already.     Advised patient not to pick this up from pharmacy just yet until we clarify if Trulicity will be prescribed or if provider will go forward with PA. Patient verbalized understanding and will await call from care team.     Tori GRULLONN, RN  [facility]

## 2022-12-13 NOTE — TELEPHONE ENCOUNTER
Ozempic not covered    trulicity was suggested by pharmacy. Does not have indication for obesity/weight loss.     Pt does not have diabetes.    Victoza does have indication for obesity. Will send to pharmacy. Please let pt know- this is injected daily .     Hansa Morales PA-C

## 2022-12-13 NOTE — TELEPHONE ENCOUNTER
Pharmacy also called to check on status. Informed them that we have been waiting for the patient to return call. They will put Ozempic on hold.     Spoke to pharamcy about Victoza. Per insurance needs to try Trulicity or submit a PA.     RN Triage    Patient Contact    Attempt # 2    Was call answered?  No.  Left message on voicemail with information to call me back.  RN was going to discuss further with patient.       PEBBLES Chowdary, RN, PHN  [place]  December 13, 2022

## 2022-12-13 NOTE — TELEPHONE ENCOUNTER
Patient Contact    Attempt # 1    Was call answered?  No.  Left message on voicemail with information to call the clinic back.     YADI DotsonN, RN, PHN  Registered Nurse-Clinic Triage  [facility]  12/13/2022 at 7:56 AM

## 2022-12-14 NOTE — TELEPHONE ENCOUNTER
See encounter from 12/8/22.    Ivanna Saldivar, BSN, RN, PHN  Registered Nurse-Clinic Triage  [facility]  12/14/2022 at 1:49 PM

## 2022-12-14 NOTE — TELEPHONE ENCOUNTER
ozempic / semaglutide  on back order per pharmacist.     Sent victoza through- needs prior auth for obesity/weight loss indication.  Please submit through PA team.    Phone number for insurance from pharmacist for -[phone]  Hansa Morales PA-C

## 2022-12-15 ENCOUNTER — TELEPHONE (OUTPATIENT)
Dept: FAMILY MEDICINE | Facility: CLINIC | Age: 32
End: 2022-12-15

## 2022-12-15 NOTE — TELEPHONE ENCOUNTER
Prior Authorization Retail Medication Request    Medication/Dose: victoza   ICD code (if different than what is on RX):    Previously Tried and Failed:  ozempic / semaglutide on back order per pharmacist.  Rationale:  needs prior auth for obesity/weight loss indication    Insurance Name:    Insurance ID:        Pharmacy Information (if different than what is on RX)  Name:    Phone:

## 2022-12-15 NOTE — TELEPHONE ENCOUNTER
Central Prior Authorization Team - Phone: [phone]     PA Initiation    Medication: liraglutide (VICTOZA) 18 MG/3ML solution- PA INITIATED  Insurance Company:    Pharmacy Filling the Rx: [id number] [place] - [id number]  Filling Pharmacy Phone: [phone]  Filling Pharmacy Fax:    Start Date: 12/15/2022

## 2022-12-16 ENCOUNTER — MYC MEDICAL ADVICE (OUTPATIENT)
Dept: FAMILY MEDICINE | Facility: CLINIC | Age: 32
End: 2022-12-16

## 2022-12-19 ENCOUNTER — MYC MEDICAL ADVICE (OUTPATIENT)
Dept: FAMILY MEDICINE | Facility: CLINIC | Age: 32
End: 2022-12-19

## 2022-12-19 DIAGNOSIS — E66.812 CLASS 2 OBESITY WITHOUT SERIOUS COMORBIDITY IN ADULT, UNSPECIFIED BMI, UNSPECIFIED OBESITY TYPE: Primary | ICD-10-CM

## 2022-12-19 RX ORDER — DULAGLUTIDE 0.75 MG/.5ML
INJECTION, SOLUTION SUBCUTANEOUS
Refills: 0 | OUTPATIENT
Start: 2022-12-19

## 2022-12-19 NOTE — TELEPHONE ENCOUNTER
Please let pt know the GLP1 medications are not being covered for obesity/weight management indication. Her plan only covers for diagnosis of diabetes.     I could place referral to weight management if she would like? A team of endocrine and nutrition- they prescribe other med options for treatment and provide that team approach of medication and nutrition.     Hansa Morales PA-C

## 2022-12-19 NOTE — TELEPHONE ENCOUNTER
Central Prior Authorization Team - Phone: [phone]     PRIOR AUTHORIZATION DENIED    Medication: liraglutide (VICTOZA) 18 MG/3ML solution- PA DENIED    Denial Date: 12/19/2022    Denial Rational:                 Appeal Information:If the provider would like to appeal, please provide a letter of medical necessity and route back to the team. Otherwise you can close the encounter. Thank you, Central PA Team

## 2023-01-11 ENCOUNTER — OFFICE VISIT (OUTPATIENT)
Dept: FAMILY MEDICINE | Facility: CLINIC | Age: 33
End: 2023-01-11
Payer: OTHER GOVERNMENT

## 2023-01-11 DIAGNOSIS — R74.8 ABNORMAL AST AND ALT: ICD-10-CM

## 2023-01-11 DIAGNOSIS — R79.89 ABNORMAL CBC: ICD-10-CM

## 2023-01-11 DIAGNOSIS — Z11.3 SCREEN FOR STD (SEXUALLY TRANSMITTED DISEASE): ICD-10-CM

## 2023-01-11 DIAGNOSIS — N76.0 BACTERIAL VAGINOSIS: ICD-10-CM

## 2023-01-11 DIAGNOSIS — R74.01 TRANSAMINITIS: ICD-10-CM

## 2023-01-11 DIAGNOSIS — R10.84 ABDOMINAL PAIN, GENERALIZED: Primary | ICD-10-CM

## 2023-01-11 DIAGNOSIS — N80.9 ENDOMETRIOSIS: ICD-10-CM

## 2023-01-11 DIAGNOSIS — B96.89 BACTERIAL VAGINOSIS: ICD-10-CM

## 2023-01-11 LAB
ALBUMIN UR-MCNC: NEGATIVE MG/DL
APPEARANCE UR: CLEAR
BACTERIA #/AREA URNS HPF: NORMAL /HPF
BASOPHILS # BLD AUTO: 0 10E3/UL (ref 0–0.2)
BASOPHILS NFR BLD AUTO: 1 %
BILIRUB UR QL STRIP: NEGATIVE
CLUE CELLS: PRESENT
COLOR UR AUTO: YELLOW
EOSINOPHIL # BLD AUTO: 0 10E3/UL (ref 0–0.7)
EOSINOPHIL NFR BLD AUTO: 0 %
ERYTHROCYTE [DISTWIDTH] IN BLOOD BY AUTOMATED COUNT: 12.5 % (ref 10–15)
GLUCOSE UR STRIP-MCNC: NEGATIVE MG/DL
HCT VFR BLD AUTO: 40 % (ref 35–47)
HGB BLD-MCNC: 13.4 G/DL (ref 11.7–15.7)
HGB UR QL STRIP: NEGATIVE
IMM GRANULOCYTES # BLD: 0 10E3/UL
IMM GRANULOCYTES NFR BLD: 0 %
KETONES UR STRIP-MCNC: NEGATIVE MG/DL
LEUKOCYTE ESTERASE UR QL STRIP: NEGATIVE
LYMPHOCYTES # BLD AUTO: 1.9 10E3/UL (ref 0.8–5.3)
LYMPHOCYTES NFR BLD AUTO: 50 %
MCH RBC QN AUTO: 29.3 PG (ref 26.5–33)
MCHC RBC AUTO-ENTMCNC: 33.5 G/DL (ref 31.5–36.5)
MCV RBC AUTO: 88 FL (ref 78–100)
MONOCYTES # BLD AUTO: 0.4 10E3/UL (ref 0–1.3)
MONOCYTES NFR BLD AUTO: 10 %
NEUTROPHILS # BLD AUTO: 1.5 10E3/UL (ref 1.6–8.3)
NEUTROPHILS NFR BLD AUTO: 40 %
NITRATE UR QL: NEGATIVE
PH UR STRIP: 5.5 [PH] (ref 5–7)
PLATELET # BLD AUTO: 154 10E3/UL (ref 150–450)
RBC # BLD AUTO: 4.57 10E6/UL (ref 3.8–5.2)
RBC #/AREA URNS AUTO: NORMAL /HPF
SP GR UR STRIP: 1.02 (ref 1–1.03)
TRICHOMONAS, WET PREP: ABNORMAL
UROBILINOGEN UR STRIP-ACNC: 0.2 E.U./DL
WBC # BLD AUTO: 3.8 10E3/UL (ref 4–11)
WBC #/AREA URNS AUTO: NORMAL /HPF
WBC'S/HIGH POWER FIELD, WET PREP: ABNORMAL
YEAST, WET PREP: ABNORMAL

## 2023-01-11 PROCEDURE — 80076 HEPATIC FUNCTION PANEL: CPT | Performed by: PHYSICIAN ASSISTANT

## 2023-01-11 PROCEDURE — 99215 OFFICE O/P EST HI 40 MIN: CPT | Performed by: PHYSICIAN ASSISTANT

## 2023-01-11 PROCEDURE — 86803 HEPATITIS C AB TEST: CPT | Performed by: PHYSICIAN ASSISTANT

## 2023-01-11 PROCEDURE — 87340 HEPATITIS B SURFACE AG IA: CPT | Performed by: PHYSICIAN ASSISTANT

## 2023-01-11 PROCEDURE — 83690 ASSAY OF LIPASE: CPT | Performed by: PHYSICIAN ASSISTANT

## 2023-01-11 PROCEDURE — 87591 N.GONORRHOEAE DNA AMP PROB: CPT | Performed by: PHYSICIAN ASSISTANT

## 2023-01-11 PROCEDURE — 85025 COMPLETE CBC W/AUTO DIFF WBC: CPT | Performed by: PHYSICIAN ASSISTANT

## 2023-01-11 PROCEDURE — 86780 TREPONEMA PALLIDUM: CPT | Performed by: PHYSICIAN ASSISTANT

## 2023-01-11 PROCEDURE — 87389 HIV-1 AG W/HIV-1&-2 AB AG IA: CPT | Performed by: PHYSICIAN ASSISTANT

## 2023-01-11 PROCEDURE — 81001 URINALYSIS AUTO W/SCOPE: CPT | Performed by: PHYSICIAN ASSISTANT

## 2023-01-11 PROCEDURE — 86709 HEPATITIS A IGM ANTIBODY: CPT | Performed by: PHYSICIAN ASSISTANT

## 2023-01-11 PROCEDURE — 87491 CHLMYD TRACH DNA AMP PROBE: CPT | Performed by: PHYSICIAN ASSISTANT

## 2023-01-11 PROCEDURE — 86704 HEP B CORE ANTIBODY TOTAL: CPT | Performed by: PHYSICIAN ASSISTANT

## 2023-01-11 PROCEDURE — 36415 COLL VENOUS BLD VENIPUNCTURE: CPT | Performed by: PHYSICIAN ASSISTANT

## 2023-01-11 PROCEDURE — 87210 SMEAR WET MOUNT SALINE/INK: CPT | Performed by: PHYSICIAN ASSISTANT

## 2023-01-11 PROCEDURE — 86706 HEP B SURFACE ANTIBODY: CPT | Performed by: PHYSICIAN ASSISTANT

## 2023-01-11 RX ORDER — OMEPRAZOLE 40 MG/1
40 CAPSULE, DELAYED RELEASE ORAL DAILY
Qty: 30 CAPSULE | Refills: 1 | Status: SHIPPED | OUTPATIENT
Start: 2023-01-11 | End: 2023-03-14

## 2023-01-11 ASSESSMENT — PAIN SCALES - GENERAL: PAINLEVEL: MILD PAIN (3)

## 2023-01-11 ASSESSMENT — PATIENT HEALTH QUESTIONNAIRE - PHQ9: SUM OF ALL RESPONSES TO PHQ QUESTIONS 1-9: 5

## 2023-01-11 NOTE — PROGRESS NOTES
Assessment & Plan     Abdominal pain, generalized  Difficult presentation of abdominal pain.    Patient is describing 2 different pain patterns which she believes to be from 2 separate etiologies, one of which being her endometriosis pattern and other upper abdominal pain, pain with eating, and bloating. No fever with her pain.     Reviewed her ER visit notes, labs, imaging reports.   Labs showing abnormal ALT, AST and alk phos.   CT enlarged right ovary with small cyst otherwise no acute findings.      Of note she had all normal labs including normal liver panel 6 weeks ago at last clinic visit.      She is S/P cholecystecomty, appendectomy, left salpingo-oophrectomy. No bowel obstruction on CT.     Start PPI. H.pylori testing. Upper abdominal US due to transaminitis (gallstones after cholecystectomy?) and for eval of liver. Hepatitis labs. May need EGD. Push fluids. [place] diet. No jaundice. Discussed s/sx for ER - jaundice, new fever, vomiting. Stop all supplements. No tylenol. No alcohol (no recent use).     Endometriosis sx- needs to establish w/OB/GYN with specialty in endometriosis. Pt reports 7 prior surgeries. Her previous OB in [facility] would prescribe narcotics to have on hand (not in recent history). Discussed that is not my practice or comfort level. If pelvic pain worsening prior to OB consult, update pelvic US. Update STI screening.       - Helicobacter pylori Antigen Stool; Future  - UA with Microscopic - lab collect; Future  - omeprazole (PRILOSEC) 40 MG DR capsule; Take 1 capsule (40 mg) by mouth daily  - Helicobacter pylori Antigen Stool  - UA with Microscopic - lab collect  - Urine Microscopic Exam    Transaminitis    Abnormal AST and ALT  See above.   - Hepatic panel (Albumin, ALT, AST, Bili, Alk Phos, TP); Future  - Lipase; Future  - US Abdomen Complete; Future  - Hepatitis A antibody IgM; Future  - Hepatitis B Surface Antibody; Future  - Hepatitis B surface antigen; Future  - Hepatitis B core  antibody; Future  - Hepatic panel (Albumin, ALT, AST, Bili, Alk Phos, TP)  - Lipase  - Hepatitis A antibody IgM  - Hepatitis B Surface Antibody  - Hepatitis B surface antigen  - Hepatitis B core antibody    Endometriosis  See above. Needs to establish with new OB/GYN.     Bacterial vaginosis  Recurrent BV. Prior suppressive therapy with metrogel. Has some on hand at home. Twice weekly use. Discuss with OB.  - Wet prep - lab collect    Abnormal CBC  Leukopenia and thrombocytopenia in ER. Levels trending up form ER visit, still mildly low.   - CBC with platelets and differential; Future  - CBC with platelets and differential    Screen for STD (sexually transmitted disease)  Has not had screening since separation from ex-.   - Chlamydia trachomatis PCR; Future  - Neisseria gonorrhoeae PCR; Future  - HIV Antigen Antibody Combo; Future  - Hepatitis C Screen Reflex to HCV RNA Quant and Genotype; Future  - Treponema Abs w Reflex to RPR and Titer; Future  - Wet prep - lab collect; Future  - Chlamydia trachomatis PCR  - Neisseria gonorrhoeae PCR  - HIV Antigen Antibody Combo  - Hepatitis C Screen Reflex to HCV RNA Quant and Genotype  - Treponema Abs w Reflex to RPR and Titer    Review of prior external note(s) from - [facility] information from [facility] reviewed  55 minutes spent on the date of the encounter doing chart review, review of outside records, interpretation of tests, patient visit and documentation      MED REC REQUIRED  Post Medication Reconciliation Status: discharge medications reconciled, continue medications without change    Follow Up: The patient was instructed to contact clinic for worsening symptoms, non-improvement in time frame as expected/discussed, and for questions regarding medications or treatment plan. For virtual visits, the patient was advised to be seen for in person evaluation if symptoms or condition are worsening or non-improvement as expected.       No follow-ups on  "file.    NGHIA [facility] CARLOS ENRIQUE Santo is a 32 year old accompanied by her self, presenting for the following health issues:  Hospital F/U      History of Present Illness       Reason for visit:  ER Follow up  Symptom onset:  1-2 weeks ago  Symptoms include:  Shoulder pain, low back, flank and lower abdomen pain  Symptom intensity:  Moderate  Symptom progression:  Staying the same  Had these symptoms before:  Yes    She eats 2-3 servings of fruits and vegetables daily.She consumes 1 sweetened beverage(s) daily.She exercises with enough effort to increase her heart rate 30 to 60 minutes per day.  She exercises with enough effort to increase her heart rate 3 or less days per week.   She is taking medications regularly.       ED/UC Followup:    Facility:  [facility]  Date of visit: 1/8/23  Reason for visit: Shoulder, low back, flank and abdominal pain  Current Status: Discharged home, with Endometriosis and ovarian cyst    Also follow up on liver enzymes  ---    Follow up to last visit and ER visit.    1. Last visit BV treatment/recurrent- treated w/oral metronidazole. Did seem to go away with treating. Sx stayed resolved about 1 week but returned. Did also switch to a fragrance free laundry product. Still some itch- comes and goes. Prior suppressive therapy weekly metrogel vaginally.    2. Bloating and heartburn- last visit rx famotidine. Lost bottle of rx and never  started it. No heartburn since last visit. Bloating is not better.  Stomach hurts no matter what I eat. Burping. Last visit: negative celiac labs, normal comp panel, normal cbc. Has not had EGD ever. No unplanned weight losses. Not eating due to discomfort/pain. Minimal hydration. Urine is dark.    3.  ER visit for Abdominal pain over weekend 01/08/2023 [facility] (pt states it is different than abdominal sx in #2) - \"It is my endometriosis pain. Feels like how my endometriosis feels\". Lower " "abdominal pain, lower left sided. Vomited due to pain before ER visit. No fevers. Has has uterus and 1 ovary. Does not have an OB/GYN currently. Not on hormones. Patient's last menstrual period was 12/31/2022 (approximate). Sexually active female partner - no concerns pregnancy. In process of divorce from  - last sexual contact months ago. Unsure last STI testing- reports he had other partners when they were together. Vaginal discharge as above- white and odorous.  Chronic constipation - unchanged, no bleeding/tarry stools. Denies urinary/UTI sx.  In ER: blood work and CT abd/pelvis. Labs in ER- abnormal  (H),  (H) and alk phos 256 (H). Abnormal CBC- WBC 2.1 (L), plt 129 (L) . Normal lipase. Normal SrCr. Normal UA. Neg urine hcg.      S/P cholecystecomty, appendectomy, left salpingo-oophrectomy.      Denies any herbals. Collagen supplement. Vitamin MVI and B12 and vit D. Minimal alcohol, none recent. No substance use.     4.  Weight/obesity- sent rx for GLP1 medications and they were not covered. Losing insurance because divorce is pending. Not started.         Review of Systems   Constitutional, HEENT, cardiovascular, pulmonary, gi and gu systems are negative, except as otherwise noted.      Objective    BP 95/60   Pulse 76   Temp 98.7  F (37.1  C) (Temporal)   Ht 1.645 m (5' 4.76\")   Wt 95.7 kg (210 lb 14.4 oz)   LMP 12/31/2022 (Approximate)   SpO2 97%   BMI 35.35 kg/m    Body mass index is 35.35 kg/m .  Physical Exam   GENERAL: mildly uncomfortable appearing, alert and no distress  Skin: normal, no jaundice, no rashes.   EYES: Eyes grossly normal to inspection, PERRL and conjunctivae and sclerae normal  HENT: ear canals and TM's normal, nose and mouth without ulcers or lesions  NECK: no adenopathy, no asymmetry, masses, or scars and thyroid normal to palpation  RESP: lungs clear to auscultation - no rales, rhonchi or wheezes  CV: regular rate and rhythm, normal S1 S2, no S3 or S4, no " murmur, click or rub, no peripheral edema and peripheral pulses strong  ABDOMEN: neg CVA TTP, +BS, soft, no guarding or rebound.  +tender epigastric and LUQ, +tender suprapubic lower abdomen, no hepatosplenomegaly, no masses   MS: no gross musculoskeletal defects noted, no edema    Results for orders placed or performed in visit on 01/11/23   Hepatic panel (Albumin, ALT, AST, Bili, Alk Phos, TP)     Status: Abnormal   Result Value Ref Range    Bilirubin Total 0.6 0.2 - 1.3 mg/dL    Bilirubin Direct 0.2 0.0 - 0.2 mg/dL    Protein Total 8.1 6.8 - 8.8 g/dL    Albumin 4.8 3.4 - 5.0 g/dL    Alkaline Phosphatase 275 (H) 40 - 150 U/L     (H) 0 - 45 U/L     (H) 0 - 50 U/L   Lipase     Status: Normal   Result Value Ref Range    Lipase 122 73 - 393 U/L   HIV Antigen Antibody Combo     Status: Normal   Result Value Ref Range    HIV Antigen Antibody Combo Nonreactive Nonreactive   Hepatitis C Screen Reflex to HCV RNA Quant and Genotype     Status: Normal   Result Value Ref Range    Hepatitis C Antibody Nonreactive Nonreactive    Narrative    Assay performance characteristics have not been established for newborns, infants, and children.   Treponema Abs w Reflex to RPR and Titer     Status: Normal   Result Value Ref Range    Treponema Antibody Total Nonreactive Nonreactive   UA with Microscopic - lab collect     Status: Normal   Result Value Ref Range    Color Urine Yellow Colorless, Straw, Light Yellow, Yellow    Appearance Urine Clear Clear    Glucose Urine Negative Negative mg/dL    Bilirubin Urine Negative Negative    Ketones Urine Negative Negative mg/dL    Specific Gravity Urine 1.025 1.003 - 1.035    Blood Urine Negative Negative    pH Urine 5.5 5.0 - 7.0    Protein Albumin Urine Negative Negative mg/dL    Urobilinogen Urine 0.2 0.2, 1.0 E.U./dL    Nitrite Urine Negative Negative    Leukocyte Esterase Urine Negative Negative   Hepatitis A antibody IgM     Status: Normal   Result Value Ref Range    Hepatitis  A Antibody IgM Nonreactive Nonreactive   Hepatitis B Surface Antibody     Status: None   Result Value Ref Range    Hepatitis B Surface Antibody Instrument Value 1.93 <8.00 m[IU]/mL    Hepatitis B Surface Antibody Nonreactive    Hepatitis B surface antigen     Status: Normal   Result Value Ref Range    Hepatitis B Surface Antigen Nonreactive Nonreactive   Hepatitis B core antibody     Status: Normal   Result Value Ref Range    Hepatitis B Core Antibody Total Nonreactive Nonreactive   CBC with platelets and differential     Status: Abnormal   Result Value Ref Range    WBC Count 3.8 (L) 4.0 - 11.0 10e3/uL    RBC Count 4.57 3.80 - 5.20 10e6/uL    Hemoglobin 13.4 11.7 - 15.7 g/dL    Hematocrit 40.0 35.0 - 47.0 %    MCV 88 78 - 100 fL    MCH 29.3 26.5 - 33.0 pg    MCHC 33.5 31.5 - 36.5 g/dL    RDW 12.5 10.0 - 15.0 %    Platelet Count 154 150 - 450 10e3/uL    % Neutrophils 40 %    % Lymphocytes 50 %    % Monocytes 10 %    % Eosinophils 0 %    % Basophils 1 %    % Immature Granulocytes 0 %    Absolute Neutrophils 1.5 (L) 1.6 - 8.3 10e3/uL    Absolute Lymphocytes 1.9 0.8 - 5.3 10e3/uL    Absolute Monocytes 0.4 0.0 - 1.3 10e3/uL    Absolute Eosinophils 0.0 0.0 - 0.7 10e3/uL    Absolute Basophils 0.0 0.0 - 0.2 10e3/uL    Absolute Immature Granulocytes 0.0 <=0.4 10e3/uL   Urine Microscopic Exam     Status: Normal   Result Value Ref Range    Bacteria Urine None Seen None Seen /HPF    RBC Urine 0-2 0-2 /HPF /HPF    WBC Urine None Seen 0-5 /HPF /HPF   Wet prep - lab collect     Status: Abnormal    Specimen: Vagina; Swab   Result Value Ref Range    Trichomonas Absent Absent    Yeast Absent Absent    Clue Cells Present (A) Absent    WBCs/high power field None None   CBC with platelets and differential     Status: Abnormal    Narrative    The following orders were created for panel order CBC with platelets and differential.  Procedure                               Abnormality         Status                     ---------                                -----------         ------                     CBC with platelets and d...[[id number]]  Abnormal            Final result                 Please view results for these tests on the individual orders.

## 2023-01-11 NOTE — PATIENT INSTRUCTIONS
Labs today- liver enzymes were abnormal. CBC was mildly abnormal.    Please see an OBGYN- [facility] or [facility] for endometriosis.     Start omeprazole 40mg once daily. Best taken 30min prior to breakfast.   Hydrate well. Fluids with electrolytes not just water.   [place] diet.     STD screening.     To schedule your Imaging Test or Specialty Referral please call: abdominal ultrasound    [facility]   Radiology (imaging- mammogram, ultrasound, CT, MRI): [phone]  Speciality consultation [phone]  [address]  [facility] [address]    [facility]  [address] [place] [address]  [phone]  Specialty consultation [phone]  Colonoscopy [phone]       Other Mammogram Options:  [place] Locations:   [facility], [place], [place]*, [place]* ([facility]), [facility]*-- Call to schedule [phone]    [place] Locations:  [place], [facility], [place]*, [facility], [facility] [place]*, [facility]*, [place], [facility] Cat-- Call to schedule [phone]     [facility] Locations:  [place]* and [facility]-- Call to schedule [phone]    *locations marked with a star have 3D mammography available

## 2023-01-12 LAB
ALBUMIN SERPL-MCNC: 4.8 G/DL (ref 3.4–5)
ALP SERPL-CCNC: 275 U/L (ref 40–150)
ALT SERPL W P-5'-P-CCNC: 239 U/L (ref 0–50)
AST SERPL W P-5'-P-CCNC: 153 U/L (ref 0–45)
BILIRUB DIRECT SERPL-MCNC: 0.2 MG/DL (ref 0–0.2)
BILIRUB SERPL-MCNC: 0.6 MG/DL (ref 0.2–1.3)
HAV IGM SERPL QL IA: NONREACTIVE
HBV CORE AB SERPL QL IA: NONREACTIVE
HBV SURFACE AB SERPL IA-ACNC: 1.93 M[IU]/ML
HBV SURFACE AB SERPL IA-ACNC: NONREACTIVE M[IU]/ML
HBV SURFACE AG SERPL QL IA: NONREACTIVE
HCV AB SERPL QL IA: NONREACTIVE
HIV 1+2 AB+HIV1 P24 AG SERPL QL IA: NONREACTIVE
LIPASE SERPL-CCNC: 122 U/L (ref 73–393)
PROT SERPL-MCNC: 8.1 G/DL (ref 6.8–8.8)
T PALLIDUM AB SER QL: NONREACTIVE

## 2023-01-13 ENCOUNTER — TELEPHONE (OUTPATIENT)
Dept: FAMILY MEDICINE | Facility: CLINIC | Age: 33
End: 2023-01-13

## 2023-01-13 VITALS
OXYGEN SATURATION: 97 % | HEIGHT: 65 IN | BODY MASS INDEX: 35.14 KG/M2 | SYSTOLIC BLOOD PRESSURE: 95 MMHG | TEMPERATURE: 98.7 F | WEIGHT: 210.9 LBS | DIASTOLIC BLOOD PRESSURE: 60 MMHG | HEART RATE: 76 BPM

## 2023-01-13 PROBLEM — R74.8 ABNORMAL AST AND ALT: Status: ACTIVE | Noted: 2023-01-13

## 2023-01-13 LAB
C TRACH DNA SPEC QL NAA+PROBE: NEGATIVE
N GONORRHOEA DNA SPEC QL NAA+PROBE: NEGATIVE

## 2023-01-13 NOTE — TELEPHONE ENCOUNTER
Patient Contact    Attempt # 1    Was call answered?  No.  Left message on voicemail with information to call and ask to speak with any RN at [phone];  I did leave detailed message with provider note  Below as per below and the scheduling number for the endoscopy; [phone]  Kellie JOY RN

## 2023-01-13 NOTE — TELEPHONE ENCOUNTER
Patient returned call;  States provider had left message for her regarding lab results.I gave her the information/instructions below as well as the normal Hep B and Hep A results.   She states understanding of instructions;  Given scheduling number to schedule her abdominal ultrasound at [facility].   She will also schedule the GYN consult.  Per lab note:  Gonzalez Santo,   Your recent test results show:      Your liver functions are moderately elevated. In the same pattern as when you were in the ER , but definitely different than labs from 1 month ago. Please plan to have the abdominal ultrasound as soon as you are able. Should be seen immediately if severe pain, jaundice (yellowing of the skin), vomiting, new fevers along with the pain. If you are taking any herbals or supplements please stop (as we've talked about). If you are taking tylenol (acetaminophen) please check the dose and stop. Avoid all alcohol.      Lipase a pancreas test is normal.     Hepatitis C is negative. Other hepatitis labs are in process still.      HIV is negative.      White count is higher (closer to normal) than when you were in the ER. The platelets are normal.     You mentioned dark urine, which can be from dehydration. Otherwise urine was normal.     Bacterial vaginosis has returned. When you were using metrogel on a regular basis to suppress- were you using twice weekly? If that was helpful can restart but do plan to talk to GYN about other options or recommendations.     Take care,  Hansa Morales PA-C   Written by Hansa Morales PA-C on 1/12/2023  5:35 PM CST

## 2023-01-13 NOTE — TELEPHONE ENCOUNTER
To provider to advise please.  Patient had thought provider was ordering an endoscopy.  This RN does not see an order.  Patient states had used the metrogel once a week.  She still has med so states does not need refill.   Current pharmacy is [facility] in [place].    Did relay the note/instructions from provider as per labs as well as Hep B and Hep A results.  Kellie JOY RN

## 2023-01-16 ENCOUNTER — MYC MEDICAL ADVICE (OUTPATIENT)
Dept: FAMILY MEDICINE | Facility: CLINIC | Age: 33
End: 2023-01-16

## 2023-01-16 DIAGNOSIS — R10.84 ABDOMINAL PAIN, GENERALIZED: Primary | ICD-10-CM

## 2023-01-16 DIAGNOSIS — R74.01 TRANSAMINITIS: ICD-10-CM

## 2023-01-16 DIAGNOSIS — R74.8 ABNORMAL AST AND ALT: ICD-10-CM

## 2023-01-16 PROCEDURE — 87338 HPYLORI STOOL AG IA: CPT | Performed by: PHYSICIAN ASSISTANT

## 2023-01-16 NOTE — TELEPHONE ENCOUNTER
RN called to discuss further.   US is scheduled.   Informed patient team should call her to schedule EGD.  YADI ChowdaryN, RN, PHN  [place]  January 16, 2023

## 2023-01-17 NOTE — TELEPHONE ENCOUNTER
OV 1/11/23.   Abdominal ultrasound tomorrow 1/18/23.     Would you like patient to schedule follow up now, or wait until US results are back?    Tori GRULLONN, RN  [facility]

## 2023-01-18 ENCOUNTER — ANCILLARY PROCEDURE (OUTPATIENT)
Dept: ULTRASOUND IMAGING | Facility: CLINIC | Age: 33
End: 2023-01-18
Attending: PHYSICIAN ASSISTANT
Payer: OTHER GOVERNMENT

## 2023-01-18 DIAGNOSIS — R74.8 ABNORMAL AST AND ALT: ICD-10-CM

## 2023-01-18 LAB — H PYLORI AG STL QL IA: NEGATIVE

## 2023-01-18 PROCEDURE — 76700 US EXAM ABDOM COMPLETE: CPT | Performed by: RADIOLOGY

## 2023-01-19 ENCOUNTER — TELEPHONE (OUTPATIENT)
Dept: GASTROENTEROLOGY | Facility: CLINIC | Age: 33
End: 2023-01-19
Payer: OTHER GOVERNMENT

## 2023-01-19 DIAGNOSIS — R10.84 ABDOMINAL PAIN, GENERALIZED: Primary | ICD-10-CM

## 2023-01-19 DIAGNOSIS — R74.01 TRANSAMINITIS: ICD-10-CM

## 2023-01-19 DIAGNOSIS — R74.8 ABNORMAL AST AND ALT: ICD-10-CM

## 2023-01-19 NOTE — TELEPHONE ENCOUNTER
Screening Questions  BLUE  KIND OF PREP RED  LOCATION [review exclusion criteria] GREEN  SEDATION TYPE        Y Are you active on mychart?       Hansa Morales PA-C Ordering/Referring Provider?         WEST What type of coverage do you have?      N Have you had a positive covid test in the last 14 days?     36.0 1. BMI  [BMI 40+ - review exclusion criteria]    Y  2. Are you able to give consent for your medical care? [IF NO,RN REVIEW]          N  3. Are you taking any prescription pain medications on a routine schedule   (ex narcotics: tramadol, oxycodone, roxicodone, oxycontin,  and percocet)?          3a. EXTENDED PREP What kind of prescription?     N 4. Do you have any chemical dependencies such as alcohol, street drugs, or methadone?        **If yes 3- 5 , please schedule with MAC sedation.**          IF YES TO ANY 6 - 10 - HOSPITAL SETTING ONLY.     N 6.   Do you need assistance transferring?     N 7.   Have you had a heart or lung transplant?    N 8.   Are you currently on dialysis?   N 9.   Do you use daily home oxygen?   N 10. Do you take nitroglycerin?   10a.  If yes, how often?     11. [FEMALES]  N Are you currently pregnant?    11a.  If yes, how many weeks? [ Greater than 12 weeks, OR NEEDED]    N 12. Do you have Pulmonary Hypertension? *NEED PAC APPT AT [facility]*     N 13. [review exclusion criteria]  Do you have any implantable devices in your body (pacemaker, defib, LVAD)?    N 14. In the past 6 months, have you had any heart related issues including cardiomyopathy or heart attack?     14a.  If yes, did it require cardiac stenting if so when?     N 15. Have you had a stroke or Transient ischemic attack (TIA - aka  mini stroke ) within 6 months?      N 16. Do you have mod to severe Obstructive Sleep Apnea?  [Hospital only]    N 17. Do you have SEVERE AND UNCONTROLLED asthma? *NEED PAC APPT AT [facility]*     N 18. Are you currently taking any blood thinners?     18a. If yes, inform patient to  "\"follow up w/ ordering provider for bridging instructions.\"    N 19. Do you take the medication Phentermine?    19a. If yes, \"Hold for 7 days before procedure.  Please consult your prescribing provider if you have questions about holding this medication.\"     N  20. Do you have chronic kidney disease?      N  21. Do you have a diagnosis of diabetes?       22. On a regular basis do you go 3-5 days between bowel movements?      23. Preferred LOCAL Pharmacy for Pre Prescription    [ LIST ONLY ONE PHARMACY]        [facility] - [place], [place] - [address], [address]  [facility] - [place], [place] - [address]  [facility] [address] [place] [facility] - [place], [place] - [address]        - CLOSING REMINDERS -    Informed patient they will need an adult    Cannot take any type of public or medical transportation alone    Conscious Sedation- Needs  for 6 hours after the procedure       MAC/General-Needs  for 24 hours after procedure    Pre-Procedure Covid test to be completed [[facility] PCR Testing Required]    Confirmed Nurse will call to complete assessment       - SCHEDULING DETAILS -  no Hospital Setting Required? If yes, what is the exclusion?: n/a  rob   Surgeon    02/17/23  Date of Procedure  Upper Endoscopy [EGD]  [facility]   Which Colonoscopy Prep was Sent?     MAC - patient request Sedation Type     N PAC / Pre-op Required                 "

## 2023-02-06 ENCOUNTER — TELEPHONE (OUTPATIENT)
Dept: GASTROENTEROLOGY | Facility: CLINIC | Age: 33
End: 2023-02-06
Payer: OTHER GOVERNMENT

## 2023-02-06 NOTE — TELEPHONE ENCOUNTER
Pre assessment questions completed for upcoming Upper endoscopy (EGD) procedure scheduled on 2.17.2023    COVID policy reviewed.     Pre-op scheduled  N/A    Reviewed procedural arrival time 1120, procedure time 1205 and facility location [facility]; [address] Floor, [place] [address]    Designated  policy reviewed. Instructed to have someone stay 24 hours post procedure.     Anticoagulation/blood thinners? No    Electronic implanted devices? No    Diabetic? No    Procedure indication: upper abdominal pain, nausea, post-prandial pain    Reviewed procedure prep instructions.     Prep instructions sent via [facility].      Patient verbalized understanding and had no questions or concerns at this time.    Rocio Hawthorne RN  Endoscopy Procedure Pre Assessment RN

## 2023-02-14 DIAGNOSIS — R12 HEARTBURN: ICD-10-CM

## 2023-02-14 DIAGNOSIS — R14.0 BLOATING: ICD-10-CM

## 2023-02-14 RX ORDER — FAMOTIDINE 40 MG/1
TABLET, FILM COATED ORAL
Qty: 30 TABLET | Refills: 1 | Status: SHIPPED | OUTPATIENT
Start: 2023-02-14 | End: 2023-03-07

## 2023-02-15 ENCOUNTER — ANESTHESIA EVENT (OUTPATIENT)
Dept: SURGERY | Facility: AMBULATORY SURGERY CENTER | Age: 33
End: 2023-02-15
Payer: OTHER GOVERNMENT

## 2023-02-15 NOTE — ANESTHESIA PREPROCEDURE EVALUATION
Anesthesia Pre-Procedure Evaluation    Patient: Hansa Ta   MRN: [id number] : 1990        Procedure : Procedure(s):  ESOPHAGOGASTRODUODENOSCOPY, WITH CO2 INSUFFLATION          Past Medical History:   Diagnosis Date     Endometriosis      Ovarian cyst       Past Surgical History:   Procedure Laterality Date     APPENDECTOMY       C/SECTION, LOW TRANSVERSE       LAPAROSCOPIC CHOLECYSTECTOMY  2019     LAPAROSCOPY DIAGNOSTIC (GYN)      4     OTHER SURGICAL HISTORY      left ovary removed, for cysts      Allergies   Allergen Reactions     Drug Class [Penicillins]       Social History     Tobacco Use     Smoking status: Never     Smokeless tobacco: Never   Substance Use Topics     Alcohol use: Yes     Comment: 2-3 per week       Wt Readings from Last 1 Encounters:   23 95.7 kg (210 lb 14.4 oz)           Physical Exam    Airway        Mallampati: II   TM distance: > 3 FB   Neck ROM: full   Mouth opening: > 3 cm    Respiratory Devices and Support         Dental           Cardiovascular   cardiovascular exam normal          Pulmonary   pulmonary exam normal                OUTSIDE LABS:  CBC:   Lab Results   Component Value Date    WBC 3.8 (L) 2023    WBC 4.8 2022    HGB 13.4 2023    HGB 13.7 2022    HCT 40.0 2023    HCT 43.7 2022     2023     2022     BMP:   Lab Results   Component Value Date     2022     2022    POTASSIUM 4.1 2022    POTASSIUM 3.6 2022    CHLORIDE 105 2022    CHLORIDE 103 2022    CO2 25 2022    CO2 29 2022    BUN 11 2022    BUN 9 2022    CR 0.77 2022    CR 0.87 2022    GLC 91 2022    GLC 90 2022     COAGS: No results found for: PTT, INR, FIBR  POC:   Lab Results   Component Value Date    HCG Negative 2021     HEPATIC:   Lab Results   Component Value Date    ALBUMIN 4.8 2023    PROTTOTAL 8.1 2023     (H)  01/11/2023     (H) 01/11/2023    ALKPHOS 275 (H) 01/11/2023    BILITOTAL 0.6 01/11/2023     OTHER:   Lab Results   Component Value Date    EDNA 9.1 12/01/2022    LIPASE 122 01/11/2023    TSH 1.28 12/01/2022       Anesthesia Plan    ASA Status:  2   NPO Status:  NPO Appropriate    Anesthesia Type: MAC.     - Reason for MAC: straight local not clinically adequate   Induction: Intravenous, Propofol.   Maintenance: TIVA.        Consents    Anesthesia Plan(s) and associated risks, benefits, and realistic alternatives discussed. Questions answered and patient/representative(s) expressed understanding.    - Discussed:     - Discussed with:  Patient      - Extended Intubation/Ventilatory Support Discussed: No.      - Patient is DNR/DNI Status: No    Use of blood products discussed: No .     Postoperative Care       PONV prophylaxis: Ondansetron (or other 5HT-3), Background Propofol Infusion     Comments:                Vaibhav Yin MD

## 2023-02-17 ENCOUNTER — ANESTHESIA (OUTPATIENT)
Dept: SURGERY | Facility: AMBULATORY SURGERY CENTER | Age: 33
End: 2023-02-17
Payer: OTHER GOVERNMENT

## 2023-02-17 ENCOUNTER — HOSPITAL ENCOUNTER (OUTPATIENT)
Facility: AMBULATORY SURGERY CENTER | Age: 33
Discharge: HOME | End: 2023-02-17
Attending: INTERNAL MEDICINE
Payer: OTHER GOVERNMENT

## 2023-02-17 VITALS
DIASTOLIC BLOOD PRESSURE: 69 MMHG | RESPIRATION RATE: 16 BRPM | OXYGEN SATURATION: 100 % | SYSTOLIC BLOOD PRESSURE: 109 MMHG | TEMPERATURE: 97.8 F | HEART RATE: 72 BPM

## 2023-02-17 VITALS — HEART RATE: 80 BPM

## 2023-02-17 LAB — UPPER GI ENDOSCOPY: NORMAL

## 2023-02-17 PROCEDURE — G8907 PT DOC NO EVENTS ON DISCHARG: HCPCS

## 2023-02-17 PROCEDURE — 88342 IMHCHEM/IMCYTCHM 1ST ANTB: CPT | Performed by: STUDENT IN AN ORGANIZED HEALTH CARE EDUCATION/TRAINING PROGRAM

## 2023-02-17 PROCEDURE — 88305 TISSUE EXAM BY PATHOLOGIST: CPT | Performed by: STUDENT IN AN ORGANIZED HEALTH CARE EDUCATION/TRAINING PROGRAM

## 2023-02-17 PROCEDURE — 43239 EGD BIOPSY SINGLE/MULTIPLE: CPT

## 2023-02-17 PROCEDURE — G8918 PT W/O PREOP ORDER IV AB PRO: HCPCS

## 2023-02-17 RX ORDER — ONDANSETRON 2 MG/ML
4 INJECTION INTRAMUSCULAR; INTRAVENOUS EVERY 6 HOURS PRN
Status: DISCONTINUED | OUTPATIENT
Start: 2023-02-17 | End: 2023-02-18 | Stop reason: HOSPADM

## 2023-02-17 RX ORDER — NALOXONE HYDROCHLORIDE 0.4 MG/ML
0.2 INJECTION, SOLUTION INTRAMUSCULAR; INTRAVENOUS; SUBCUTANEOUS
Status: DISCONTINUED | OUTPATIENT
Start: 2023-02-17 | End: 2023-02-18 | Stop reason: HOSPADM

## 2023-02-17 RX ORDER — SODIUM CHLORIDE, SODIUM LACTATE, POTASSIUM CHLORIDE, CALCIUM CHLORIDE 600; 310; 30; 20 MG/100ML; MG/100ML; MG/100ML; MG/100ML
INJECTION, SOLUTION INTRAVENOUS CONTINUOUS
Status: DISCONTINUED | OUTPATIENT
Start: 2023-02-17 | End: 2023-02-18 | Stop reason: HOSPADM

## 2023-02-17 RX ORDER — PROCHLORPERAZINE MALEATE 10 MG
10 TABLET ORAL EVERY 6 HOURS PRN
Status: DISCONTINUED | OUTPATIENT
Start: 2023-02-17 | End: 2023-02-18 | Stop reason: HOSPADM

## 2023-02-17 RX ORDER — NALOXONE HYDROCHLORIDE 0.4 MG/ML
0.4 INJECTION, SOLUTION INTRAMUSCULAR; INTRAVENOUS; SUBCUTANEOUS
Status: DISCONTINUED | OUTPATIENT
Start: 2023-02-17 | End: 2023-02-18 | Stop reason: HOSPADM

## 2023-02-17 RX ORDER — ONDANSETRON 4 MG/1
4 TABLET, ORALLY DISINTEGRATING ORAL EVERY 6 HOURS PRN
Status: DISCONTINUED | OUTPATIENT
Start: 2023-02-17 | End: 2023-02-18 | Stop reason: HOSPADM

## 2023-02-17 RX ORDER — FLUMAZENIL 0.1 MG/ML
0.2 INJECTION, SOLUTION INTRAVENOUS
Status: DISCONTINUED | OUTPATIENT
Start: 2023-02-17 | End: 2023-02-18 | Stop reason: HOSPADM

## 2023-02-17 RX ORDER — LIDOCAINE HYDROCHLORIDE 20 MG/ML
INJECTION, SOLUTION INFILTRATION; PERINEURAL PRN
Status: DISCONTINUED | OUTPATIENT
Start: 2023-02-17 | End: 2023-02-17

## 2023-02-17 RX ORDER — PROPOFOL 10 MG/ML
INJECTION, EMULSION INTRAVENOUS PRN
Status: DISCONTINUED | OUTPATIENT
Start: 2023-02-17 | End: 2023-02-17

## 2023-02-17 RX ADMIN — SODIUM CHLORIDE, SODIUM LACTATE, POTASSIUM CHLORIDE, CALCIUM CHLORIDE: 600; 310; 30; 20 INJECTION, SOLUTION INTRAVENOUS at 13:39

## 2023-02-17 RX ADMIN — PROPOFOL 50 MG: 10 INJECTION, EMULSION INTRAVENOUS at 13:43

## 2023-02-17 RX ADMIN — PROPOFOL 30 MG: 10 INJECTION, EMULSION INTRAVENOUS at 13:44

## 2023-02-17 RX ADMIN — PROPOFOL 30 MG: 10 INJECTION, EMULSION INTRAVENOUS at 13:47

## 2023-02-17 RX ADMIN — LIDOCAINE HYDROCHLORIDE 60 MG: 20 INJECTION, SOLUTION INFILTRATION; PERINEURAL at 13:43

## 2023-02-17 NOTE — H&P
[facility] Anesthesia Pre-op History and Physical    Hansa Ta MRN# [id number]   Age: 32 year old YOB: 1990     Date of Exam 2023         Primary care provider: No Ref-Primary, Physician         Chief Complaint and/or Reason for Procedure:     Epigastric pain         Active problem list:     Patient Active Problem List    Diagnosis Date Noted     Abnormal AST and ALT 2023     Priority: Medium     Chronic gout involving toe without tophus, unspecified cause, unspecified laterality 2021     Priority: Medium     SVEN (generalized anxiety disorder) 2018     Priority: Medium     Previous  delivery affecting pregnancy 10/27/2017     Priority: Medium     Sacroiliac joint pain 2016     Priority: Medium     RUQ abdominal pain 2016     Priority: Medium     Iron deficiency anemia due to chronic blood loss 2015     Priority: Medium     Anxiety 2015     Priority: Medium     Panic attack 2015     Priority: Medium     Post partum depression 2015     Priority: Medium     Wound infection 2015     Priority: Medium     Endometritis 2015     Priority: Medium     Endometriosis 2014     Priority: Medium     Ovarian cyst 2014     Priority: Medium     Problem list name updated by automated process. Provider to review       CARDIOVASCULAR SCREENING; LDL GOAL LESS THAN 160 2014     Priority: Medium     Adjustment disorder with depressed mood 04/15/2008     Priority: Medium            Medications (include herbals and vitamins):   Any Plavix use in the last 7 days? No     Current Outpatient Medications   Medication Sig     famotidine (PEPCID) 40 MG tablet TAKE 1 TABLET BY MOUTH EVERY DAY     hydrOXYzine (ATARAX) 25 MG tablet Take 1-2 tablets (25-50 mg) by mouth 3 times daily as needed for anxiety     ibuprofen (ADVIL/MOTRIN) 600 MG tablet      metroNIDAZOLE (METROGEL) 0.75 % vaginal gel Place 1 applicator (5 g) vaginally  once a week     omeprazole (PRILOSEC) 40 MG DR capsule Take 1 capsule (40 mg) by mouth daily     Current Facility-Administered Medications   Medication     lactated ringers infusion             Allergies:      Allergies   Allergen Reactions     Drug Class [Penicillins]      Allergy to Latex? No  Allergy to tape?   No  Intolerances:             Physical Exam:   All vitals have been reviewed  No data found.  No intake/output data recorded.  Lungs:   No increased work of breathing, good air exchange, clear to auscultation bilaterally, no crackles or wheezing     Cardiovascular:   normal S1 and S2             Lab / Radiology Results:            Anesthetic risk and/or ASA classification:     2  Micaela Welch DO

## 2023-02-17 NOTE — ANESTHESIA POSTPROCEDURE EVALUATION
Patient: Hansa Ta    Procedure: Procedure(s):  ESOPHAGOGASTRODUODENOSCOPY, WITH CO2 INSUFFLATION  ESOPHAGOGASTRODUODENOSCOPY, WITH BIOPSY       Anesthesia Type:  MAC    Note:  Disposition: Outpatient   Postop Pain Control: Uneventful            Sign Out: Well controlled pain   PONV:    Neuro/Psych: Uneventful            Sign Out: Acceptable/Baseline neuro status   Airway/Respiratory: Uneventful            Sign Out: Acceptable/Baseline resp. status   CV/Hemodynamics: Uneventful            Sign Out: Acceptable CV status; No obvious hypovolemia; No obvious fluid overload   Other NRE:    DID A NON-ROUTINE EVENT OCCUR?            Last vitals:  Vitals Value Taken Time   /69 02/17/23 1414   Temp 97.8  F (36.6  C) 02/17/23 1358   Pulse 72 02/17/23 1414   Resp 16 02/17/23 1414   SpO2 100 % 02/17/23 1414       Electronically Signed By: Vaibhav Yin MD  February 17, 2023  3:19 PM

## 2023-02-17 NOTE — ANESTHESIA CARE TRANSFER NOTE
Patient: Hansa Ta    Procedure: Procedure(s):  ESOPHAGOGASTRODUODENOSCOPY, WITH CO2 INSUFFLATION  ESOPHAGOGASTRODUODENOSCOPY, WITH BIOPSY       Diagnosis: Abdominal pain, generalized [R10.84]  Diagnosis Additional Information: No value filed.    Anesthesia Type:   MAC     Note:    Oropharynx: oropharynx clear of all foreign objects  Level of Consciousness: awake  Oxygen Supplementation: room air    Independent Airway: airway patency satisfactory and stable  Dentition: dentition unchanged  Vital Signs Stable: post-procedure vital signs reviewed and stable  Report to RN Given: handoff report given  Patient transferred to: Phase II    Handoff Report: Identifed the Patient, Identified the Reponsible Provider, Reviewed the pertinent medical history, Discussed the surgical course, Reviewed Intra-OP anesthesia mangement and issues during anesthesia, Set expectations for post-procedure period and Allowed opportunity for questions and acknowledgement of understanding      Vitals:  Vitals Value Taken Time   BP     Temp     Pulse     Resp     SpO2         Electronically Signed By: LAYA Lowery CRNA  February 17, 2023  1:53 PM

## 2023-02-20 ENCOUNTER — FCC EXTENDED DOCUMENTATION (OUTPATIENT)
Dept: PSYCHOLOGY | Facility: CLINIC | Age: 33
End: 2023-02-20
Payer: OTHER GOVERNMENT

## 2023-02-20 NOTE — PROGRESS NOTES
Discharge Summary  Single Session    Client Name: Hansa Ta MRN#: [id number] YOB: 1990      Intake / Discharge Date: 9/28/2022  / 11/14/2023      DSM5 Diagnoses: (Sustained by DSM5 Criteria Listed Above)  Diagnoses: 296.32 (F33.1) Major Depressive Disorder, Recurrent Episode, Moderate _  Psychosocial & Contextual Factors: Going through a divorce, recently got into a new relationship, has 2 kids, living with mom for the time being, about to start an LMFT program.          Presenting Concern:  Pt was seeking services for depression and anxiety      Reason for Discharge:  Client did not return      Disposition at Time of Last Encounter:   Anxious   Comments:   Pt stated she would reach out to reschedule, but has not done so.     Risk Management:   Client denies a history of suicidal ideation, suicide attempts, self-injurious behavior, homicidal ideation, homicidal behavior and and other safety concerns  Recommended that patient call 911 or go to the local ED should there be a change in any of these risk factors.      Referred To:  N/A      NOREEN York   2/20/2023

## 2023-02-27 LAB
PATH REPORT.ADDENDUM SPEC: NORMAL
PATH REPORT.COMMENTS IMP SPEC: NORMAL
PATH REPORT.COMMENTS IMP SPEC: NORMAL
PATH REPORT.FINAL DX SPEC: NORMAL
PATH REPORT.GROSS SPEC: NORMAL
PATH REPORT.MICROSCOPIC SPEC OTHER STN: NORMAL
PATH REPORT.RELEVANT HX SPEC: NORMAL
PHOTO IMAGE: NORMAL

## 2023-02-28 DIAGNOSIS — R14.0 BLOATING: ICD-10-CM

## 2023-02-28 DIAGNOSIS — R12 HEARTBURN: ICD-10-CM

## 2023-03-02 NOTE — TELEPHONE ENCOUNTER
"Pending Prescriptions:                       Disp   Refills    famotidine (PEPCID) 40 MG tablet [Pharmacy*90 tab*1        Sig: TAKE 1 TABLET BY MOUTH EVERY DAY    Routing refill request to provider for review/approval because:  Requesting 90 day prescription    Requested Prescriptions   Pending Prescriptions Disp Refills    famotidine (PEPCID) 40 MG tablet [Pharmacy Med Name: FAMOTIDINE 40 MG TABLET] 90 tablet 1     Sig: TAKE 1 TABLET BY MOUTH EVERY DAY       H2 Blockers Protocol Passed - 2/28/2023  1:21 PM        Passed - Patient is age 12 or older        Passed - Recent (12 mo) or future (30 days) visit within the authorizing provider's specialty     Patient has had an office visit with the authorizing provider or a provider within the authorizing providers department within the previous 12 mos or has a future within next 30 days. See \"Patient Info\" tab in inbasket, or \"Choose Columns\" in Meds & Orders section of the refill encounter.              Passed - Medication is active on med list            "

## 2023-03-07 RX ORDER — FAMOTIDINE 40 MG/1
TABLET, FILM COATED ORAL
Qty: 90 TABLET | Refills: 1 | Status: SHIPPED | OUTPATIENT
Start: 2023-03-07 | End: 2023-03-22

## 2023-03-12 DIAGNOSIS — R10.84 ABDOMINAL PAIN, GENERALIZED: ICD-10-CM

## 2023-03-14 RX ORDER — OMEPRAZOLE 40 MG/1
CAPSULE, DELAYED RELEASE ORAL
Qty: 30 CAPSULE | Refills: 1 | Status: SHIPPED | OUTPATIENT
Start: 2023-03-14 | End: 2023-03-22

## 2023-03-14 NOTE — TELEPHONE ENCOUNTER
[facility]    Appointments in Next Year    Mar 22, 2023  9:30 AM  (Arrive by 9:10 AM)  Provider Visit with Hansa Morales PA-C  [facility] ([facility] ) [phone]

## 2023-03-22 ENCOUNTER — OFFICE VISIT (OUTPATIENT)
Dept: FAMILY MEDICINE | Facility: CLINIC | Age: 33
End: 2023-03-22
Payer: OTHER GOVERNMENT

## 2023-03-22 VITALS
RESPIRATION RATE: 17 BRPM | DIASTOLIC BLOOD PRESSURE: 68 MMHG | HEIGHT: 65 IN | WEIGHT: 215 LBS | TEMPERATURE: 97.8 F | OXYGEN SATURATION: 98 % | HEART RATE: 68 BPM | SYSTOLIC BLOOD PRESSURE: 122 MMHG | BODY MASS INDEX: 35.82 KG/M2

## 2023-03-22 DIAGNOSIS — R10.9 ABDOMINAL PAIN, UNSPECIFIED ABDOMINAL LOCATION: ICD-10-CM

## 2023-03-22 DIAGNOSIS — R74.8 ABNORMAL AST AND ALT: Primary | ICD-10-CM

## 2023-03-22 DIAGNOSIS — F41.1 GAD (GENERALIZED ANXIETY DISORDER): ICD-10-CM

## 2023-03-22 DIAGNOSIS — N80.9 ENDOMETRIOSIS: ICD-10-CM

## 2023-03-22 LAB
ALBUMIN SERPL-MCNC: 4.3 G/DL (ref 3.4–5)
ALP SERPL-CCNC: 83 U/L (ref 40–150)
ALT SERPL W P-5'-P-CCNC: 27 U/L (ref 0–50)
AST SERPL W P-5'-P-CCNC: 15 U/L (ref 0–45)
BILIRUB DIRECT SERPL-MCNC: <0.1 MG/DL (ref 0–0.2)
BILIRUB SERPL-MCNC: 0.5 MG/DL (ref 0.2–1.3)
PROT SERPL-MCNC: 7.7 G/DL (ref 6.8–8.8)

## 2023-03-22 PROCEDURE — 99214 OFFICE O/P EST MOD 30 MIN: CPT | Performed by: PHYSICIAN ASSISTANT

## 2023-03-22 PROCEDURE — 36415 COLL VENOUS BLD VENIPUNCTURE: CPT | Performed by: PHYSICIAN ASSISTANT

## 2023-03-22 PROCEDURE — 80076 HEPATIC FUNCTION PANEL: CPT | Performed by: PHYSICIAN ASSISTANT

## 2023-03-22 RX ORDER — DULOXETIN HYDROCHLORIDE 30 MG/1
30 CAPSULE, DELAYED RELEASE ORAL DAILY
Qty: 53 CAPSULE | Refills: 0 | Status: SHIPPED | OUTPATIENT
Start: 2023-03-22 | End: 2023-04-17

## 2023-03-22 ASSESSMENT — PAIN SCALES - GENERAL: PAINLEVEL: NO PAIN (0)

## 2023-03-22 NOTE — PATIENT INSTRUCTIONS
You have been prescribed a medication to help with anxiety    Take this medication once daily.     If instructed by your health care provider you may be advised to take a half tablet (half dose) daily for the first few days to reduce side effects and ease into a new medication.     Common side effects are nausea, headache, stomach upset or mild diarrhea. If you find it makes you sleepy, plan to take it at bedtime. Most of these side effects gradually improve over the first week of use.     It can take up to 2 weeks to notice initial benefits from the medication (ie increase in energy), and up to 4-6 weeks for other symptom improvement.     If you experience a worsening in mood or thoughts of self harm, seek help immediately.   Any Emergency Department if in crisis. After hours crisis line at [phone] or [phone]. [place] Crisis Text Line: Text [place] to 579805  or  Suicide LifeLine Chat: suicidepreventionMakeSpaceline.org/chat/    Non pharmacological treatment for depression and/or anxiety:  Counseling, Therapy or Cognitive Behavioral Therapy: In combination with medications, talking with a professional therapist can help reduce and improve symptoms of depression or anxiety. I can place a referral for you to arrange counseling through [facility]. You can also call your insurance to find covered therapists in your area. If the first person you see is not a good fit it is ok to try a different counselor.    Regular exercise reduces anxiety and depression symptoms. Being active 30-40 minutes per day 3-5 times per week can improve symptoms.    Reduce or avoid caffeine and alcohol. Caffeine can disrupt sleep and increase anxiety symptoms.  Alcohol has a depressive effect on the brain and works against the action of many medications.    Plan to follow up in 3-4 weeks.

## 2023-03-22 NOTE — PROGRESS NOTES
Assessment & Plan     Abnormal AST and ALT  Liver enzymes are now back to normal.  Transient elevation w/abdominal pain- viral?   Normal hepatitis labs.  She will continue to abstain from alcohol.   Continue monitoring every 6 months. S/sx for sooner recheck discussed.   - Hepatic panel (Albumin, ALT, AST, Bili, Alk Phos, TP); Future  - Hepatic panel (Albumin, ALT, AST, Bili, Alk Phos, TP)    Abdominal pain, unspecified abdominal location  Having improvement   She would like to continue with the natural clinic supplements.   Reviewed endoscopy results.   Discussed constipation management.     SVEN (generalized anxiety disorder)  High anxiety- situational factors contributing.   Reviewed prior meds she has taken in the past.   Discussed duloxetine as may also benefit the endometriosis pain.   Start per sig. Self cares. Recheck in 3-4 weeks  - DULoxetine (CYMBALTA) 30 MG capsule; Take 1 capsule (30 mg) by mouth daily X 7days. Then increase to 60mg (2 caps) daily.    Endometriosis  Discussed may have benefit for chronic pelvic/endometriosis sx.  Did encourage to establish with OB/GYN  - DULoxetine (CYMBALTA) 30 MG capsule; Take 1 capsule (30 mg) by mouth daily X 7days. Then increase to 60mg (2 caps) daily.       Follow Up: The patient was instructed to contact clinic for worsening symptoms, non-improvement in time frame as expected/discussed, and for questions regarding medications or treatment plan. For virtual visits, the patient was advised to be seen for in person evaluation if symptoms or condition are worsening or non-improvement as expected.       NGHIA [facility]    Rena Santo is a 32 year old, presenting for the following health issues:  Results (Endoscopy)    History of Present Illness       Reason for visit:  Follow up    She eats 2-3 servings of fruits and vegetables daily.She consumes 0 sweetened beverage(s) daily.She exercises with enough effort to increase  "her heart rate 30 to 60 minutes per day.  She exercises with enough effort to increase her heart rate 4 days per week.   She is taking medications regularly.     Endoscopy results    1. Last visit 2 months ago for abd pain:  Here to follow up on that and elevated liver enzymes    \"I think things are going better.\"   \"I went looking for my own remedies.\" Going to a a nutrition/weight management in [facility] - called \"[facility]\". Eating response testing- \"muscle testing\"- they push my arm up- \"I have a bloating spot\".   They have advised supplements and she is taking 4 total for: parasite cleanse, something for liver, and scar stat.  Started these few weeks ago.   They are doing red light therapy as well. I sit in red room.     I know this is not traditional medicine but I feel better.   I was waking up with pain on my right side. It is pretty much gone away.   BM more regularly. 4 BM per day- \"BM are perfect\"- soft easy to pass.   No alcohol at all.     Has not seen OB/GYN for her concerns of endocmetriosis - \"has not had any issues with that\".    Has not seen hepatology and is not scheduled.     2. Anxiety, panic sx, waking at night from nightmares.  I feel on edge all the time  Constantly checking on kids.  Not sleeping  Trigger- divorce is almost complete which is good but some difficulty w/soon to be ex   Doing yoga, gym.  No alcohol  Trying to do the healthy things.   Deep breathing.   Hx of medicines for anxiety: lexapro (2018), prozac (2020), sertraline (2015), trazodone (2020). Wellbutrin as teen.   Stopped seeing therapist  Fall 2022.           Review of Systems   Constitutional, HEENT, cardiovascular, pulmonary, gi and gu systems are negative, except as otherwise noted.      Objective    /68 (BP Location: Left arm, Patient Position: Chair, Cuff Size: Adult Large)   Pulse 68   Temp 97.8  F (36.6  C) (Temporal)   Resp 17   Ht 1.645 m (5' 4.75\")   Wt 97.5 kg (215 lb)   LMP  (LMP Unknown)   " SpO2 98%   Breastfeeding No   BMI 36.05 kg/m    Body mass index is 36.05 kg/m .  Physical Exam   GENERAL: healthy, alert and no distress  EYES: Eyes grossly normal to inspection, PERRL and conjunctivae and sclerae normal  HENT: ear canals and TM's normal, nose and mouth without ulcers or lesions  NECK: no adenopathy, no asymmetry, masses, or scars and thyroid normal to palpation  RESP: lungs clear to auscultation - no rales, rhonchi or wheezes  CV: regular rate and rhythm, normal S1 S2, no S3 or S4, no murmur, click or rub, no peripheral edema and peripheral pulses strong  ABDOMEN: soft, nontender, no hepatosplenomegaly, no masses and bowel sounds normal  MS: no gross musculoskeletal defects noted, no edema  NEURO: Normal strength and tone, mentation intact and speech normal  PSYCH: mentation appears normal, affect normal/bright  LYMPH: normal ant/post cervical, supraclavicular nodes    Results for orders placed or performed in visit on 03/22/23   Hepatic panel (Albumin, ALT, AST, Bili, Alk Phos, TP)     Status: Normal   Result Value Ref Range    Bilirubin Total 0.5 0.2 - 1.3 mg/dL    Bilirubin Direct <0.1 0.0 - 0.2 mg/dL    Protein Total 7.7 6.8 - 8.8 g/dL    Albumin 4.3 3.4 - 5.0 g/dL    Alkaline Phosphatase 83 40 - 150 U/L    AST 15 0 - 45 U/L    ALT 27 0 - 50 U/L

## 2023-04-16 ENCOUNTER — E-VISIT (OUTPATIENT)
Dept: FAMILY MEDICINE | Facility: CLINIC | Age: 33
End: 2023-04-16
Payer: OTHER GOVERNMENT

## 2023-04-16 DIAGNOSIS — F41.1 GAD (GENERALIZED ANXIETY DISORDER): Primary | ICD-10-CM

## 2023-04-16 PROCEDURE — 99421 OL DIG E/M SVC 5-10 MIN: CPT | Performed by: PHYSICIAN ASSISTANT

## 2023-04-16 ASSESSMENT — ANXIETY QUESTIONNAIRES
5. BEING SO RESTLESS THAT IT IS HARD TO SIT STILL: SEVERAL DAYS
6. BECOMING EASILY ANNOYED OR IRRITABLE: SEVERAL DAYS
GAD7 TOTAL SCORE: 8
7. FEELING AFRAID AS IF SOMETHING AWFUL MIGHT HAPPEN: SEVERAL DAYS
2. NOT BEING ABLE TO STOP OR CONTROL WORRYING: SEVERAL DAYS
3. WORRYING TOO MUCH ABOUT DIFFERENT THINGS: SEVERAL DAYS
4. TROUBLE RELAXING: MORE THAN HALF THE DAYS
GAD7 TOTAL SCORE: 8
7. FEELING AFRAID AS IF SOMETHING AWFUL MIGHT HAPPEN: SEVERAL DAYS
1. FEELING NERVOUS, ANXIOUS, OR ON EDGE: SEVERAL DAYS
8. IF YOU CHECKED OFF ANY PROBLEMS, HOW DIFFICULT HAVE THESE MADE IT FOR YOU TO DO YOUR WORK, TAKE CARE OF THINGS AT HOME, OR GET ALONG WITH OTHER PEOPLE?: SOMEWHAT DIFFICULT
GAD7 TOTAL SCORE: 8

## 2023-04-17 RX ORDER — DULOXETIN HYDROCHLORIDE 60 MG/1
60 CAPSULE, DELAYED RELEASE ORAL DAILY
Qty: 90 CAPSULE | Refills: 0 | Status: SHIPPED | OUTPATIENT
Start: 2023-04-17 | End: 2023-07-24

## 2023-04-17 ASSESSMENT — ANXIETY QUESTIONNAIRES: GAD7 TOTAL SCORE: 8

## 2023-04-17 NOTE — TELEPHONE ENCOUNTER
11/14/2022     8:05 AM 12/1/2022    10:05 AM 1/11/2023     2:13 PM   PHQ   PHQ-9 Total Score 11 14 5   Q9: Thoughts of better off dead/self-harm past 2 weeks Not at all Not at all Not at all         9/28/2022    10:10 AM 10/3/2022     8:01 AM 4/16/2023     1:08 PM   SVEN-7 SCORE   Total Score  12 (moderate anxiety) 8 (mild anxiety)   Total Score 19 12    12 8         Provider E-Visit time total (minutes): 6 min  Hansa Morales PA-C

## 2023-04-30 ENCOUNTER — HEALTH MAINTENANCE LETTER (OUTPATIENT)
Age: 33
End: 2023-04-30

## 2023-07-22 DIAGNOSIS — F41.1 GAD (GENERALIZED ANXIETY DISORDER): ICD-10-CM

## 2023-07-24 RX ORDER — DULOXETIN HYDROCHLORIDE 60 MG/1
CAPSULE, DELAYED RELEASE ORAL
Qty: 90 CAPSULE | Refills: 0 | Status: SHIPPED | OUTPATIENT
Start: 2023-07-24 | End: 2023-08-29

## 2023-08-07 ENCOUNTER — MYC MEDICAL ADVICE (OUTPATIENT)
Dept: FAMILY MEDICINE | Facility: CLINIC | Age: 33
End: 2023-08-07
Payer: OTHER GOVERNMENT

## 2023-08-07 NOTE — TELEPHONE ENCOUNTER
Patient Quality Outreach    Patient is due for the following:   Depression  -  PHQ-9 needed  Physical Preventive Adult Physical      Topic Date Due    Hepatitis B Vaccine (1 of 3 - 3-dose series) Never done    COVID-19 Vaccine (1) Never done       Next Steps:   Schedule a Adult Preventative    Type of outreach:    Sent [employer] message.      Questions for provider review:    None           Jazz Charles, [facility]  Chart routed to Care Team.

## 2023-08-07 NOTE — LETTER
[facility]  [address], [address]  [place] [address]  Phone: [phone]  Fax: [phone]  August 14, 2023      Hansa Ta  [address]  [place] [address]      Dear Hansa,    We care about your health and have reviewed your health plan including your medical conditions, medications, and lab results.  Based on this review, it is recommended that you follow up regarding the following health topic(s):  -Depression  -Wellness (Physical) Visit     We recommend you take the following action(s):  -schedule a WELLNESS (Physical) APPOINTMENT.  We will perform the following labs: none at this time.  -Complete and return the attached PHQ-9 Form.  If your total score is greater than 9, please schedule a followup appointment.  If you answer Yes to question 9, call your clinic between the hours of 8 to 5.  You may also call the Suicide Hotline at 4-075-075-IPLD (0935) any time.     Please call us at the [facility] [phone] (or use [employer]) to address the above recommendations.     Thank you for trusting [facility] and we appreciate the opportunity to serve you.  We look forward to supporting your healthcare needs in the future.    Healthy Regards,    Your Health Care Team  [facility]

## 2023-08-14 NOTE — TELEPHONE ENCOUNTER
Patient Quality Outreach    Patient is due for the following:   Depression  -  PHQ-9 needed  Physical Preventive Adult Physical      Topic Date Due    Hepatitis B Vaccine (1 of 3 - 3-dose series) Never done    COVID-19 Vaccine (1) Never done       Next Steps:   Schedule a Adult Preventative    Type of outreach:    Sent letter.    Next Steps:  Reach out within 90 days via [employer].    Max number of attempts reached: Yes. Will try again in 90 days if patient still on fail list.    Questions for provider review:    None           Jazz Charles, CMA

## 2023-08-23 ENCOUNTER — E-VISIT (OUTPATIENT)
Dept: FAMILY MEDICINE | Facility: CLINIC | Age: 33
End: 2023-08-23
Payer: OTHER GOVERNMENT

## 2023-08-23 ENCOUNTER — MYC MEDICAL ADVICE (OUTPATIENT)
Dept: FAMILY MEDICINE | Facility: CLINIC | Age: 33
End: 2023-08-23
Payer: OTHER GOVERNMENT

## 2023-08-23 DIAGNOSIS — N76.0 BACTERIAL VAGINOSIS: ICD-10-CM

## 2023-08-23 DIAGNOSIS — R30.0 DYSURIA: ICD-10-CM

## 2023-08-23 DIAGNOSIS — B96.89 BACTERIAL VAGINOSIS: ICD-10-CM

## 2023-08-23 DIAGNOSIS — N89.8 VAGINAL DISCHARGE: Primary | ICD-10-CM

## 2023-08-23 PROCEDURE — 99421 OL DIG E/M SVC 5-10 MIN: CPT | Performed by: PHYSICIAN ASSISTANT

## 2023-08-23 NOTE — PATIENT INSTRUCTIONS
Thank you for choosing us for your care. Given your symptoms, I would like you to do a lab-only visit to determine what is causing them.  I have placed the orders.  Please schedule an appointment with the lab right here [place], or call [phone].  I will let you know when the results are back and next steps to take.

## 2023-08-24 ENCOUNTER — LAB (OUTPATIENT)
Dept: LAB | Facility: CLINIC | Age: 33
End: 2023-08-24
Payer: OTHER GOVERNMENT

## 2023-08-24 DIAGNOSIS — N89.8 VAGINAL DISCHARGE: ICD-10-CM

## 2023-08-24 DIAGNOSIS — R30.0 DYSURIA: ICD-10-CM

## 2023-08-24 LAB
ALBUMIN UR-MCNC: NEGATIVE MG/DL
APPEARANCE UR: CLEAR
BILIRUB UR QL STRIP: NEGATIVE
CLUE CELLS: PRESENT
COLOR UR AUTO: YELLOW
GLUCOSE UR STRIP-MCNC: NEGATIVE MG/DL
HGB UR QL STRIP: NEGATIVE
KETONES UR STRIP-MCNC: NEGATIVE MG/DL
LEUKOCYTE ESTERASE UR QL STRIP: NEGATIVE
NITRATE UR QL: NEGATIVE
PH UR STRIP: 7 [PH] (ref 5–7)
SP GR UR STRIP: 1.01 (ref 1–1.03)
TRICHOMONAS, WET PREP: ABNORMAL
UROBILINOGEN UR STRIP-ACNC: 0.2 E.U./DL
WBC'S/HIGH POWER FIELD, WET PREP: ABNORMAL
YEAST, WET PREP: ABNORMAL

## 2023-08-24 PROCEDURE — 87210 SMEAR WET MOUNT SALINE/INK: CPT

## 2023-08-24 PROCEDURE — 81003 URINALYSIS AUTO W/O SCOPE: CPT

## 2023-08-24 RX ORDER — METRONIDAZOLE 500 MG/1
500 TABLET ORAL 2 TIMES DAILY
Qty: 14 TABLET | Refills: 0 | Status: SHIPPED | OUTPATIENT
Start: 2023-08-24 | End: 2023-08-29 | Stop reason: SINTOL

## 2023-08-28 ENCOUNTER — MYC MEDICAL ADVICE (OUTPATIENT)
Dept: FAMILY MEDICINE | Facility: CLINIC | Age: 33
End: 2023-08-28
Payer: OTHER GOVERNMENT

## 2023-08-28 DIAGNOSIS — B96.89 BACTERIAL VAGINOSIS: Primary | ICD-10-CM

## 2023-08-28 DIAGNOSIS — N76.0 BACTERIAL VAGINOSIS: Primary | ICD-10-CM

## 2023-08-29 RX ORDER — CLINDAMYCIN PHOSPHATE 20 MG/G
1 CREAM VAGINAL AT BEDTIME
Qty: 40 G | Refills: 0 | Status: SHIPPED | OUTPATIENT
Start: 2023-08-29 | End: 2023-09-05

## 2023-10-08 ENCOUNTER — MYC MEDICAL ADVICE (OUTPATIENT)
Dept: FAMILY MEDICINE | Facility: CLINIC | Age: 33
End: 2023-10-08
Payer: OTHER GOVERNMENT

## 2023-10-08 NOTE — LETTER
[facility]  [address], [address]  [place] [address]  Phone: [phone]  Fax: [phone]  October 17, 2023      Hansa Ta  [address]  [place] [address]      Dear Hansa,    We care about your health and have reviewed your health plan including your medical conditions, medications, and lab results.  Based on this review, it is recommended that you follow up regarding the following health topic(s):  -Depression  -Wellness (Physical) Visit     We recommend you take the following action(s):  - Schedule a WELLNESS (Physical) APPOINTMENT   -Complete and return the attached PHQ-9 Form.  If your total score is greater than 9, please schedule a followup appointment.  If you answer Yes to question 9, call your clinic between the hours of 8 to 5.  You may also call the Suicide Hotline at 9-010-820-RJGC (9778) any time.     Please call us at the [facility] [phone] (or use [employer]) to address the above recommendations.     Thank you for trusting [facility] and we appreciate the opportunity to serve you.  We look forward to supporting your healthcare needs in the future.    Healthy Regards,    Your Health Care Team  [facility]

## 2023-10-08 NOTE — TELEPHONE ENCOUNTER
Patient Quality Outreach    Patient is due for the following:   Depression  -  PHQ-9 needed  Physical Preventive Adult Physical      Topic Date Due    Hepatitis B Vaccine (1 of 3 - 3-dose series) Never done    COVID-19 Vaccine (1) Never done       Next Steps:   Schedule a Adult Preventative  Patient was assigned appropriate questionnaire to complete    Type of outreach:    Sent [employer] message.  Send letter if not completed/scheduled in 1 week    Questions for provider review:    None           Jazz Charles, [facility]  Chart routed to Care Team.

## 2024-07-07 ENCOUNTER — HEALTH MAINTENANCE LETTER (OUTPATIENT)
Age: 34
End: 2024-07-07

## 2025-02-27 ENCOUNTER — LAB REQUISITION (OUTPATIENT)
Dept: LAB | Facility: CLINIC | Age: 35
End: 2025-02-27
Payer: COMMERCIAL

## 2025-02-27 DIAGNOSIS — R63.5 ABNORMAL WEIGHT GAIN: ICD-10-CM

## 2025-02-27 DIAGNOSIS — F31.9 BIPOLAR DISORDER, UNSPECIFIED (H): ICD-10-CM

## 2025-02-27 DIAGNOSIS — E66.811 OBESITY, CLASS 1: ICD-10-CM

## 2025-02-27 LAB
ANION GAP SERPL CALCULATED.3IONS-SCNC: 13 MMOL/L (ref 7–15)
BUN SERPL-MCNC: 15.6 MG/DL (ref 6–20)
CALCIUM SERPL-MCNC: 10.8 MG/DL (ref 8.8–10.4)
CHLORIDE SERPL-SCNC: 104 MMOL/L (ref 98–107)
CORTIS SERPL-MCNC: 17.1 UG/DL
CREAT SERPL-MCNC: 0.88 MG/DL (ref 0.51–0.95)
EGFRCR SERPLBLD CKD-EPI 2021: 89 ML/MIN/1.73M2
GLUCOSE SERPL-MCNC: 95 MG/DL (ref 70–99)
HCO3 SERPL-SCNC: 25 MMOL/L (ref 22–29)
HGB BLD-MCNC: 14.2 G/DL (ref 11.7–15.7)
POTASSIUM SERPL-SCNC: 4 MMOL/L (ref 3.4–5.3)
SODIUM SERPL-SCNC: 142 MMOL/L (ref 135–145)
TSH SERPL DL<=0.005 MIU/L-ACNC: 2.26 UIU/ML (ref 0.3–4.2)

## 2025-02-27 PROCEDURE — 84443 ASSAY THYROID STIM HORMONE: CPT | Mod: ORL | Performed by: PHYSICIAN ASSISTANT

## 2025-02-27 PROCEDURE — 80048 BASIC METABOLIC PNL TOTAL CA: CPT | Mod: ORL | Performed by: PHYSICIAN ASSISTANT

## 2025-02-27 PROCEDURE — 82533 TOTAL CORTISOL: CPT | Mod: ORL | Performed by: PHYSICIAN ASSISTANT

## 2025-02-27 PROCEDURE — 85018 HEMOGLOBIN: CPT | Mod: ORL | Performed by: PHYSICIAN ASSISTANT

## 2025-07-19 ENCOUNTER — HEALTH MAINTENANCE LETTER (OUTPATIENT)
Age: 35
End: 2025-07-19

## (undated) DEVICE — ENDO FORCEP BX CAPTURA PRO SPIKE G50696